# Patient Record
Sex: MALE | Race: WHITE | NOT HISPANIC OR LATINO | Employment: FULL TIME | URBAN - METROPOLITAN AREA
[De-identification: names, ages, dates, MRNs, and addresses within clinical notes are randomized per-mention and may not be internally consistent; named-entity substitution may affect disease eponyms.]

---

## 2023-03-10 LAB
ALANINE AMINOTRANSFERASE (SGPT) (U/L) IN SER/PLAS: 19 U/L (ref 10–52)
ALBUMIN (G/DL) IN SER/PLAS: 4.5 G/DL (ref 3.4–5)
ALBUMIN (MG/L) IN URINE: 29.6 MG/L
ALBUMIN/CREATININE (UG/MG) IN URINE: 31.3 UG/MG CRT (ref 0–30)
ALKALINE PHOSPHATASE (U/L) IN SER/PLAS: 37 U/L (ref 33–136)
ANION GAP IN SER/PLAS: 13 MMOL/L (ref 10–20)
ASPARTATE AMINOTRANSFERASE (SGOT) (U/L) IN SER/PLAS: 23 U/L (ref 9–39)
BILIRUBIN TOTAL (MG/DL) IN SER/PLAS: 0.5 MG/DL (ref 0–1.2)
CALCIUM (MG/DL) IN SER/PLAS: 10.6 MG/DL (ref 8.6–10.3)
CARBON DIOXIDE, TOTAL (MMOL/L) IN SER/PLAS: 23 MMOL/L (ref 21–32)
CHLORIDE (MMOL/L) IN SER/PLAS: 103 MMOL/L (ref 98–107)
CHOLESTEROL (MG/DL) IN SER/PLAS: 112 MG/DL (ref 0–199)
CHOLESTEROL IN HDL (MG/DL) IN SER/PLAS: 26.1 MG/DL
CHOLESTEROL/HDL RATIO: 4.3
CREATININE (MG/DL) IN SER/PLAS: 1.15 MG/DL (ref 0.5–1.3)
CREATININE (MG/DL) IN URINE: 94.5 MG/DL (ref 20–370)
GFR MALE: 70 ML/MIN/1.73M2
GLUCOSE (MG/DL) IN SER/PLAS: 104 MG/DL (ref 74–99)
LDL: 47 MG/DL (ref 0–99)
POTASSIUM (MMOL/L) IN SER/PLAS: 4.1 MMOL/L (ref 3.5–5.3)
PROTEIN TOTAL: 7 G/DL (ref 6.4–8.2)
SODIUM (MMOL/L) IN SER/PLAS: 135 MMOL/L (ref 136–145)
THYROTROPIN (MIU/L) IN SER/PLAS BY DETECTION LIMIT <= 0.05 MIU/L: 0.06 MIU/L (ref 0.44–3.98)
THYROXINE (T4) FREE (NG/DL) IN SER/PLAS: 1.63 NG/DL (ref 0.61–1.12)
TRIGLYCERIDE (MG/DL) IN SER/PLAS: 194 MG/DL (ref 0–149)
UREA NITROGEN (MG/DL) IN SER/PLAS: 20 MG/DL (ref 6–23)
VLDL: 39 MG/DL (ref 0–40)

## 2023-03-21 DIAGNOSIS — K21.9 GASTROESOPHAGEAL REFLUX DISEASE WITHOUT ESOPHAGITIS: Primary | ICD-10-CM

## 2023-03-21 RX ORDER — OMEPRAZOLE 40 MG/1
40 CAPSULE, DELAYED RELEASE ORAL DAILY
Qty: 90 CAPSULE | Refills: 2 | Status: SHIPPED | OUTPATIENT
Start: 2023-03-21 | End: 2023-12-07

## 2023-03-21 RX ORDER — OMEPRAZOLE 40 MG/1
1 CAPSULE, DELAYED RELEASE ORAL DAILY
COMMUNITY
Start: 2020-03-18 | End: 2023-03-21 | Stop reason: SDUPTHER

## 2023-04-24 DIAGNOSIS — I10 HTN (HYPERTENSION), BENIGN: Primary | ICD-10-CM

## 2023-04-24 DIAGNOSIS — F41.9 ANXIETY: ICD-10-CM

## 2023-04-24 RX ORDER — ATENOLOL 50 MG/1
TABLET ORAL
Qty: 90 TABLET | Refills: 1 | Status: SHIPPED | OUTPATIENT
Start: 2023-04-24 | End: 2023-12-07

## 2023-04-24 RX ORDER — FLUOXETINE 20 MG/1
20 TABLET ORAL DAILY
COMMUNITY
Start: 2022-09-15 | End: 2023-04-24 | Stop reason: SDUPTHER

## 2023-04-24 RX ORDER — FLUOXETINE 20 MG/1
20 TABLET ORAL DAILY
Qty: 90 TABLET | Refills: 1 | Status: SHIPPED | OUTPATIENT
Start: 2023-04-24 | End: 2023-10-31 | Stop reason: DRUGHIGH

## 2023-05-31 LAB — THYROTROPIN (MIU/L) IN SER/PLAS BY DETECTION LIMIT <= 0.05 MIU/L: 0.43 MIU/L (ref 0.44–3.98)

## 2023-10-31 ENCOUNTER — TELEPHONE (OUTPATIENT)
Dept: PRIMARY CARE | Facility: CLINIC | Age: 68
End: 2023-10-31

## 2023-10-31 ENCOUNTER — OFFICE VISIT (OUTPATIENT)
Dept: PRIMARY CARE | Facility: CLINIC | Age: 68
End: 2023-10-31
Payer: COMMERCIAL

## 2023-10-31 VITALS
RESPIRATION RATE: 17 BRPM | DIASTOLIC BLOOD PRESSURE: 72 MMHG | OXYGEN SATURATION: 98 % | SYSTOLIC BLOOD PRESSURE: 108 MMHG | WEIGHT: 240 LBS | BODY MASS INDEX: 36.49 KG/M2 | TEMPERATURE: 96.5 F | HEART RATE: 76 BPM

## 2023-10-31 DIAGNOSIS — I47.29 VENTRICULAR TACHYCARDIA (PAROXYSMAL) (MULTI): ICD-10-CM

## 2023-10-31 DIAGNOSIS — Z87.891 FORMER SMOKER: ICD-10-CM

## 2023-10-31 DIAGNOSIS — M54.50 CHRONIC BILATERAL LOW BACK PAIN WITHOUT SCIATICA: ICD-10-CM

## 2023-10-31 DIAGNOSIS — G89.29 CHRONIC BILATERAL LOW BACK PAIN WITHOUT SCIATICA: ICD-10-CM

## 2023-10-31 DIAGNOSIS — F41.9 ANXIETY: ICD-10-CM

## 2023-10-31 DIAGNOSIS — E11.59 TYPE 2 DIABETES MELLITUS WITH OTHER CIRCULATORY COMPLICATION, WITHOUT LONG-TERM CURRENT USE OF INSULIN (MULTI): ICD-10-CM

## 2023-10-31 DIAGNOSIS — F41.8 DEPRESSION WITH ANXIETY: Primary | ICD-10-CM

## 2023-10-31 PROBLEM — K21.9 GERD (GASTROESOPHAGEAL REFLUX DISEASE): Status: ACTIVE | Noted: 2023-10-31

## 2023-10-31 PROBLEM — I47.20 VENTRICULAR TACHYCARDIA (PAROXYSMAL): Status: ACTIVE | Noted: 2023-10-31

## 2023-10-31 PROBLEM — I25.5 ISCHEMIC CARDIOMYOPATHY: Status: ACTIVE | Noted: 2023-10-31

## 2023-10-31 PROBLEM — E11.9 DIABETES MELLITUS (MULTI): Status: ACTIVE | Noted: 2023-10-31

## 2023-10-31 PROBLEM — I25.10 CAD (CORONARY ARTERY DISEASE): Status: ACTIVE | Noted: 2023-10-31

## 2023-10-31 PROBLEM — R55 SYNCOPE: Status: RESOLVED | Noted: 2023-10-31 | Resolved: 2023-10-31

## 2023-10-31 PROBLEM — G47.33 OSA ON CPAP: Status: ACTIVE | Noted: 2023-10-31

## 2023-10-31 PROBLEM — E78.2 MIXED HYPERLIPIDEMIA: Status: ACTIVE | Noted: 2023-10-31

## 2023-10-31 PROBLEM — Z95.810 ICD (IMPLANTABLE CARDIOVERTER-DEFIBRILLATOR) IN PLACE: Status: ACTIVE | Noted: 2023-10-31

## 2023-10-31 PROBLEM — F17.200 CURRENT SMOKER: Status: RESOLVED | Noted: 2023-10-31 | Resolved: 2023-10-31

## 2023-10-31 PROBLEM — E55.9 VITAMIN D DEFICIENCY: Status: ACTIVE | Noted: 2023-10-31

## 2023-10-31 PROBLEM — E78.5 DYSLIPIDEMIA: Status: ACTIVE | Noted: 2023-10-31

## 2023-10-31 PROBLEM — E03.9 HYPOTHYROIDISM, ADULT: Status: ACTIVE | Noted: 2023-10-31

## 2023-10-31 PROBLEM — K57.32 DIVERTICULITIS, COLON: Status: RESOLVED | Noted: 2023-10-31 | Resolved: 2023-10-31

## 2023-10-31 PROBLEM — F17.200 CURRENT SMOKER: Status: ACTIVE | Noted: 2023-10-31

## 2023-10-31 PROBLEM — K76.0 FATTY LIVER: Status: ACTIVE | Noted: 2023-10-31

## 2023-10-31 PROBLEM — I10 HYPERTENSION: Status: RESOLVED | Noted: 2023-10-31 | Resolved: 2023-10-31

## 2023-10-31 PROBLEM — Z95.0 HISTORY OF CARDIAC PACEMAKER: Status: ACTIVE | Noted: 2023-10-31

## 2023-10-31 PROBLEM — N18.30 CHRONIC KIDNEY DISEASE, STAGE 3 (MULTI): Status: ACTIVE | Noted: 2023-10-31

## 2023-10-31 PROBLEM — H90.A32 MIXED CONDUCTIVE AND SENSORINEURAL HEARING LOSS OF LEFT EAR WITH RESTRICTED HEARING OF RIGHT EAR: Status: ACTIVE | Noted: 2023-10-31

## 2023-10-31 PROBLEM — Z98.61 S/P PTCA (PERCUTANEOUS TRANSLUMINAL CORONARY ANGIOPLASTY): Status: ACTIVE | Noted: 2023-10-31

## 2023-10-31 PROBLEM — N52.9 MALE ERECTILE DISORDER OF ORGANIC ORIGIN: Status: ACTIVE | Noted: 2023-10-31

## 2023-10-31 PROBLEM — N40.0 BPH (BENIGN PROSTATIC HYPERPLASIA): Status: ACTIVE | Noted: 2023-10-31

## 2023-10-31 PROCEDURE — 1159F MED LIST DOCD IN RCRD: CPT

## 2023-10-31 PROCEDURE — 3074F SYST BP LT 130 MM HG: CPT

## 2023-10-31 PROCEDURE — 1160F RVW MEDS BY RX/DR IN RCRD: CPT

## 2023-10-31 PROCEDURE — 1126F AMNT PAIN NOTED NONE PRSNT: CPT

## 2023-10-31 PROCEDURE — 3078F DIAST BP <80 MM HG: CPT

## 2023-10-31 PROCEDURE — 4010F ACE/ARB THERAPY RXD/TAKEN: CPT

## 2023-10-31 PROCEDURE — 99214 OFFICE O/P EST MOD 30 MIN: CPT

## 2023-10-31 RX ORDER — ERGOCALCIFEROL 1.25 MG/1
1 CAPSULE ORAL WEEKLY
COMMUNITY
Start: 2020-03-02 | End: 2023-12-08 | Stop reason: SDUPTHER

## 2023-10-31 RX ORDER — BACLOFEN 10 MG/1
10 TABLET ORAL 2 TIMES DAILY
Qty: 60 TABLET | Refills: 5 | Status: SHIPPED | OUTPATIENT
Start: 2023-10-31 | End: 2023-10-31 | Stop reason: SDUPTHER

## 2023-10-31 RX ORDER — BACLOFEN 10 MG/1
10 TABLET ORAL 2 TIMES DAILY
Qty: 60 TABLET | Refills: 5 | Status: SHIPPED | OUTPATIENT
Start: 2023-10-31 | End: 2023-12-15 | Stop reason: SDUPTHER

## 2023-10-31 RX ORDER — ICOSAPENT ETHYL 1 G/1
2 CAPSULE ORAL 2 TIMES DAILY
COMMUNITY
Start: 2020-03-02

## 2023-10-31 RX ORDER — LANOLIN ALCOHOL/MO/W.PET/CERES
1 CREAM (GRAM) TOPICAL DAILY
COMMUNITY

## 2023-10-31 RX ORDER — BLOOD-GLUCOSE METER
KIT MISCELLANEOUS
COMMUNITY
Start: 2020-04-09

## 2023-10-31 RX ORDER — DULAGLUTIDE 3 MG/.5ML
INJECTION, SOLUTION SUBCUTANEOUS
COMMUNITY
Start: 2020-03-02

## 2023-10-31 RX ORDER — LOSARTAN POTASSIUM 50 MG/1
1 TABLET ORAL 2 TIMES DAILY
COMMUNITY
Start: 2018-07-17 | End: 2024-05-01 | Stop reason: SDUPTHER

## 2023-10-31 RX ORDER — MELOXICAM 15 MG/1
1 TABLET ORAL DAILY PRN
COMMUNITY
Start: 2022-10-12 | End: 2023-12-15 | Stop reason: SDUPTHER

## 2023-10-31 RX ORDER — CLOPIDOGREL BISULFATE 75 MG/1
1 TABLET ORAL DAILY
COMMUNITY
Start: 2020-09-11 | End: 2024-05-21 | Stop reason: SDUPTHER

## 2023-10-31 RX ORDER — HYDROCORTISONE AND ACETIC ACID 20.75; 10.375 MG/ML; MG/ML
SOLUTION AURICULAR (OTIC)
COMMUNITY
Start: 2021-05-18 | End: 2023-11-15 | Stop reason: ALTCHOICE

## 2023-10-31 RX ORDER — LEVOTHYROXINE SODIUM 175 UG/1
175 TABLET ORAL DAILY
COMMUNITY
Start: 2020-03-02 | End: 2023-12-07

## 2023-10-31 RX ORDER — TADALAFIL 5 MG/1
1 TABLET ORAL DAILY
COMMUNITY
Start: 2022-04-27 | End: 2024-05-29 | Stop reason: SDUPTHER

## 2023-10-31 RX ORDER — ASCORBIC ACID 500 MG
TABLET,CHEWABLE ORAL
COMMUNITY
Start: 2021-09-29 | End: 2024-01-22 | Stop reason: ALTCHOICE

## 2023-10-31 RX ORDER — ROSUVASTATIN CALCIUM 40 MG/1
1 TABLET, COATED ORAL DAILY
COMMUNITY
Start: 2021-10-20 | End: 2023-12-07

## 2023-10-31 RX ORDER — EMPAGLIFLOZIN AND METFORMIN HYDROCHLORIDE 12.5; 1 MG/1; MG/1
2 TABLET ORAL DAILY
COMMUNITY
Start: 2020-03-02 | End: 2023-12-07

## 2023-10-31 RX ORDER — PIOGLITAZONEHYDROCHLORIDE 45 MG/1
1 TABLET ORAL DAILY
COMMUNITY
Start: 2020-04-17 | End: 2023-12-07

## 2023-10-31 RX ORDER — ASPIRIN 81 MG/1
1 TABLET ORAL DAILY
COMMUNITY

## 2023-10-31 RX ORDER — FENOFIBRATE 145 MG/1
1 TABLET, FILM COATED ORAL DAILY
COMMUNITY
Start: 2014-09-12 | End: 2023-12-08

## 2023-10-31 RX ORDER — FLUTICASONE PROPIONATE 50 MCG
1 SPRAY, SUSPENSION (ML) NASAL DAILY
COMMUNITY
Start: 2020-03-02

## 2023-10-31 RX ORDER — FLUOXETINE 20 MG/1
20 TABLET ORAL DAILY
Qty: 90 TABLET | Refills: 1 | Status: SHIPPED | OUTPATIENT
Start: 2023-10-31

## 2023-10-31 RX ORDER — NITROGLYCERIN 0.4 MG/1
0.4 TABLET SUBLINGUAL EVERY 5 MIN PRN
COMMUNITY

## 2023-10-31 ASSESSMENT — PATIENT HEALTH QUESTIONNAIRE - PHQ9
SUM OF ALL RESPONSES TO PHQ9 QUESTIONS 1 AND 2: 4
3. TROUBLE FALLING OR STAYING ASLEEP OR SLEEPING TOO MUCH: NOT AT ALL
6. FEELING BAD ABOUT YOURSELF - OR THAT YOU ARE A FAILURE OR HAVE LET YOURSELF OR YOUR FAMILY DOWN: NOT AT ALL
SUM OF ALL RESPONSES TO PHQ QUESTIONS 1-9: 11
5. POOR APPETITE OR OVEREATING: NEARLY EVERY DAY
1. LITTLE INTEREST OR PLEASURE IN DOING THINGS: NEARLY EVERY DAY
9. THOUGHTS THAT YOU WOULD BE BETTER OFF DEAD, OR OF HURTING YOURSELF: NOT AT ALL
7. TROUBLE CONCENTRATING ON THINGS, SUCH AS READING THE NEWSPAPER OR WATCHING TELEVISION: SEVERAL DAYS
2. FEELING DOWN, DEPRESSED OR HOPELESS: SEVERAL DAYS
8. MOVING OR SPEAKING SO SLOWLY THAT OTHER PEOPLE COULD HAVE NOTICED. OR THE OPPOSITE, BEING SO FIGETY OR RESTLESS THAT YOU HAVE BEEN MOVING AROUND A LOT MORE THAN USUAL: NOT AT ALL
4. FEELING TIRED OR HAVING LITTLE ENERGY: NEARLY EVERY DAY

## 2023-10-31 NOTE — PATIENT INSTRUCTIONS
It was nice seeing you in the office today.  Sign up for MyChart to get results instantly.  Obtain labs/imaging as discussed during our visit.   Follow up 2-3 weeks for OMT.   Call the office: [phone] for questions or concerns.  Please allow 48-72 hours for medication refills.    Alek Clemens D.O.   Family Medicine PGY-1, [facility]  10/31/23

## 2023-10-31 NOTE — PROGRESS NOTES
Subjective   Silvio Noel is a 68 y.o. male who presents for Back Pain.  Back began to hurt 8/2/2022. No associated injury. Saw Dr. Jernigan initially, recommended PT but opted to not do so.     The pain radiates from the lumbar spine to the knee on the L side but not the R side. Has been taking meloxicam and tramadol with some relief. Feels much weaker now as well, lays on the couch all day which began before the back pain and has gotten progressively worse.     PHQ 9 score 11, has been on fluoxetine for many years. Previously has taken 40mg, currently on 20mg.     Quit smoking 8/2023.                 Objective     /72 (BP Location: Left arm, Patient Position: Sitting, BP Cuff Size: Large adult)   Pulse 76   Temp 35.8 °C (96.5 °F)   Resp 17   Wt 109 kg (240 lb)   SpO2 98%   BMI 36.49 kg/m²   Physical Exam  Vitals reviewed.   Constitutional:       Appearance: Normal appearance.   Eyes:      Extraocular Movements: Extraocular movements intact.   Cardiovascular:      Rate and Rhythm: Normal rate and regular rhythm.      Pulses: Normal pulses.      Heart sounds: Normal heart sounds.   Pulmonary:      Effort: Pulmonary effort is normal.      Breath sounds: Normal breath sounds.   Musculoskeletal:         General: No tenderness or deformity.      Right lower leg: No edema.      Left lower leg: No edema.      Comments: B/l UE strength 5/5, no tenderness. B/l LE strength 5/5 grossly, no muscle tenderness. Straight leg raise test on the L is positive.    Skin:     General: Skin is warm and dry.   Neurological:      General: No focal deficit present.      Mental Status: He is alert and oriented to person, place, and time.   Psychiatric:         Mood and Affect: Mood normal.         Behavior: Behavior normal.         Assessment/Plan   Problem List Items Addressed This Visit       Depression with anxiety - Primary     PHQ-9 10/31/23 is 11. Increased prozac to 40mg. Patient has tolerated 40mg in the past.           Low back pain     XR lumbar 8/2022 showing mild degenerative changes.     Referral to PT 10/31/2023.     Offered OMM in 2-3 weeks.          Relevant Medications    baclofen (Lioresal) 10 mg tablet    Other Relevant Orders    Referral to Physical Therapy    Former smoker     Other Visit Diagnoses       Anxiety        Relevant Medications    FLUoxetine (PROzac) 20 mg tablet                   Alek Clemens D.O.   Family Medicine PGY-1, [facility]  10/31/23

## 2023-10-31 NOTE — ASSESSMENT & PLAN NOTE
XR lumbar 8/2022 showing mild degenerative changes.     Referral to PT 10/31/2023.     Offered OMM in 2-3 weeks.

## 2023-11-01 NOTE — PROGRESS NOTES
I saw and evaluated the patient. I personally obtained the key and critical portions of the history and physical exam or was physically present for key and critical portions performed by the resident/fellow. I reviewed the resident/fellow's documentation and discussed the patient with the resident/fellow. I agree with the resident/fellow's medical decision making as documented in the note.    Jose Jernigan, DO

## 2023-11-06 ENCOUNTER — APPOINTMENT (OUTPATIENT)
Dept: CARDIOLOGY | Facility: CLINIC | Age: 68
End: 2023-11-06
Payer: COMMERCIAL

## 2023-11-15 ENCOUNTER — OFFICE VISIT (OUTPATIENT)
Dept: ENDOCRINOLOGY | Facility: CLINIC | Age: 68
End: 2023-11-15
Payer: COMMERCIAL

## 2023-11-15 VITALS
HEIGHT: 68 IN | SYSTOLIC BLOOD PRESSURE: 132 MMHG | BODY MASS INDEX: 37.28 KG/M2 | DIASTOLIC BLOOD PRESSURE: 76 MMHG | WEIGHT: 246 LBS

## 2023-11-15 DIAGNOSIS — E78.5 DYSLIPIDEMIA: ICD-10-CM

## 2023-11-15 DIAGNOSIS — E03.9 HYPOTHYROIDISM, ADULT: ICD-10-CM

## 2023-11-15 DIAGNOSIS — E11.59 TYPE 2 DIABETES MELLITUS WITH OTHER CIRCULATORY COMPLICATION, WITHOUT LONG-TERM CURRENT USE OF INSULIN (MULTI): Primary | ICD-10-CM

## 2023-11-15 LAB
POC FINGERSTICK BLOOD GLUCOSE: 171 MG/DL (ref 70–100)
POC HEMOGLOBIN A1C: 7.5 % (ref 4.2–6.5)

## 2023-11-15 PROCEDURE — 4010F ACE/ARB THERAPY RXD/TAKEN: CPT | Performed by: HOSPITALIST

## 2023-11-15 PROCEDURE — 82962 GLUCOSE BLOOD TEST: CPT | Performed by: HOSPITALIST

## 2023-11-15 PROCEDURE — 1159F MED LIST DOCD IN RCRD: CPT | Performed by: HOSPITALIST

## 2023-11-15 PROCEDURE — 3078F DIAST BP <80 MM HG: CPT | Performed by: HOSPITALIST

## 2023-11-15 PROCEDURE — 1126F AMNT PAIN NOTED NONE PRSNT: CPT | Performed by: HOSPITALIST

## 2023-11-15 PROCEDURE — 99214 OFFICE O/P EST MOD 30 MIN: CPT | Performed by: HOSPITALIST

## 2023-11-15 PROCEDURE — 3075F SYST BP GE 130 - 139MM HG: CPT | Performed by: HOSPITALIST

## 2023-11-15 PROCEDURE — 83036 HEMOGLOBIN GLYCOSYLATED A1C: CPT | Performed by: HOSPITALIST

## 2023-11-15 PROCEDURE — 1160F RVW MEDS BY RX/DR IN RCRD: CPT | Performed by: HOSPITALIST

## 2023-11-15 RX ORDER — INSULIN GLARGINE 100 [IU]/ML
28 INJECTION, SOLUTION SUBCUTANEOUS NIGHTLY
COMMUNITY
End: 2023-12-15 | Stop reason: SDUPTHER

## 2023-11-15 NOTE — PROGRESS NOTES
"Subjective   Patient ID: Silvio Noel is a 68 y.o. male who presents for Diabetes (Dx DM: 2013/PCP: [facility]/Podiatry: does not see one /Eye exam: yearly, overdue per patient /Patient testing glucose once daily. Did not bring meter./Last hga1c 6/1/23 result 6.6%) and Hypothyroidism (175- 1 tab mon- fri 1/2 tab sat and none on sun /Takes correctly most of the time ).  Lab Results   Component Value Date    HGBA1C 7.5 (A) 11/15/2023      HPI    Review of Systems    Objective   Visit Vitals  /76   Ht 1.727 m (5' 8\")   Wt 112 kg (246 lb)   BMI 37.40 kg/m²   Smoking Status Former   BSA 2.32 m²      Physical Exam    Assessment/Plan   Diagnoses and all orders for this visit:  Type 2 diabetes mellitus with other circulatory complication, without long-term current use of insulin (CMS/[facility])  -     POCT glucose manually resulted  -     POCT glycosylated hemoglobin (Hb A1C) manually resulted  Hypothyroidism, adult  Dyslipidemia      Pt is 68 year old man with type II DM since 2013.    Current regimen :   Synjardy 12.5/1000mg 2 tabs daily  Trulicity 3 mg weekly  pioglitazone 45mg daily,    Restarted taking basaglar 0-0-0-28 ( started ~ 2 weeks ago )     he is complaint with trulicity , no SE    he stopped BASAGLAR after taking trulicity as prescribed. But restarted it 2-3 weeks ago  Checking BG once morning -119,120s ,    Did not take basaglar last night and morning Bg was 135   denies any hypoglycemia symptoms   h/o increased urination and hesitancy       stopped regular soda in past. RESTARTED IT, drinks 1 can every other day. Gained ~ 16 lbs    HgA1C worse and above goal   - insurance changing to medicare soon, he is unsure about coverage of his meds.     CONTINUE SYNJARDY/ TRULICITY / PIOGLITAZONE   CHANGE BASAGLAR TO 20 UNITS AT NIGHT    Stop POP      IF SYNJARDY AND TRULICITY NOT COVERED THEN WE CHANGE TO METFORMIN 1000MG TWICE A DAY WITH GOOD AND GLIPIZIDE 10 MG TWICE A DAY BEFORE EATING. NO GLPIZIDE IF NOT " EATING , IT CAN CAUSE LOW BLOOD SUGAR OTHERWISE.   - no change in regimen  today  - in past discussed that alcohol metformin and trulicity combination risks of pancreatitis as well. he is aware of SE  - advised to check BG 3 times a day and bring glucometer next visit   - if plan for GC injection advised to call me   -BP at goal, on ARB  -creatinine normal   on statin. LDL at goal  -eye exam due   -negative microalbumin  -Pt instructed to call office with any hypoglycemia, hyperglycemia, or any other concerns.    Vitamin D deficiency:   Pt is currently taking vitamin D 50,000 IU weekly.    Hypothyroidism:TSH was 0.43 in 5/ 2023   clinically euthyroid   he has a lot of 175 at home , take 175- 1 tab mon- fri 1/2 tab sat and none on sun    once he is done with 175 mcg tabs , will change to 137 mcg 1 tab daily   labs before NV     RTC 6m     SH- works with [email or web address], works from home , has 3 kid and 3 step kids, 15 GKs ,  TWIN GK CAME IN  AUG 2023 .

## 2023-11-15 NOTE — PATIENT INSTRUCTIONS
CONTINUE SYNJARDY/ TRULICITY / PIOGLITAZONE   CHANGE BASAGLAR TO 20 UNITS AT NIGHT    Stop POP      IF SYNJARDY AND TRULICITY NOT COVERED THEN WE CHANGE TO METFORMIN 1000MG TWICE A DAY WITH GOOD AND GLIPIZIDE 10 MG TWICE A DAY BEFORE EATING. NO GLPIZIDE IF NOT EATING , IT CAN CAUSE LOW BLOOD SUGAR OTHERWISE.

## 2023-11-20 ENCOUNTER — OFFICE VISIT (OUTPATIENT)
Dept: CARDIOLOGY | Facility: CLINIC | Age: 68
End: 2023-11-20
Payer: COMMERCIAL

## 2023-11-20 VITALS
HEIGHT: 68 IN | BODY MASS INDEX: 37.28 KG/M2 | HEART RATE: 63 BPM | WEIGHT: 246 LBS | DIASTOLIC BLOOD PRESSURE: 86 MMHG | SYSTOLIC BLOOD PRESSURE: 142 MMHG

## 2023-11-20 DIAGNOSIS — I25.5 ISCHEMIC CARDIOMYOPATHY: ICD-10-CM

## 2023-11-20 DIAGNOSIS — E78.5 DYSLIPIDEMIA: ICD-10-CM

## 2023-11-20 DIAGNOSIS — E78.2 MIXED HYPERLIPIDEMIA: ICD-10-CM

## 2023-11-20 DIAGNOSIS — Z98.61 S/P PTCA (PERCUTANEOUS TRANSLUMINAL CORONARY ANGIOPLASTY): ICD-10-CM

## 2023-11-20 DIAGNOSIS — I25.10 CORONARY ARTERY DISEASE INVOLVING NATIVE HEART, UNSPECIFIED VESSEL OR LESION TYPE, UNSPECIFIED WHETHER ANGINA PRESENT: ICD-10-CM

## 2023-11-20 PROCEDURE — 99213 OFFICE O/P EST LOW 20 MIN: CPT | Performed by: INTERNAL MEDICINE

## 2023-11-20 PROCEDURE — 1126F AMNT PAIN NOTED NONE PRSNT: CPT | Performed by: INTERNAL MEDICINE

## 2023-11-20 PROCEDURE — 3077F SYST BP >= 140 MM HG: CPT | Performed by: INTERNAL MEDICINE

## 2023-11-20 PROCEDURE — 4010F ACE/ARB THERAPY RXD/TAKEN: CPT | Performed by: INTERNAL MEDICINE

## 2023-11-20 PROCEDURE — 1159F MED LIST DOCD IN RCRD: CPT | Performed by: INTERNAL MEDICINE

## 2023-11-20 PROCEDURE — 1160F RVW MEDS BY RX/DR IN RCRD: CPT | Performed by: INTERNAL MEDICINE

## 2023-11-20 PROCEDURE — 3079F DIAST BP 80-89 MM HG: CPT | Performed by: INTERNAL MEDICINE

## 2023-11-20 ASSESSMENT — ENCOUNTER SYMPTOMS
RESPIRATORY NEGATIVE: 1
CONSTITUTIONAL NEGATIVE: 1
CARDIOVASCULAR NEGATIVE: 1
NEUROLOGICAL NEGATIVE: 1

## 2023-11-20 NOTE — PROGRESS NOTES
Referred by Dr. Guzman ref. provider found provider found for   Chief Complaint   Patient presents with    Follow-up     6 month follow up.         History of Present Illness  Silvio Noel is a 68 y.o. year old male patient with history of hypertension.  Doing well from a cardiac standpoint no complaint no symptoms of chest pain or shortness of breath denies any symptoms of palpitations syncope or presyncope.  I discussed with the patient at length we will continue medication will call for any problems and follow-up as scheduled    Past Medical History  Past Medical History:   Diagnosis Date    Atherosclerotic heart disease of native coronary artery without angina pectoris 2019    Coronary artery disease without angina pectoris, unspecified vessel or lesion type, unspecified whether native or transplanted heart    Diverticulitis, colon 10/31/2023       Social History  Social History     Tobacco Use    Smoking status: Former     Types: Cigarettes     Quit date: 2023     Years since quittin.3    Smokeless tobacco: Not on file   Vaping Use    Vaping Use: Never used   Substance Use Topics    Alcohol use: Not on file    Drug use: Not on file       Family History   No family history on file.    Review of Systems  As per HPI, all other systems reviewed and negative.    Allergies:  Allergies   Allergen Reactions    Codeine Unknown     Blood pressure goes down    Hydrocodone-Acetaminophen Unknown     Blood pressure goes up    Lisinopril Cough    Losartan Unknown    Simvastatin Unknown        Outpatient Medications:  Current Outpatient Medications   Medication Instructions    ascorbic acid (Strawberry C) 500 mg chewable tablet oral    aspirin 81 mg EC tablet 1 tablet, oral, Daily    atenolol (Tenormin) 50 mg tablet TAKE 1/2 TABLET TWICE A DAY    baclofen (LIORESAL) 10 mg, oral, 2 times daily    clopidogrel (Plavix) 75 mg tablet 1 tablet, oral, Daily    ergocalciferol (Vitamin D-2) 1.25 MG (09167 UT) capsule 1  capsule, oral, Weekly    fenofibrate (Tricor) 145 mg tablet 1 tablet, oral, Daily    FLUoxetine (PROZAC) 20 mg, oral, Daily    fluticasone (Flonase) 50 mcg/actuation nasal spray 1 spray, nasal, Daily    FreeStyle Lite Strips strip USE TWICE DAILY    icosapent ethyL (Vascepa) 1 gram capsule 2 capsules, oral, 2 times daily    insulin glargine (BASAGLAR KWIKPEN U-100 INSULIN) 28 Units, subcutaneous, Nightly, Take as directed per insulin instructions.    levothyroxine (SYNTHROID, LEVOXYL) 175 mcg, oral, Daily, HALF tablet Saturday and NO tablet on Sunday     losartan (Cozaar) 50 mg tablet 1 tablet, oral, 2 times daily    magnesium oxide (Mag-Ox) 400 mg (241.3 mg magnesium) tablet 1 tablet, oral, Daily    meloxicam (Mobic) 15 mg tablet 1 tablet, oral, Daily PRN    nitroglycerin (NITROSTAT) 0.4 mg, sublingual, Every 5 min PRN    omeprazole (PRILOSEC) 40 mg, oral, Daily    pioglitazone (Actos) 45 mg tablet 1 tablet, oral, Daily    rosuvastatin (Crestor) 40 mg tablet 1 tablet, oral, Daily    Synjardy 12.5-1,000 mg 2 tablets, oral, Daily    tadalafil (Cialis) 5 mg tablet 1 tablet, oral, Daily    Trulicity 3 mg/0.5 mL pen injector subcutaneous         Vitals:  Vitals:    11/20/23 1530   BP: (!) 149/95   Pulse: 63       Physical Exam:  Physical Exam  Constitutional:       Appearance: He is obese.   Neck:      Vascular: No carotid bruit.   Cardiovascular:      Rate and Rhythm: Normal rate and regular rhythm.      Pulses: Normal pulses.      Heart sounds: No murmur heard.  Pulmonary:      Effort: Pulmonary effort is normal.      Breath sounds: Normal breath sounds.   Skin:     General: Skin is warm and dry.   Neurological:      General: No focal deficit present.      Mental Status: He is alert and oriented to person, place, and time.         Review of Systems   Constitutional: Negative.    Respiratory: Negative.     Cardiovascular: Negative.    Neurological: Negative.         Assessment/Plan   Problem List Items Addressed This  Visit             ICD-10-CM    CAD (coronary artery disease) I25.10    Dyslipidemia E78.5    Ischemic cardiomyopathy I25.5    Mixed hyperlipidemia E78.2    S/P PTCA (percutaneous transluminal coronary angioplasty) Z98.61       Scribe Attestation  By signing my name below, I Annelise WILFRED Jacques LPN  , Scribe   attest that this documentation has been prepared under the direction and in the presence of Savita Newman MD.     Savita Newman MD [facility]  Interventional Cardiology   of [facility]     Thank you for allowing me to participate in the care of this patient. Please do not hesitate to contact me with any further questions or concerns.

## 2023-11-21 ENCOUNTER — OFFICE VISIT (OUTPATIENT)
Dept: CARDIOLOGY | Facility: CLINIC | Age: 68
End: 2023-11-21
Payer: COMMERCIAL

## 2023-11-21 ENCOUNTER — ANCILLARY PROCEDURE (OUTPATIENT)
Dept: CARDIOLOGY | Facility: CLINIC | Age: 68
End: 2023-11-21
Payer: COMMERCIAL

## 2023-11-21 VITALS
BODY MASS INDEX: 37.13 KG/M2 | DIASTOLIC BLOOD PRESSURE: 78 MMHG | WEIGHT: 245 LBS | TEMPERATURE: 97.5 F | HEIGHT: 68 IN | HEART RATE: 82 BPM | SYSTOLIC BLOOD PRESSURE: 126 MMHG

## 2023-11-21 DIAGNOSIS — I47.29 PAROXYSMAL VENTRICULAR TACHYCARDIA (MULTI): ICD-10-CM

## 2023-11-21 DIAGNOSIS — Z95.810 PRESENCE OF AUTOMATIC CARDIOVERTER/DEFIBRILLATOR (AICD): ICD-10-CM

## 2023-11-21 DIAGNOSIS — Z95.810 ICD (IMPLANTABLE CARDIOVERTER-DEFIBRILLATOR) IN PLACE: Primary | ICD-10-CM

## 2023-11-21 PROCEDURE — 3078F DIAST BP <80 MM HG: CPT | Performed by: INTERNAL MEDICINE

## 2023-11-21 PROCEDURE — 99213 OFFICE O/P EST LOW 20 MIN: CPT | Performed by: INTERNAL MEDICINE

## 2023-11-21 PROCEDURE — 4010F ACE/ARB THERAPY RXD/TAKEN: CPT | Performed by: INTERNAL MEDICINE

## 2023-11-21 PROCEDURE — 93283 PRGRMG EVAL IMPLANTABLE DFB: CPT | Performed by: INTERNAL MEDICINE

## 2023-11-21 PROCEDURE — 1126F AMNT PAIN NOTED NONE PRSNT: CPT | Performed by: INTERNAL MEDICINE

## 2023-11-21 PROCEDURE — 3074F SYST BP LT 130 MM HG: CPT | Performed by: INTERNAL MEDICINE

## 2023-11-21 PROCEDURE — 93290 INTERROG DEV EVAL ICPMS IP: CPT | Performed by: INTERNAL MEDICINE

## 2023-11-21 PROCEDURE — 1159F MED LIST DOCD IN RCRD: CPT | Performed by: INTERNAL MEDICINE

## 2023-11-21 PROCEDURE — 1160F RVW MEDS BY RX/DR IN RCRD: CPT | Performed by: INTERNAL MEDICINE

## 2023-11-21 ASSESSMENT — PATIENT HEALTH QUESTIONNAIRE - PHQ9
2. FEELING DOWN, DEPRESSED OR HOPELESS: NOT AT ALL
SUM OF ALL RESPONSES TO PHQ9 QUESTIONS 1 AND 2: 0
1. LITTLE INTEREST OR PLEASURE IN DOING THINGS: NOT AT ALL

## 2023-11-21 NOTE — PROGRESS NOTES
Subjective:  The patient is a 68-year-old white male, followed primarily by Dr. Joes Jernigan and from a cardiology standpoint by Dr. Savita Newman, who presents again for ICD follow-up.  He suffered an inferoposterior myocardial infarction at age 45 with a cardiac arrest from which she was resuscitated.  He has had multiple percutaneous interventions but has never required CABG.  His LVEF has ranged between 40% and 55%.    The patient underwent EP testing by Dr. Eron Ardon in September 2004 and had inducible ventricular tachycardia.  A [employer] DDDR ICD was placed at that time.  The patient had some early shocks due to atrial tachycardia.  His generator was replaced in May 2011 by Dr. Nahomy Wade.  He was lost to follow-up for several years but reestablished with us in 2019.  He is still not yet at the elective replacement indicator of this device.    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  He enjoys playing golf, and played 3 times a week this year, but typically shoots 45-55 for 9 holes.  His best 9 of the year was a 43.    Current Outpatient Medications   Medication Sig    ascorbic acid (Strawberry C) 500 mg chewable tablet Chew.    aspirin 81 mg EC tablet Take 1 tablet (81 mg) by mouth once daily.    atenolol (Tenormin) 50 mg tablet TAKE 1 TABLET TWICE A DAY.    baclofen (Lioresal) 10 mg tablet Take 1 tablet (10 mg) by mouth 2 times a day.    clopidogrel (Plavix) 75 mg tablet Take 1 tablet (75 mg) by mouth once daily.    ergocalciferol (Vitamin D-2) 1.25 MG (21599 UT) capsule Take 1 capsule (1,250 mcg) by mouth once a week.    fenofibrate (Tricor) 145 mg tablet Take 1 tablet (145 mg) by mouth once daily.    FLUoxetine (PROzac) 20 mg tablet Take 1 tablet (20 mg) by mouth once daily.    fluticasone (Flonase) 50 mcg/actuation nasal spray Administer 1 spray into affected nostril(s) once daily.    FreeStyle Lite Strips strip USE TWICE DAILY     icosapent ethyL (Vascepa) 1 gram capsule Take 2 capsules (2 g) by mouth 2 times a day.    insulin glargine (Basaglar KwikPen U-100 Insulin) 100 unit/mL (3 mL) pen Inject 28 Units under the skin once daily at bedtime. Take as directed per insulin instructions.    levothyroxine (Synthroid, Levoxyl) 175 mcg tablet Take 1 tablet (175 mcg) by mouth once daily. HALF tablet Saturday and NO tablet on Sunday    losartan (Cozaar) 50 mg tablet Take 1 tablet (50 mg) by mouth 2 times a day.    magnesium oxide (Mag-Ox) 400 mg (241.3 mg magnesium) tablet Take 1 tablet (400 mg) by mouth once daily.    meloxicam (Mobic) 15 mg tablet Take 1 tablet (15 mg) by mouth once daily as needed.    nitroglycerin (Nitrostat) 0.4 mg SL tablet Place 1 tablet (0.4 mg) under the tongue every 5 minutes if needed.    omeprazole (PriLOSEC) 40 mg DR capsule Take 1 capsule (40 mg) by mouth once daily.    pioglitazone (Actos) 45 mg tablet Take 1 tablet (45 mg) by mouth once daily.    rosuvastatin (Crestor) 40 mg tablet Take 1 tablet (40 mg) by mouth once daily.    Synjardy 12.5-1,000 mg Take 2 tablets by mouth once daily.    tadalafil (Cialis) 5 mg tablet Take 1 tablet (5 mg) by mouth once daily.    Trulicity 3 mg/0.5 mL pen injector Inject under the skin.     Allergies:  Codeine, Hydrocodone-acetaminophen, Lisinopril, Losartan, and Simvastatin     Patient Active Problem List   Diagnosis    BPH (benign prostatic hyperplasia)    CAD (coronary artery disease)    Chronic kidney disease, stage 3 (CMS/HCC)    Depression with anxiety    Type 2 diabetes mellitus with other circulatory complication, without long-term current use of insulin (CMS/HCC)    Dyslipidemia    Fatty liver    GERD (gastroesophageal reflux disease)    History of cardiac pacemaker    Hypothyroidism, adult    ICD (implantable cardioverter-defibrillator) in place    Ischemic cardiomyopathy    Low back pain    Male erectile disorder of organic origin    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    Mixed hyperlipidemia    MARY on CPAP    S/P PTCA (percutaneous transluminal coronary angioplasty)    Vitamin D deficiency    Ventricular tachycardia (paroxysmal) (CMS/HCC)    Former smoker     Past Surgical History:   Procedure Laterality Date    OTHER SURGICAL HISTORY  06/30/2015    Cardioverter-defibrillator Pulse Generator Insertion    OTHER SURGICAL HISTORY  09/23/2021    Colonoscopy    OTHER SURGICAL HISTORY  09/23/2021    Percutaneous transluminal coronary angioplasty    OTHER SURGICAL HISTORY  09/23/2021    Cardiac catheterization    TONSILLECTOMY  06/30/2015    Tonsillectomy     Objective:  Vitals:    11/21/23 1536   BP: 126/78   Pulse: 82   Temp: 36.4 °C (97.5 °F)   Height:     1.727 m (5' 8\")  Weight: 111 kg (245 lb)     Exam:  Gen: Pleasant bearded gentleman in no distress.  HEENT: No scleral icterus.  Neck: No jugular venous distention or thyromegaly.  Left subclavian ICD pocket: Normal in appearance.  Lungs: Clear to auscultation, with no wheezes or rales.  Heart: Regular rhythm with grade 1/6 functional flow murmur along left sternal border with normal S2.  Abdomen: Benign, with no organomegaly or masses.  Extremities: Intact distal pulses; no edema.  Neuro: No focal neurologic abnormalities.  Skin: No cutaneous lesions.    Device Check:  A [employer] ICD check was done.  The unit is programmed for backup DDI pacing at 60 bpm, provide only 1% atrial pacing and 0% ventricular pacing.  The lead parameters were excellent.  The device battery longevity is still estimated at 9 more months.    Impressions:  1.  Coronary artery disease, status post inferoposterior myocardial infarction 23 years ago.  The patient is known to have an occluded circumflex, and underwent RCA stenting in August 2020.  He is followed by Dr. Newman.  2.  Mild ischemic cardiomyopathy (LVEF 40-55%), without heart failure symptoms.  3.  Status postcardiac arrest 23 " years ago, and inducible VT by EP testing in 2004.  4.  Status post [employer] DDDR ICD systems in September 2004 and May 2011.  Surprisingly, the current device may last until 2024.  5.  Other medical problems, including hypertension, type 2 diabetes, hyperlipidemia, hypothyroidism, chronic obesity, and tobacco use, followed by Dr. Jernigan.    Recommendations:  1.  The patient will follow-up with me in 6 to 8 months for another ICD check.  2.  It is anticipated that he will require ICD generator replacement in 2024.  3.  With any sustained supraventricular or ventricular arrhythmias, the patient's atenolol may be switched to empiric sotalol.      Stanislaw Sanchez MD

## 2023-12-07 DIAGNOSIS — E03.9 HYPOTHYROIDISM, ADULT: ICD-10-CM

## 2023-12-07 DIAGNOSIS — K21.9 GASTROESOPHAGEAL REFLUX DISEASE WITHOUT ESOPHAGITIS: ICD-10-CM

## 2023-12-07 DIAGNOSIS — E78.41 ELEVATED LIPOPROTEIN(A): Primary | ICD-10-CM

## 2023-12-07 DIAGNOSIS — I10 HTN (HYPERTENSION), BENIGN: ICD-10-CM

## 2023-12-07 DIAGNOSIS — E11.59 TYPE 2 DIABETES MELLITUS WITH OTHER CIRCULATORY COMPLICATION, WITHOUT LONG-TERM CURRENT USE OF INSULIN (MULTI): ICD-10-CM

## 2023-12-07 RX ORDER — LEVOTHYROXINE SODIUM 175 UG/1
175 TABLET ORAL DAILY
Qty: 90 TABLET | Refills: 2 | Status: SHIPPED | OUTPATIENT
Start: 2023-12-07

## 2023-12-07 RX ORDER — PIOGLITAZONEHYDROCHLORIDE 45 MG/1
45 TABLET ORAL DAILY
Qty: 90 TABLET | Refills: 2 | Status: SHIPPED | OUTPATIENT
Start: 2023-12-07

## 2023-12-07 RX ORDER — OMEPRAZOLE 40 MG/1
40 CAPSULE, DELAYED RELEASE ORAL DAILY
Qty: 90 CAPSULE | Refills: 2 | Status: SHIPPED | OUTPATIENT
Start: 2023-12-07

## 2023-12-07 RX ORDER — EMPAGLIFLOZIN AND METFORMIN HYDROCHLORIDE 12.5; 1 MG/1; MG/1
2 TABLET ORAL DAILY
Qty: 180 TABLET | Refills: 2 | Status: SHIPPED | OUTPATIENT
Start: 2023-12-07

## 2023-12-07 RX ORDER — ROSUVASTATIN CALCIUM 40 MG/1
40 TABLET, COATED ORAL DAILY
Qty: 90 TABLET | Refills: 2 | Status: SHIPPED | OUTPATIENT
Start: 2023-12-07

## 2023-12-07 RX ORDER — ATENOLOL 50 MG/1
TABLET ORAL
Qty: 90 TABLET | Refills: 1 | Status: SHIPPED | OUTPATIENT
Start: 2023-12-07 | End: 2024-01-22 | Stop reason: SDUPTHER

## 2023-12-08 DIAGNOSIS — E55.9 VITAMIN D DEFICIENCY: ICD-10-CM

## 2023-12-08 RX ORDER — ERGOCALCIFEROL 1.25 MG/1
1 CAPSULE ORAL
Qty: 12 CAPSULE | Refills: 2 | Status: SHIPPED | OUTPATIENT
Start: 2023-12-08 | End: 2024-05-21 | Stop reason: WASHOUT

## 2023-12-08 RX ORDER — FENOFIBRATE 145 MG/1
145 TABLET, FILM COATED ORAL DAILY
Qty: 90 TABLET | Refills: 3 | Status: SHIPPED | OUTPATIENT
Start: 2023-12-08

## 2023-12-15 ENCOUNTER — TELEPHONE (OUTPATIENT)
Dept: PRIMARY CARE | Facility: CLINIC | Age: 68
End: 2023-12-15
Payer: COMMERCIAL

## 2023-12-15 ENCOUNTER — TELEPHONE (OUTPATIENT)
Dept: ENDOCRINOLOGY | Facility: CLINIC | Age: 68
End: 2023-12-15
Payer: COMMERCIAL

## 2023-12-15 DIAGNOSIS — G89.29 CHRONIC BILATERAL LOW BACK PAIN WITHOUT SCIATICA: ICD-10-CM

## 2023-12-15 DIAGNOSIS — M54.50 CHRONIC BILATERAL LOW BACK PAIN WITHOUT SCIATICA: ICD-10-CM

## 2023-12-15 DIAGNOSIS — E11.59 TYPE 2 DIABETES MELLITUS WITH OTHER CIRCULATORY COMPLICATION, WITHOUT LONG-TERM CURRENT USE OF INSULIN (MULTI): ICD-10-CM

## 2023-12-15 RX ORDER — BACLOFEN 10 MG/1
10 TABLET ORAL 2 TIMES DAILY
Qty: 60 TABLET | Refills: 2 | Status: SHIPPED | OUTPATIENT
Start: 2023-12-15 | End: 2023-12-18 | Stop reason: SDUPTHER

## 2023-12-15 RX ORDER — MELOXICAM 15 MG/1
15 TABLET ORAL DAILY PRN
Qty: 30 TABLET | Refills: 0 | Status: SHIPPED | OUTPATIENT
Start: 2023-12-15 | End: 2023-12-18 | Stop reason: SDUPTHER

## 2023-12-15 NOTE — TELEPHONE ENCOUNTER
Silvio left a VM with these exact words.  He needs a refill on his stick  Basaglar.  Did not specify exactly what or where.

## 2023-12-18 ENCOUNTER — TELEPHONE (OUTPATIENT)
Dept: PRIMARY CARE | Facility: CLINIC | Age: 68
End: 2023-12-18
Payer: COMMERCIAL

## 2023-12-18 DIAGNOSIS — M54.50 CHRONIC BILATERAL LOW BACK PAIN WITHOUT SCIATICA: ICD-10-CM

## 2023-12-18 DIAGNOSIS — G89.29 CHRONIC BILATERAL LOW BACK PAIN WITHOUT SCIATICA: ICD-10-CM

## 2023-12-18 RX ORDER — MELOXICAM 15 MG/1
15 TABLET ORAL DAILY PRN
Qty: 90 TABLET | Refills: 0 | Status: SHIPPED | OUTPATIENT
Start: 2023-12-18 | End: 2024-01-22 | Stop reason: ALTCHOICE

## 2023-12-18 RX ORDER — BACLOFEN 10 MG/1
10 TABLET ORAL 2 TIMES DAILY
Qty: 180 TABLET | Refills: 1 | Status: SHIPPED | OUTPATIENT
Start: 2023-12-18 | End: 2024-01-22 | Stop reason: ALTCHOICE

## 2023-12-18 RX ORDER — INSULIN GLARGINE 100 [IU]/ML
20 INJECTION, SOLUTION SUBCUTANEOUS NIGHTLY
Qty: 30 ML | Refills: 1 | Status: SHIPPED | OUTPATIENT
Start: 2023-12-18

## 2023-12-21 NOTE — PROGRESS NOTES
Physical Therapy Evaluation    Patient Name: Silvio Noel  MRN: [id number]  Today's Date: 12/26/2023  Time Calculation  Start Time: 1210  Stop Time: 1305  Time Calculation (min): 55 min      Assessment   Pt demonstrates decreased lumbar/hip AROM, core/hip/knee strength, activity tolerance, postural impairments, gait deviations, pelvic mal-alignment, decreased flexibility, and pain resulting in difficulty with ADLs/functional mobility per JAELYN.  Pt will benefit from PT services to increase spine/hip mobility, improve stability of the trunk and LEs, improve alignment, reduce pain, and maximize ADLs/functional mobility    Pt verbalizes understanding of all pt education.     He exits denying increased s/s.      Plan  Treatment/Interventions: Cryotherapy, Education/ Instruction, Gait training, Hot pack, Manual therapy, Neuromuscular re-education, Self care/ home management, Therapeutic activities, Therapeutic exercises  PT Plan:  (1-2x/week for 10 visits)      Subjective   General:  General  Reason for Referral: Chronic (B) LBP without sciatica  Referred By: Dr. Jose Jernigan (PCP)    Precautions:  Precautions  STEADI Fall Risk Score (The score of 4 or more indicates an increased risk of falling): 3; lower risk of fall  Precautions Comment: 1. Pacemaker 2004  2. Coronary angioplasty x5 with most recent 2019, CAD (coronary artery disease), Ischemic cardiomyopathy, Ventricular tachycardia  3. Chronic kidney disease stage 3  4. Asthma treated with inhaler,  5. (L) shoulder pain    Pain:  Pain Assessment: 0-10  Pain Score: 1  Pain Location: Leg ((L) lower back into buttock to posterolateral LE, stopping at the ankle)  Multiple Pain Sites: Two    Pt arrives on time to appointment but time to complete paperwork results in a shortened session.    Pt is 68 year old male who presents with back and (L>R) LE pain.  Pt's s/s began on 8-2-22 without injury or significant event.  Following onset his s/s improved, in particular with  "Meloxicam/Baclofen, and has now plateau.  Pt denies changes in bladder/bowel and denies saddle paresthesias.  Pt denies being given precautions for current s/s by referring MD.    IMAGING: X-rays of the lumbar spine in 2022 state \"Mild lumbar degenerative changes.\"    PMHx: Pacemaker 2004, Coronary angioplasty x5 with most recent 2019, CAD (coronary artery disease), Chronic kidney disease stage 3, Depression with anxiety, DM, GERD, Hypothyroidism, Ischemic cardiomyopathy, Ventricular tachycardia, HTN, Asthma treated with inhaler, Hx MI 2001, Hx (R) ankle fx treated conservatively, (L) shoulder pain    ALLERGIES: Codeine, Hydrocodone-acetaminophen, Lisinopril, Losartan, Simvastatin    PAIN:   (L) lower back into buttock to posterolateral LE, stopping at the ankle  1/10 currently  Ranges 1/10 to 5/10  Described as ache, intermittent throbbing  (R) sided lower back   0/10 currently  Ranges 0/10 to 3/10  Described as dull ache   Improves with Rx NSAID, warm water from shower, and back brace  In general pt denies n+t in (B) Les  Pt has taken Meloxicam and Baclofen this morning    FUNCTIONAL:   - Current: Pt reports increased difficulty/pain with sitting to driving >10 - 15 min, laying SL, golfing (continues through the pain with a back brace), 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  His sleep is disrupted.  He wakes at least 2x/night d/t pain.  - Previous: Hx of  (R) sided LBP in his 30's that resolved.  After this and prior to onset in 2022 pt had no significant limitations or pain.    PATIENT GOAL: \"More flexibility, core strength\"    OCCUPATION: Musician    HOME: Condo with 2 steps to enter; stairs have rails      Objective   GAIT  Pt ambulates with decreased trunk rotation.  _______________________________________    ROM  Lumbar AROM:  - Extension  60%  - Flexion 70%, " "increased (L) LE s/s  - SB (B) 60% and increased s/s  - Rotation (B) 60%    Hip AROM;  - Extension (L) 5 deg     (R) 10 deg, no pain during but increased pain upon resting (B)  _______________________________________    STRENGTH  While seated pt demonstrates minimal TA activation with significant Valsalva compensation and neck/shoulder complex tensing.    Hip  - Extension (L) 4-/5      (R) 4/5  - ABD (B) 4+/5  - ER  (B) 4-/5  - IR  (B) 4+/5    Knee   - Extension (B) 4/5  - Flexion (B) 4+/5    Ankle  - DF (B) 4+/5  _________________________________________    SPECIAL TESTS  - Slump test (B) negative  - (B) SLS with UE support demonstrates contralateral hip hike  - (B) SLR with mild pelvic rock  ________________________________________    DERMATOMES  - L2 through L5 grossly intact to LT (B)  ___________________________________________    POSTURE  - Sitting: Pt tends to sit with significant weight shift/lean to (R) side   - With pt supine (L) medial malleolus, ASIS, and ilac crest are 3/4 finger width higher than (R)  ____________________________________________    PALPATION  Pt reports tenderness to palpation of (L) piriormis, (L) glute med  ________________________________________________    REFLEX  - Patellar reflexes 2+ (B)  _______________________________________________    FLEXIBILITY  - (B) Gastroc moderate limitations  - (B) Hamstring moderate limitations  - (B) Hip IR significant limitations  - (B) Hip ER minimal limitations       Outcome Measures:  Other Measures  Oswestry Disablity Index (JAELYN): 10/50 = 20%       TREATMENT  Visit 1 of 10 max  \"Abhishek\"  Insurance changing 1st of the year    Manual therapy to add:  - Lat release for (L) upslip    To discuss:  - Pt would like to return to the gym and use the elliiptical, TM, leg press    Exercises completed (*indicates on HEP):  - NuStep or TM  - (B) Gastroc stretch  - (B) Hamstring stretch  - (B) Hip flexor stretch  - Supine TA activation practice *watch for " compensations*  - Supine TA hip ABD  - TA brige  - Supine TA march?  - TB row? *careful (L) shoulder*  - Quadruped alt LE lifts *careful (L) shoulder*      OP EDUCATION:  PT IE: Pt educated on PT POC/benefits, okay discomfort vs pain that requires modification, and DOMS.   Reviewed basics of pelvic anatomy      Goals:  1. Pt will be (I) with HEP for carryover of gains from skilled PT.  2. Pt will report reduction in pain to 3/10 at worst for improved ability to complete ADLs and enjoyable activities.  3. Improved JAELYN score to demonstrate an improvement in quality of life.  4. Improved TA activation to at least moderate while standing and minimal to no compensations for improved stability.  5. Improved lumbar AROM, s/s-free for improved mobility.  6. Improved (B) LE strength to at least 4+/5 overall for improved stability.  7. Pt will be able to sit at least 30 min to drive around the community.  8. Pt will have minimal to no s/s when standing after sitting for 45 min.  9. Pt will have minimal to no s/s laying SL for quality of sleep.  10. 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  11. 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  12. Pt will report improved quality of sleep for improved QoL.

## 2023-12-26 ENCOUNTER — EVALUATION (OUTPATIENT)
Dept: PHYSICAL THERAPY | Facility: CLINIC | Age: 68
End: 2023-12-26
Payer: COMMERCIAL

## 2023-12-26 DIAGNOSIS — E11.59 TYPE 2 DIABETES MELLITUS WITH OTHER CIRCULATORY COMPLICATION, WITHOUT LONG-TERM CURRENT USE OF INSULIN (MULTI): ICD-10-CM

## 2023-12-26 DIAGNOSIS — R53.1 WEAKNESS: Primary | ICD-10-CM

## 2023-12-26 DIAGNOSIS — G89.29 CHRONIC BILATERAL LOW BACK PAIN WITHOUT SCIATICA: ICD-10-CM

## 2023-12-26 DIAGNOSIS — M54.50 CHRONIC BILATERAL LOW BACK PAIN WITHOUT SCIATICA: ICD-10-CM

## 2023-12-26 DIAGNOSIS — M25.60 STIFFNESS IN JOINT: ICD-10-CM

## 2023-12-26 PROCEDURE — 97163 PT EVAL HIGH COMPLEX 45 MIN: CPT | Mod: GP | Performed by: PHYSICAL THERAPIST

## 2023-12-26 RX ORDER — PEN NEEDLE, DIABETIC 30 GX3/16"
1 NEEDLE, DISPOSABLE MISCELLANEOUS DAILY
Qty: 100 EACH | Refills: 11 | Status: SHIPPED | OUTPATIENT
Start: 2023-12-26

## 2023-12-26 ASSESSMENT — PAIN - FUNCTIONAL ASSESSMENT: PAIN_FUNCTIONAL_ASSESSMENT: 0-10

## 2023-12-26 ASSESSMENT — PAIN SCALES - GENERAL
PAINLEVEL_OUTOF10: 1
PAINLEVEL_OUTOF10: 0 - NO PAIN

## 2023-12-26 ASSESSMENT — ENCOUNTER SYMPTOMS
LOSS OF SENSATION IN FEET: 0
DEPRESSION: 0
OCCASIONAL FEELINGS OF UNSTEADINESS: 0

## 2023-12-26 NOTE — Clinical Note
Assessment/Plan:    No problem-specific Assessment & Plan notes found for this encounter  Diagnoses and all orders for this visit:    Health maintenance examination  Normal exam    Need for pneumococcal vaccination  -     PNEUMOCOCCAL POLYSACCHARIDE VACCINE 23-VALENT =>3YO SQ IM    Mixed hyperlipidemia  -     Lipid panel; Future    Impaired fasting glucose  -     Comprehensive metabolic panel; Future  -     Hemoglobin A1C; Future    Seasonal allergies  -     montelukast (SINGULAIR) 10 mg tablet; Take 1 tablet (10 mg total) by mouth daily at bedtime    Other orders  -     Cholecalciferol (Vitamin D3) 20 MCG (800 UNIT) TABS; Take by mouth  -     Calcium Carbonate-Vit D-Min (CALCIUM 1200 PO); Take by mouth      Follow up in 1 year    Subjective:      Patient ID: Eliz Townsend is a 79 y o  female  Patient is here to follow up for an annual exam   She deneis any complaints today  Does not take any medications  The following portions of the patient's history were reviewed and updated as appropriate: She  has no past medical history on file       Review of Systems   Constitutional: Negative for activity change, appetite change, fatigue and fever  HENT: Negative for congestion and ear discharge  Respiratory: Negative for cough and shortness of breath  Cardiovascular: Negative for chest pain and palpitations  Gastrointestinal: Negative for diarrhea and nausea  Musculoskeletal: Negative for arthralgias and back pain  Skin: Negative for color change and rash  Neurological: Negative for dizziness and headaches  Psychiatric/Behavioral: Negative for agitation and behavioral problems  Objective:      /78   Pulse 64   Temp 98 8 °F (37 1 °C)   Ht 5' 4" (1 626 m)   Wt 72 1 kg (159 lb)   SpO2 99%   BMI 27 29 kg/m²          Physical Exam  Constitutional:       General: She is not in acute distress  Appearance: She is well-developed  She is not diaphoretic     HENT:      Head: December 26, 2023    Olesya Espinal PT  [address] [facility]  [address]  [facility] [address]    Patient: Silvio Noel   YOB: 1955   Date of Visit: 12/26/2023       Dear Jose Jernigan Do  [address] [facility], [address]  [place] [address]    The attached plan of care is being sent to you because your patient’s medical reimbursement requires that you certify the plan of care. Your signature is required to allow uninterrupted insurance coverage.      You may indicate your approval by signing below and faxing this form back to us at Dept Fax: [phone].    Please call Dept: [phone] with any questions or concerns.    Thank you for this referral,        Olesya Espinal PT  [facility] [address] [facility]  [address] [facility] [address]    Payer: Payor: [employer] / Plan: [employer]  HEALTHCARE / Product Type: *No Product type* /                                                                         Date:     Dear Olesya Espinal PT,     Re: Mr. Silvio Noel, MRN:[id number]    I certify that I have reviewed the attached plan of care and it is medically necessary for Mr. Silvio Noel (1955) who is under my care.          ______________________________________                    _________________  Provider name and credentials                                           Date and time                                                                                           Plan of Care 12/26/23   Effective from: 12/26/2023  Effective to: 3/25/2024    Plan ID: [id number]            Participants as of Finalize on 12/26/2023    Name Type Comments Contact Info    Jose Jernigan DO PCP - [employer] PCP  [phone]    Olesya Espinal PT Consulting Physician  [phone]       Last Plan Note     Author: Olesya Espinal PT Status: Signed Last edited: 12/26/2023 12:00 PM       Physical Therapy Evaluation    Patient Name: Silvio Noel  MRN: [id number]  Today's Date:  12/26/2023  Time Calculation  Start Time: 1210  Stop Time: 1305  Time Calculation (min): 55 min      Assessment   Pt demonstrates decreased lumbar/hip AROM, core/hip/knee strength, activity tolerance, postural impairments, gait deviations, pelvic mal-alignment, decreased flexibility, and pain resulting in difficulty with ADLs/functional mobility per JAELYN.  Pt will benefit from PT services to increase spine/hip mobility, improve stability of the trunk and LEs, improve alignment, reduce pain, and maximize ADLs/functional mobility    Pt verbalizes understanding of all pt education.     He exits denying increased s/s.      Plan  Treatment/Interventions: Cryotherapy, Education/ Instruction, Gait training, Hot pack, Manual therapy, Neuromuscular re-education, Self care/ home management, Therapeutic activities, Therapeutic exercises  PT Plan:  (1-2x/week for 10 visits)      Subjective   General:  General  Reason for Referral: Chronic (B) LBP without sciatica  Referred By: Dr. Jose Jernigan (PCP)    Precautions:  Precautions  STEADI Fall Risk Score (The score of 4 or more indicates an increased risk of falling): 3; lower risk of fall  Precautions Comment: 1. Pacemaker 2004  2. Coronary angioplasty x5 with most recent 2019, CAD (coronary artery disease), Ischemic cardiomyopathy, Ventricular tachycardia  3. Chronic kidney disease stage 3  4. Asthma treated with inhaler,  5. (L) shoulder pain    Pain:  Pain Assessment: 0-10  Pain Score: 1  Pain Location: Leg ((L) lower back into buttock to posterolateral LE, stopping at the ankle)  Multiple Pain Sites: Two    Pt arrives on time to appointment but time to complete paperwork results in a shortened session.    Pt is 68 year old male who presents with back and (L>R) LE pain.  Pt's s/s began on 8-2-22 without injury or significant event.  Following onset his s/s improved, in particular with Meloxicam/Baclofen, and has now plateau.  Pt denies changes in bladder/bowel and denies saddle  Normocephalic and atraumatic  Nose: Nose normal    Eyes:      Conjunctiva/sclera: Conjunctivae normal       Pupils: Pupils are equal, round, and reactive to light  Cardiovascular:      Rate and Rhythm: Normal rate and regular rhythm  Heart sounds: Normal heart sounds  No murmur heard  Pulmonary:      Effort: Pulmonary effort is normal  No respiratory distress  Breath sounds: Normal breath sounds  No wheezing  Abdominal:      General: Bowel sounds are normal  There is no distension  Palpations: Abdomen is soft  Tenderness: There is no abdominal tenderness  Skin:     General: Skin is warm and dry  Findings: No erythema or rash  Neurological:      Mental Status: She is alert and oriented to person, place, and time  "paresthesias.  Pt denies being given precautions for current s/s by referring MD.    IMAGING: X-rays of the lumbar spine in 2022 state \"Mild lumbar degenerative changes.\"    PMHx: Pacemaker 2004, Coronary angioplasty x5 with most recent 2019, CAD (coronary artery disease), Chronic kidney disease stage 3, Depression with anxiety, DM, GERD, Hypothyroidism, Ischemic cardiomyopathy, Ventricular tachycardia, HTN, Asthma treated with inhaler, Hx MI 2001, Hx (R) ankle fx treated conservatively, (L) shoulder pain    ALLERGIES: Codeine, Hydrocodone-acetaminophen, Lisinopril, Losartan, Simvastatin    PAIN:   (L) lower back into buttock to posterolateral LE, stopping at the ankle  1/10 currently  Ranges 1/10 to 5/10  Described as ache, intermittent throbbing  (R) sided lower back   0/10 currently  Ranges 0/10 to 3/10  Described as dull ache   Improves with Rx NSAID, warm water from shower, and back brace  In general pt denies n+t in (B) Les  Pt has taken Meloxicam and Baclofen this morning    FUNCTIONAL:   - Current: Pt reports increased difficulty/pain with sitting to driving >10 - 15 min, laying SL, golfing (continues through the pain with a back brace), 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  His sleep is disrupted.  He wakes at least 2x/night d/t pain.  - Previous: Hx of  (R) sided LBP in his 30's that resolved.  After this and prior to onset in 2022 pt had no significant limitations or pain.    PATIENT GOAL: \"More flexibility, core strength\"    OCCUPATION: Musician    HOME: Condo with 2 steps to enter; stairs have rails      Objective   GAIT  Pt ambulates with decreased trunk rotation.  _______________________________________    ROM  Lumbar AROM:  - Extension  60%  - Flexion 70%, increased (L) LE s/s  - SB (B) 60% and increased s/s  - Rotation (B) 60%    Hip AROM;  - Extension (L) " "5 deg     (R) 10 deg, no pain during but increased pain upon resting (B)  _______________________________________    STRENGTH  While seated pt demonstrates minimal TA activation with significant Valsalva compensation and neck/shoulder complex tensing.    Hip  - Extension (L) 4-/5      (R) 4/5  - ABD (B) 4+/5  - ER  (B) 4-/5  - IR  (B) 4+/5    Knee   - Extension (B) 4/5  - Flexion (B) 4+/5    Ankle  - DF (B) 4+/5  _________________________________________    SPECIAL TESTS  - Slump test (B) negative  - (B) SLS with UE support demonstrates contralateral hip hike  - (B) SLR with mild pelvic rock  ________________________________________    DERMATOMES  - L2 through L5 grossly intact to LT (B)  ___________________________________________    POSTURE  - Sitting: Pt tends to sit with significant weight shift/lean to (R) side   - With pt supine (L) medial malleolus, ASIS, and ilac crest are 3/4 finger width higher than (R)  ____________________________________________    PALPATION  Pt reports tenderness to palpation of (L) piriormis, (L) glute med  ________________________________________________    REFLEX  - Patellar reflexes 2+ (B)  _______________________________________________    FLEXIBILITY  - (B) Gastroc moderate limitations  - (B) Hamstring moderate limitations  - (B) Hip IR significant limitations  - (B) Hip ER minimal limitations       Outcome Measures:  Other Measures  Oswestry Disablity Index (JAELYN): 10/50 = 20%       TREATMENT  Visit 1 of 10 max  \"Abhishek\"  Insurance changing 1st of the year    Manual therapy to add:  - Lat release for (L) upslip    To discuss:  - Pt would like to return to the gym and use the elliiptical, TM, leg press    Exercises completed (*indicates on HEP):  - NuStep or TM  - (B) Gastroc stretch  - (B) Hamstring stretch  - (B) Hip flexor stretch  - Supine TA activation practice *watch for compensations*  - Supine TA hip ABD  - TA brige  - Supine TA march?  - TB row? *careful (L) shoulder*  - " Quadruped alt LE lifts *careful (L) shoulder*      OP EDUCATION:  PT IE: Pt educated on PT POC/benefits, okay discomfort vs pain that requires modification, and DOMS.   Reviewed basics of pelvic anatomy      Goals:  1. Pt will be (I) with HEP for carryover of gains from skilled PT.  2. Pt will report reduction in pain to 3/10 at worst for improved ability to complete ADLs and enjoyable activities.  3. Improved JAELYN score to demonstrate an improvement in quality of life.  4. Improved TA activation to at least moderate while standing and minimal to no compensations for improved stability.  5. Improved lumbar AROM, s/s-free for improved mobility.  6. Improved (B) LE strength to at least 4+/5 overall for improved stability.  7. Pt will be able to sit at least 30 min to drive around the community.  8. Pt will have minimal to no s/s when standing after sitting for 45 min.  9. Pt will have minimal to no s/s laying SL for quality of sleep.  10. 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  11. 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  12. Pt will report improved quality of sleep for improved QoL.           Current Participants as of 12/26/2023    Name Type Comments Contact Info    DO DOMENICO Fitzgerald - [employer] PCP  [phone]    Signature pending    Olesya Espinal PT Consulting Physician  [phone]    Signature pending

## 2023-12-28 NOTE — PROGRESS NOTES
Physical Therapy Treatment    Patient Name: Silvio Noel  MRN: [id number]  Today's Date: 1/2/2024  Time Calculation  Start Time: 1019  Stop Time: 1058  Time Calculation (min): 39 min      Assessment:   No adverse reaction to manual therapy.  Pt demonstrates improved pelvic alignment following release.  He had no pain during release and notes decreased pain following.    ROM exercises completed to improve joint mobility.  Strengthening completed to improve joint stability.    Pt was able to complete most exercises as requested.   Attempted hip flexor stretch standing and supine however unable for pt to get an appropriate stretch without onset of s/s so discontinued.   Upon completing TA hip ABD pt notes cramping at (L) side of back for last 1-2 reps that subsided with rest.  Reviewed to inform PT of any negative s/s.  Discussed with pt importance of strengthening these muscles but modifying reps.  On HEP requested pt attempt this exercise 5 x2 and modifying PRN.    HEP issued and pt verbalizes understanding of all pt education.    Pt exits the clinic with 0/10 pain.       Plan:  Continue with current PT POC and progress as tolerated.     Current Problem  1. Chronic bilateral low back pain without sciatica  Follow Up In Physical Therapy      2. Weakness  Follow Up In Physical Therapy      3. Stiffness in joint  Follow Up In Physical Therapy          Subjective   Precautions  1. Pacemaker 2004    2. Coronary angioplasty x5 with most recent 2019, CAD (coronary artery disease), Ischemic cardiomyopathy, Ventricular tachycardia    3. Chronic kidney disease stage 3    4. Asthma treated with inhaler    5. (L) shoulder pain    Pain  Pain Assessment: 0-10  Pain Score: 1  Pain Location: Leg    Pt enters with 1/10 (L) lower back into buttock to posterolateral LE, stopping at the ankle and (R) sided lower back pain.    Pt has no new complaints.      Objective   Vitals at start of session  HR: 75 bpm  O2 sat: 96%  BP:  "152/89    Treatments:  Visit 2 of 10 max  \"Abhishek\"  Insurance changing 1st of the year; no info at visit 2    Manual therapy completed 1-2-24 (included in [employer] charge):  - Lat release for (L) upslip    Need to discuss still:  - Pt would like to return to the gym and use the elliiptical, TM, leg press    Exercises completed (*indicates on HEP):  - NuStep, UE 10, level 3, 5'  - *(B) Gastroc stretch  - (B) Hip flexor stretch, attempted 1-2-24 but difficulty getting appropriate position for stretch  - *Supine TA activation practice, 5\" x5  - *Supine TA hip ABD, yellow TB, x10  - *LTR, 5\" hold, x5 ea  - *Bridge, x10    TRY hip flexor, half kneeling on foam  ADD - Supine TA march?  ADD - TB row? *careful (L) shoulder*  ADD - Quadruped alt LE lifts *careful (L) shoulder*  ADD - (B) Hamstring stretch eventually      OP EDUCATION:  1-2-24: Pt educated on manual therapy purpose and s/s to discontinue - pt consents  Reviewed okay discomfort vs pain that requires modification.  Pt educated on basic muscular anatomy of the core and pelvis.  PT IE: Pt educated on PT POC/benefits, okay discomfort vs pain that requires modification, and DOMS.   Reviewed basics of pelvic anatomy      Goals:  1. Pt will be (I) with HEP for carryover of gains from skilled PT.  2. Pt will report reduction in pain to 3/10 at worst for improved ability to complete ADLs and enjoyable activities.  3. Improved JAELYN score to demonstrate an improvement in quality of life.  4. Improved TA activation to at least moderate while standing and minimal to no compensations for improved stability.  5. Improved lumbar AROM, s/s-free for improved mobility.  6. Improved (B) LE strength to at least 4+/5 overall for improved stability.  7. Pt will be able to sit at least 30 min to drive around the community.  8. Pt will have minimal to no s/s when standing after sitting for 45 min.  9. Pt will have minimal to no s/s laying SL for quality of sleep.  10. 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  11. 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  12. Pt will report improved quality of sleep for improved QoL.

## 2024-01-02 ENCOUNTER — TREATMENT (OUTPATIENT)
Dept: PHYSICAL THERAPY | Facility: CLINIC | Age: 69
End: 2024-01-02
Payer: MEDICARE

## 2024-01-02 DIAGNOSIS — M25.60 STIFFNESS IN JOINT: ICD-10-CM

## 2024-01-02 DIAGNOSIS — M54.50 CHRONIC BILATERAL LOW BACK PAIN WITHOUT SCIATICA: ICD-10-CM

## 2024-01-02 DIAGNOSIS — G89.29 CHRONIC BILATERAL LOW BACK PAIN WITHOUT SCIATICA: ICD-10-CM

## 2024-01-02 DIAGNOSIS — R53.1 WEAKNESS: ICD-10-CM

## 2024-01-02 PROCEDURE — 97110 THERAPEUTIC EXERCISES: CPT | Mod: GP | Performed by: PHYSICAL THERAPIST

## 2024-01-02 ASSESSMENT — PAIN - FUNCTIONAL ASSESSMENT: PAIN_FUNCTIONAL_ASSESSMENT: 0-10

## 2024-01-02 ASSESSMENT — PAIN SCALES - GENERAL
PAINLEVEL_OUTOF10: 1
PAINLEVEL_OUTOF10: 1

## 2024-01-11 NOTE — PROGRESS NOTES
"Physical Therapy Treatment    Patient Name: Silvio Noel  MRN: [id number]  Today's Date: 1/11/2024         Assessment:       Plan:  Continue with current PT POC and progress as tolerated.     Current Problem  No diagnosis found.    Subjective   Precautions  1. Pacemaker 2004    2. Coronary angioplasty x5 with most recent 2019, CAD (coronary artery disease), Ischemic cardiomyopathy, Ventricular tachycardia    3. Chronic kidney disease stage 3    4. Asthma treated with inhaler    5. (L) shoulder pain    Pain             Objective   Treatments:  Visit 3 of 10 max  \"Abhishek\"  Insurance changing 1st of the year; no info at visit 2    Manual therapy completed 1-2-24 (included in [employer] charge):  - Lat release for (L) upslip    Need to discuss still:  - Pt would like to return to the gym and use the elliiptical, TM, leg press    Exercises completed (*indicates on HEP):  - NuStep, UE 10, level 3, 5'  - *(B) Gastroc stretch  - (B) Hip flexor stretch, attempted 1-2-24 but difficulty getting appropriate position for stretch  - *Supine TA activation practice, 5\" x5  - *Supine TA hip ABD, yellow TB, x10  - *LTR, 5\" hold, x5 ea  - *Bridge, x10    TRY hip flexor, half kneeling on foam  ADD - Supine TA march?  ADD - TB row? *careful (L) shoulder*  ADD - Quadruped alt LE lifts *careful (L) shoulder*  ADD - (B) Hamstring stretch eventually      OP EDUCATION:  1-2-24: Pt educated on manual therapy purpose and s/s to discontinue - pt consents  Reviewed okay discomfort vs pain that requires modification.  Pt educated on basic muscular anatomy of the core and pelvis.  PT IE: Pt educated on PT POC/benefits, okay discomfort vs pain that requires modification, and DOMS.   Reviewed basics of pelvic anatomy      Goals:  1. Pt will be (I) with HEP for carryover of gains from skilled PT.  2. Pt will report reduction in pain to 3/10 at worst for improved ability to complete ADLs and enjoyable activities.  3. Improved JAELYN score to demonstrate an " improvement in quality of life.  4. Improved TA activation to at least moderate while standing and minimal to no compensations for improved stability.  5. Improved lumbar AROM, s/s-free for improved mobility.  6. Improved (B) LE strength to at least 4+/5 overall for improved stability.  7. Pt will be able to sit at least 30 min to drive around the community.  8. Pt will have minimal to no s/s when standing after sitting for 45 min.  9. Pt will have minimal to no s/s laying SL for quality of sleep.  10. 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  11. 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  12. Pt will report improved quality of sleep for improved QoL.

## 2024-01-16 ENCOUNTER — APPOINTMENT (OUTPATIENT)
Dept: PHYSICAL THERAPY | Facility: CLINIC | Age: 69
End: 2024-01-16
Payer: MEDICARE

## 2024-01-16 NOTE — PROGRESS NOTES
Physical Therapy Treatment    Patient Name: Silvio Noel  MRN: [id number]  Today's Date: 1/23/2024  Time Calculation  Start Time: 1412  Stop Time: 1457  Time Calculation (min): 45 min      Assessment:   Pt's BP monitored during session and continues to be higher however exercise is not contraindicated at his levels noted during session per ACSM, AHA, and ACC.  Pt states he feels safe to exercise for today's session.    ROM exercises completed to improve joint mobility.  Strengthening completed to improve joint stability.    Pt was able to complete most exercises as requested.   Cueing providing for maintaining breathing during quadruped alt LE lifts with decent follow through.  Following hip flexor stretch and core exercises pt reports decreased pain levels (B) lower back.  Next attempted seated hamstring stretch.  With appropriate technique pt reports no increased s/s however pt noted increased (L) LBP when attempting to stretch laterally instead of just the hamstring.    HEP issued and pt verbalizes understanding of all pt education.    Pt exits with 3/10 (L) lower back into buttock and 0/10 (R) sided lower back.  Pt demonstrates improved upright posture while exiting the clinic vs entering.      Plan:  Continue with current PT POC and progress as tolerated.     Current Problem  1. Chronic bilateral low back pain without sciatica  Follow Up In Physical Therapy      2. Weakness  Follow Up In Physical Therapy      3. Stiffness in joint  Follow Up In Physical Therapy          Subjective   Precautions  1. Pacemaker 2004    2. Coronary angioplasty x5 with most recent 2019, CAD (coronary artery disease), Ischemic cardiomyopathy, Ventricular tachycardia    3. Chronic kidney disease stage 3    4. Asthma treated with inhaler    5. (L) shoulder pain    Pain  Pain Assessment: 0-10  Pain Score: 3  Pain Location: Leg    Pt enters with 4/10 (L) lower back into buttock and 3/10 (R) sided lower back.    Pt had to stop taking the  "Meloxicam and Baclofen d/t the medications increasing his blood pressure.  He has been F/U at ER over the weekend and PCP yesterday to address this.  He reports a new medication being added by PCP and that his BP has been improving a little.  Since stopping these medications he notes his pain has been worse.    He has not been completing his HEP.  He has been going to the gym and using an inversion chair to stretch his back and walk on the track/TM as well as elliptical and weight lifting machines.      Objective   VITALS  - /92  - HR: 72 bpm    Treatments:  Visit 3 of 10 max  \"Abhishek\"  Insurance changing 1st of the year; no info at visit 3    Exercises completed (*indicates on HEP):  - NuStep, UE 10, level 3, 6'  - *(B) Hip flexor, half kneeling on foam, 30\" x2 ea  - *TB row, red TB, 10 x3  - *Quadruped alt LE lifts, x10 ea  - *TB anti-rotation, red TB,   - *(B) Hamstring stretch, seated, 30\" x2 ea  - Seated on PB march, 10 x2  ADD - TB 3-way chop  ADD - TA mini squat    Not tested:  - *(B) Gastroc stretch  - (B) Hip flexor stretch, attempted 1-2-24 but difficulty getting appropriate position for stretch  - *LTR, 5\" hold, x5 ea  - *Supine TA activation practice, 5\" x5  - *Supine TA hip ABD, yellow TB, x10  - *Bridge, x10    Manual therapy completed 1-2-24:  - Lat release for (L) upslip      OP EDUCATION:  1-23-24: Reviewed that stretching should be gentle and never increase s/s  1-2-24: Pt educated on manual therapy purpose and s/s to discontinue - pt consents  Reviewed okay discomfort vs pain that requires modification.  Pt educated on basic muscular anatomy of the core and pelvis.  PT IE: Pt educated on PT POC/benefits, okay discomfort vs pain that requires modification, and DOMS.   Reviewed basics of pelvic anatomy      Goals:  1. Pt will be (I) with HEP for carryover of gains from skilled PT.  2. Pt will report reduction in pain to 3/10 at worst for improved ability to complete ADLs and enjoyable " activities.  3. Improved JAELYN score to demonstrate an improvement in quality of life.  4. Improved TA activation to at least moderate while standing and minimal to no compensations for improved stability.  5. Improved lumbar AROM, s/s-free for improved mobility.  6. Improved (B) LE strength to at least 4+/5 overall for improved stability.  7. Pt will be able to sit at least 30 min to drive around the community.  8. Pt will have minimal to no s/s when standing after sitting for 45 min.  9. Pt will have minimal to no s/s laying SL for quality of sleep.  10. 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  11. 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  12. Pt will report improved quality of sleep for improved QoL.

## 2024-01-19 ENCOUNTER — TELEPHONE (OUTPATIENT)
Dept: PRIMARY CARE | Facility: CLINIC | Age: 69
End: 2024-01-19

## 2024-01-19 NOTE — TELEPHONE ENCOUNTER
"Patient states that his blood pressure is going \"hay wire\" over the last 3 days.  States that it has been running 170/110-100.  Please advise.   "

## 2024-01-20 ENCOUNTER — HOSPITAL ENCOUNTER (EMERGENCY)
Facility: HOSPITAL | Age: 69
Discharge: HOME | End: 2024-01-20
Attending: EMERGENCY MEDICINE
Payer: MEDICARE

## 2024-01-20 ENCOUNTER — APPOINTMENT (OUTPATIENT)
Dept: CARDIOLOGY | Facility: HOSPITAL | Age: 69
End: 2024-01-20
Payer: MEDICARE

## 2024-01-20 VITALS
RESPIRATION RATE: 18 BRPM | DIASTOLIC BLOOD PRESSURE: 85 MMHG | BODY MASS INDEX: 35.61 KG/M2 | TEMPERATURE: 97.7 F | WEIGHT: 235 LBS | SYSTOLIC BLOOD PRESSURE: 136 MMHG | HEIGHT: 68 IN | OXYGEN SATURATION: 99 % | HEART RATE: 77 BPM

## 2024-01-20 DIAGNOSIS — I10 HYPERTENSION, UNSPECIFIED TYPE: Primary | ICD-10-CM

## 2024-01-20 LAB
ALBUMIN SERPL BCP-MCNC: 4.7 G/DL (ref 3.4–5)
ALP SERPL-CCNC: 46 U/L (ref 33–136)
ALT SERPL W P-5'-P-CCNC: 20 U/L (ref 10–52)
ANION GAP SERPL CALC-SCNC: 12 MMOL/L (ref 10–20)
AST SERPL W P-5'-P-CCNC: 30 U/L (ref 9–39)
BASOPHILS # BLD AUTO: 0.06 X10*3/UL (ref 0–0.1)
BASOPHILS NFR BLD AUTO: 0.6 %
BILIRUB SERPL-MCNC: 0.6 MG/DL (ref 0–1.2)
BUN SERPL-MCNC: 19 MG/DL (ref 6–23)
CALCIUM SERPL-MCNC: 10.5 MG/DL (ref 8.6–10.3)
CARDIAC TROPONIN I PNL SERPL HS: 6 NG/L (ref 0–20)
CARDIAC TROPONIN I PNL SERPL HS: 6 NG/L (ref 0–20)
CHLORIDE SERPL-SCNC: 99 MMOL/L (ref 98–107)
CO2 SERPL-SCNC: 27 MMOL/L (ref 21–32)
CREAT SERPL-MCNC: 1.19 MG/DL (ref 0.5–1.3)
EGFRCR SERPLBLD CKD-EPI 2021: 67 ML/MIN/1.73M*2
EOSINOPHIL # BLD AUTO: 0.45 X10*3/UL (ref 0–0.7)
EOSINOPHIL NFR BLD AUTO: 4.4 %
ERYTHROCYTE [DISTWIDTH] IN BLOOD BY AUTOMATED COUNT: 12.7 % (ref 11.5–14.5)
GLUCOSE SERPL-MCNC: 135 MG/DL (ref 74–99)
HCT VFR BLD AUTO: 46.4 % (ref 41–52)
HGB BLD-MCNC: 15.4 G/DL (ref 13.5–17.5)
IMM GRANULOCYTES # BLD AUTO: 0.02 X10*3/UL (ref 0–0.7)
IMM GRANULOCYTES NFR BLD AUTO: 0.2 % (ref 0–0.9)
LYMPHOCYTES # BLD AUTO: 3.38 X10*3/UL (ref 1.2–4.8)
LYMPHOCYTES NFR BLD AUTO: 33 %
MCH RBC QN AUTO: 29.6 PG (ref 26–34)
MCHC RBC AUTO-ENTMCNC: 33.2 G/DL (ref 32–36)
MCV RBC AUTO: 89 FL (ref 80–100)
MONOCYTES # BLD AUTO: 0.66 X10*3/UL (ref 0.1–1)
MONOCYTES NFR BLD AUTO: 6.5 %
NEUTROPHILS # BLD AUTO: 5.66 X10*3/UL (ref 1.2–7.7)
NEUTROPHILS NFR BLD AUTO: 55.3 %
NRBC BLD-RTO: 0 /100 WBCS (ref 0–0)
PLATELET # BLD AUTO: 211 X10*3/UL (ref 150–450)
POTASSIUM SERPL-SCNC: 4.4 MMOL/L (ref 3.5–5.3)
PROT SERPL-MCNC: 7.6 G/DL (ref 6.4–8.2)
RBC # BLD AUTO: 5.21 X10*6/UL (ref 4.5–5.9)
SODIUM SERPL-SCNC: 134 MMOL/L (ref 136–145)
WBC # BLD AUTO: 10.2 X10*3/UL (ref 4.4–11.3)

## 2024-01-20 PROCEDURE — 99285 EMERGENCY DEPT VISIT HI MDM: CPT | Performed by: EMERGENCY MEDICINE

## 2024-01-20 PROCEDURE — 85025 COMPLETE CBC W/AUTO DIFF WBC: CPT | Performed by: EMERGENCY MEDICINE

## 2024-01-20 PROCEDURE — 84484 ASSAY OF TROPONIN QUANT: CPT | Performed by: EMERGENCY MEDICINE

## 2024-01-20 PROCEDURE — 93005 ELECTROCARDIOGRAM TRACING: CPT

## 2024-01-20 PROCEDURE — 36415 COLL VENOUS BLD VENIPUNCTURE: CPT | Performed by: EMERGENCY MEDICINE

## 2024-01-20 PROCEDURE — 99283 EMERGENCY DEPT VISIT LOW MDM: CPT

## 2024-01-20 PROCEDURE — 99284 EMERGENCY DEPT VISIT MOD MDM: CPT | Performed by: EMERGENCY MEDICINE

## 2024-01-20 PROCEDURE — 80053 COMPREHEN METABOLIC PANEL: CPT | Performed by: EMERGENCY MEDICINE

## 2024-01-20 ASSESSMENT — PAIN SCALES - GENERAL: PAINLEVEL_OUTOF10: 0 - NO PAIN

## 2024-01-20 ASSESSMENT — LIFESTYLE VARIABLES
EVER FELT BAD OR GUILTY ABOUT YOUR DRINKING: NO
REASON UNABLE TO ASSESS: NO
HAVE YOU EVER FELT YOU SHOULD CUT DOWN ON YOUR DRINKING: NO
HAVE PEOPLE ANNOYED YOU BY CRITICIZING YOUR DRINKING: NO
EVER HAD A DRINK FIRST THING IN THE MORNING TO STEADY YOUR NERVES TO GET RID OF A HANGOVER: NO

## 2024-01-20 ASSESSMENT — COLUMBIA-SUICIDE SEVERITY RATING SCALE - C-SSRS
6. HAVE YOU EVER DONE ANYTHING, STARTED TO DO ANYTHING, OR PREPARED TO DO ANYTHING TO END YOUR LIFE?: NO
2. HAVE YOU ACTUALLY HAD ANY THOUGHTS OF KILLING YOURSELF?: NO
1. IN THE PAST MONTH, HAVE YOU WISHED YOU WERE DEAD OR WISHED YOU COULD GO TO SLEEP AND NOT WAKE UP?: NO

## 2024-01-20 ASSESSMENT — PAIN - FUNCTIONAL ASSESSMENT: PAIN_FUNCTIONAL_ASSESSMENT: 0-10

## 2024-01-20 NOTE — DISCHARGE INSTRUCTIONS
Seek immediate medical attention if you develop:  chest pain, nausea, vomiting, weakness, numbness, tingling, excessive sweating, shortness of breath, difficulty breathing, loss of motion in your arms or legs, or any new or worsening symptoms.    Follow up your elevated blood pressure with your doctor.

## 2024-01-20 NOTE — ED PROVIDER NOTES
HPI   Chief Complaint   Patient presents with    Hypertension     X several days       This is a 68-year-old male past medical history of CAD, hypertension who presents to the ED for hypertension, he does not normally check his blood pressure, but had a checked at the dental office on Tuesday when he had a tooth extraction, and was noted to have systolic blood pressure of 190, he brought himself a blood pressure cuff since then and after checking his blood pressure daily since then, he has a persistently elevated blood pressure, he denies any active symptoms of pain, headache, chest pain or shortness of breath.    He is currently on losartan 50 mg twice daily, atenolol 25 mg twice daily, has been taking his medications as prescribed.  He has not had any sick symptoms no nausea or vomiting, shortness of breath or fever/chills.                          No data recorded                Patient History   Past Medical History:   Diagnosis Date    Atherosclerotic heart disease of native coronary artery without angina pectoris 2019    Coronary artery disease without angina pectoris, unspecified vessel or lesion type, unspecified whether native or transplanted heart    Diverticulitis, colon 10/31/2023     Past Surgical History:   Procedure Laterality Date    OTHER SURGICAL HISTORY  2015    Cardioverter-defibrillator Pulse Generator Insertion    OTHER SURGICAL HISTORY  2021    Colonoscopy    OTHER SURGICAL HISTORY  2021    Percutaneous transluminal coronary angioplasty    OTHER SURGICAL HISTORY  2021    Cardiac catheterization    TONSILLECTOMY  2015    Tonsillectomy     No family history on file.  Social History     Tobacco Use    Smoking status: Former     Types: Cigarettes     Quit date: 2023     Years since quittin.4    Smokeless tobacco: Never   Vaping Use    Vaping Use: Never used   Substance Use Topics    Alcohol use: Yes     Alcohol/week: 5.0 standard drinks of alcohol      Types: 5 Standard drinks or equivalent per week    Drug use: Not Currently       Physical Exam   ED Triage Vitals [01/20/24 1447]   Temp Heart Rate Respirations BP   36.5 °C (97.7 °F) 74 18 (!) 189/93      Pulse Ox Temp Source Heart Rate Source Patient Position   97 % Temporal Monitor --      BP Location FiO2 (%)     -- --       Physical Exam  Constitutional:       Appearance: Normal appearance.   HENT:      Head: Normocephalic and atraumatic.      Mouth/Throat:      Mouth: Mucous membranes are moist.   Eyes:      Extraocular Movements: Extraocular movements intact.   Cardiovascular:      Rate and Rhythm: Normal rate and regular rhythm.      Heart sounds: Normal heart sounds. No murmur heard.  Pulmonary:      Effort: Pulmonary effort is normal. No respiratory distress.      Breath sounds: Normal breath sounds. No wheezing.   Abdominal:      General: There is no distension.      Palpations: Abdomen is soft.      Tenderness: There is no abdominal tenderness. There is no guarding.   Musculoskeletal:      Right lower leg: No edema.      Left lower leg: No edema.   Skin:     General: Skin is warm and dry.   Neurological:      General: No focal deficit present.      Mental Status: He is alert and oriented to person, place, and time.   Psychiatric:         Mood and Affect: Mood normal.         Behavior: Behavior normal.         ED Course & MDM   Diagnoses as of 01/20/24 1656   Hypertension, unspecified type       Medical Decision Making  Patient presents to the ED for asymptomatic hypertension, noted to be hypertensive on arrival to the ED.  He does report that he has been taking NSAIDs on a regular basis for chronic pain.  Laboratory studies including troponin, CMP CBC obtained prior to my assessment the patient.  CBC unremarkable, CMP does not demonstrate any renal function derangements, there is a minimally decreased sodium of 134, troponin is within normal limits, EKG nonischemic appearing.  Given this, he is  asymptomatic hypertension, he can be discharged home with outpatient PCP follow-up, given strict return precautions for any new or worsening symptoms    Discussed with the attending  Janusz Dorsey DO PGY-4  Emergency Medicine    =================Attending note===============    The patient was seen by the resident/fellow.  I have personally performed a substantive portion of the encounter.  I have seen and examined the patient; agree with the workup, evaluation, MDM,   management and diagnosis.  The care plan has been discussed with the resident; I have reviewed the resident's note and agree with the documented findings.      This is a 68 y.o. male who presents to ER with asymptomatic hypertension.  States he went to the dentist on Tuesday and his blood pressure was 177/110.  He states back in October his blood pressure was 140s over 90s when he saw his primary care doctor.  He is on atenolol and losartan.  He is not having any symptoms of hypertension.  There is no headaches.  No chest pain or shortness of breath.  No dizziness.  He has absolutely no complaints.  He states when he went home from the dentist he bought a blood pressure cuff and has been checking his blood pressure 5 times per day and it has been elevated.  Therefore he decided to come to the ER today.  He is on meloxicam and baclofen for some back issues that he started recently.  Meloxicam can cause some elevation in blood pressure.  He actually discontinued it 3 days ago.  He does have a history of coronary artery disease with 2 stents.    EKG: Normal sinus rhythm with a ventricular rate of 75.  .  QTc 457.  No ST elevations.    Heart is regular.  Lungs are clear.  Abdomen is soft and nontender.  No thyromegaly.    Blood pressure improved without any intervention.  Troponin negative.  Chemistry shows no acute renal failure.  No significant electrolyte abnormalities.  CBC is unremarkable.    Patient is discharged home.  He is to follow-up his  blood pressure with his doctor.  He is to return to the nearest ER for any new or worsening symptoms.  He is satisfied with this plan.            ==========================================          Procedure  Procedures     Dion Oliva,   01/20/24 1209

## 2024-01-22 ENCOUNTER — OFFICE VISIT (OUTPATIENT)
Dept: PRIMARY CARE | Facility: CLINIC | Age: 69
End: 2024-01-22
Payer: MEDICARE

## 2024-01-22 VITALS
OXYGEN SATURATION: 94 % | TEMPERATURE: 98.3 F | WEIGHT: 235 LBS | SYSTOLIC BLOOD PRESSURE: 169 MMHG | BODY MASS INDEX: 35.61 KG/M2 | RESPIRATION RATE: 18 BRPM | DIASTOLIC BLOOD PRESSURE: 99 MMHG | HEIGHT: 68 IN | HEART RATE: 70 BPM

## 2024-01-22 DIAGNOSIS — I10 HTN (HYPERTENSION), BENIGN: ICD-10-CM

## 2024-01-22 DIAGNOSIS — G47.33 OSA ON CPAP: ICD-10-CM

## 2024-01-22 DIAGNOSIS — I10 HYPERTENSION, UNSPECIFIED TYPE: Primary | ICD-10-CM

## 2024-01-22 DIAGNOSIS — I47.29 VENTRICULAR TACHYCARDIA (PAROXYSMAL) (MULTI): ICD-10-CM

## 2024-01-22 DIAGNOSIS — F17.200 TOBACCO USE DISORDER: ICD-10-CM

## 2024-01-22 DIAGNOSIS — Z00.00 HEALTH CARE MAINTENANCE: ICD-10-CM

## 2024-01-22 DIAGNOSIS — N18.30 STAGE 3 CHRONIC KIDNEY DISEASE, UNSPECIFIED WHETHER STAGE 3A OR 3B CKD (MULTI): ICD-10-CM

## 2024-01-22 PROCEDURE — 4010F ACE/ARB THERAPY RXD/TAKEN: CPT

## 2024-01-22 PROCEDURE — 3077F SYST BP >= 140 MM HG: CPT

## 2024-01-22 PROCEDURE — 3080F DIAST BP >= 90 MM HG: CPT

## 2024-01-22 PROCEDURE — 1160F RVW MEDS BY RX/DR IN RCRD: CPT

## 2024-01-22 PROCEDURE — 1126F AMNT PAIN NOTED NONE PRSNT: CPT

## 2024-01-22 PROCEDURE — 1159F MED LIST DOCD IN RCRD: CPT

## 2024-01-22 PROCEDURE — 99214 OFFICE O/P EST MOD 30 MIN: CPT

## 2024-01-22 RX ORDER — AMLODIPINE BESYLATE 5 MG/1
5 TABLET ORAL DAILY
Qty: 30 TABLET | Refills: 0 | Status: SHIPPED | OUTPATIENT
Start: 2024-01-22 | End: 2024-05-21 | Stop reason: WASHOUT

## 2024-01-22 RX ORDER — AMLODIPINE BESYLATE 5 MG/1
5 TABLET ORAL DAILY
Qty: 30 TABLET | Refills: 0 | Status: SHIPPED | OUTPATIENT
Start: 2024-01-22 | End: 2024-01-22 | Stop reason: ENTERED-IN-ERROR

## 2024-01-22 RX ORDER — ATENOLOL 50 MG/1
50 TABLET ORAL 2 TIMES DAILY
Qty: 90 TABLET | Refills: 1 | Status: SHIPPED | OUTPATIENT
Start: 2024-01-22 | End: 2024-05-21

## 2024-01-22 RX ORDER — ATENOLOL 50 MG/1
25 TABLET ORAL 2 TIMES DAILY
Qty: 90 TABLET | Refills: 1 | Status: SHIPPED | OUTPATIENT
Start: 2024-01-22 | End: 2024-01-22 | Stop reason: DRUGHIGH

## 2024-01-22 NOTE — PROGRESS NOTES
"Subjective   Patient ID: Silvio Noel is a 68 y.o. male who presents for ER Follow-up ([facility] ER follow up for elevated BP.).    Patient here today for recent ED follow-up.  Was at a dentist appointment last week and was noted to be hypertensive at that time.  States that blood pressures at his dentist office were in the 170s over 110s.  Was advised to monitor his blood pressure at home and follow-up with primary care.  States that over this last weekend had continue to monitor his blood pressure noted at to be rising at times with systolics in the 180s and diastolic blood pressures in the 120s.  Given these high numbers patient was alarmed and decided to go to the emergency department.  While in [facility] emergency room laboratory evaluations were largely within normal limits, EKG did not show any acute cardiac process and patient's blood pressure had gradually improved during ED course despite no pharmacological intervention.  Since that time patient is continue to monitor his blood pressures and has had continually elevated blood pressures with readings comparable to today's visit.  Has continued to deny any symptoms associated with his elevated blood pressure: Denies any headache, shortness of breath, back pain, chest pain, visual changes.  States that he has felt when blood pressure was elevated in the past and can at times tell that his pressures are high however has not felt that sensation over the last week.  His compliant with all his medications.  Is a current every day smoker approximately 1/2 pack/day.         Review of Systems    Objective   BP (!) 169/99 (BP Location: Right arm, Patient Position: Sitting)   Pulse 70   Temp 36.8 °C (98.3 °F)   Resp 18   Ht 1.727 m (5' 8\")   Wt 107 kg (235 lb)   SpO2 94%   BMI 35.73 kg/m²     Physical Exam  Constitutional:       General: He is not in acute distress.     Appearance: He is obese. He is not ill-appearing.   Cardiovascular:      Rate and Rhythm: " Normal rate and regular rhythm.      Pulses: Normal pulses.      Heart sounds: Normal heart sounds. No murmur heard.     No friction rub. No gallop.   Pulmonary:      Effort: Pulmonary effort is normal. No respiratory distress.      Breath sounds: Normal breath sounds. No stridor. No wheezing or rales.   Musculoskeletal:      Right lower leg: No edema.      Left lower leg: No edema.   Neurological:      General: No focal deficit present.      Mental Status: He is alert and oriented to person, place, and time.      Gait: Gait normal.         Assessment/Plan   Problem List Items Addressed This Visit             ICD-10-CM    Chronic kidney disease, stage 3 (CMS/[facility]) N18.30    MARY on CPAP G47.33    Ventricular tachycardia (paroxysmal) (CMS/[facility]) I47.29    Relevant Medications    atenolol (Tenormin) 50 mg tablet    amLODIPine (Norvasc) 5 mg tablet    Tobacco use disorder F17.200     Other Visit Diagnoses         Codes    Hypertension, unspecified type    -  Primary I10    Relevant Medications    amLODIPine (Norvasc) 5 mg tablet    Other Relevant Orders    Follow Up In [facility] - [facility] - Medicare Annual    HTN (hypertension), benign     I10    Relevant Medications    atenolol (Tenormin) 50 mg tablet    Health care maintenance     Z00.00    Relevant Orders    Follow Up In [facility] - PCP - Medicare Annual        Blood pressure remains uncontrolled at this time, manual recheck shows pressures to be persistently elevated.  Will add amlodipine 5 mg: Discuss risk/benefits associated with this medication along with adverse effects.  If blood pressure fails to remain controlled will potentially add diuretic medication.  Given concurrent T2DM along with evidence of CKD consider SGLT2 inhibitor for diuretic effects  Will assess effectiveness of Norvasc by having patient follow-up within the next 1 week.  Recommend that he monitor his blood pressures twice per day over the next week and come to the office with  these readings.  Additionally recommend to him to bring with him to next visit his home blood pressure cuff to assess for accurate home blood pressure readings.  Additionally patient due for annual Medicare wellness visit.  Will complete at follow-up.  Counseled patient on tobacco cessation however does not feel he is ready to quit at this time.

## 2024-01-23 ENCOUNTER — TREATMENT (OUTPATIENT)
Dept: PHYSICAL THERAPY | Facility: CLINIC | Age: 69
End: 2024-01-23
Payer: MEDICARE

## 2024-01-23 DIAGNOSIS — M54.50 CHRONIC BILATERAL LOW BACK PAIN WITHOUT SCIATICA: ICD-10-CM

## 2024-01-23 DIAGNOSIS — G89.29 CHRONIC BILATERAL LOW BACK PAIN WITHOUT SCIATICA: ICD-10-CM

## 2024-01-23 DIAGNOSIS — R53.1 WEAKNESS: ICD-10-CM

## 2024-01-23 DIAGNOSIS — M25.60 STIFFNESS IN JOINT: ICD-10-CM

## 2024-01-23 PROBLEM — Z87.891 FORMER SMOKER: Status: RESOLVED | Noted: 2023-10-31 | Resolved: 2024-01-23

## 2024-01-23 PROCEDURE — 97110 THERAPEUTIC EXERCISES: CPT | Mod: GP | Performed by: PHYSICAL THERAPIST

## 2024-01-23 ASSESSMENT — PAIN SCALES - GENERAL
PAINLEVEL_OUTOF10: 3
PAINLEVEL_OUTOF10: 4

## 2024-01-23 ASSESSMENT — PAIN - FUNCTIONAL ASSESSMENT: PAIN_FUNCTIONAL_ASSESSMENT: 0-10

## 2024-01-23 NOTE — PROGRESS NOTES
"Physical Therapy Treatment    Patient Name: Silvio Noel  MRN: [id number]  Today's Date: 1/25/2024  Time Calculation  Start Time: 1114  Stop Time: 1152  Time Calculation (min): 38 min      Assessment:   Pt's BP monitored during session with improvement noted.    ROM exercises completed to improve joint mobility.  Strengthening completed to improve joint stability.    Pt was able to complete most exercises as requested.   CGA initially required for bridge with PB transitioning to CGA/SBA as reps progressed.    HEP updated and pt verbalizes understanding of all pt education.    Pt exits with no pain.      Plan:  Continue with current PT POC and progress as tolerated.     Current Problem  1. Chronic bilateral low back pain without sciatica  Follow Up In Physical Therapy      2. Weakness  Follow Up In Physical Therapy      3. Stiffness in joint  Follow Up In Physical Therapy          Subjective   Precautions  1. Pacemaker 2004    2. Coronary angioplasty x5 with most recent 2019, CAD (coronary artery disease), Ischemic cardiomyopathy, Ventricular tachycardia    3. Chronic kidney disease stage 3    4. Asthma treated with inhaler    5. (L) shoulder pain    Pain  Pain Assessment: 0-10  Pain Score: 2  Pain Location: Leg ((L) lower back into buttock to posterolateral LE, stopping at the ankle)      Pt enters with 2/10 (L) lower back into buttock to posterolateral LE, stopping at the ankle and 0/10 (R) sided lower back.    He notes that his BP is slowly coming down but he is not sure his machine at home is accurate.    He felt fine after last PT session.  Since last session his pain levels have been lower overall compared to prior to PT.    He completed his HEP yesterday without concerns/questions.      Objective   VITALS  - BP: 134/82  - HR: 70 bpm    Treatments:  Visit 4 of 10 max  \"Abhishek\"  Insurance changing 1st of the year; no info at visit 4    Exercises completed (*indicates on HEP):  - NuStep, UE 10, level 3, 6'  - " "*Anti-rotation march, green TB,   - *TA mini squat, 10 x2  - *TB 3-way chop, red TB, 10 x2 ea  - Bridge with LEs on PB, UE at sides, SBA/CGA at PBx10  - *(B) Hip flexor, half kneeling on foam, 30\" x2 ea  - *(B) Hamstring stretch, seated, 30\" x2 ea  ADD - Dead bug PB knee push  ADD - Side stepping    Not tested:  - *(B) Gastroc stretch  - (B) Hip flexor stretch, attempted 1-2-24 but difficulty getting appropriate position for stretch  - *LTR, 5\" hold, x5 ea  - *Supine TA activation practice, 5\" x5  - *Supine TA hip ABD, yellow TB, x10  - *Bridge, x10  - Seated on PB march, 10 x2  - *TB row, red TB, 10 x3  - *Quadruped alt LE lifts, x10 ea  - *TB anti-rotation, red TB, x10 ea    Manual therapy completed 1-2-24:  - Lat release for (L) upslip      OP EDUCATION:  1-23-24: Reviewed that stretching should be gentle and never increase s/s  1-2-24: Pt educated on manual therapy purpose and s/s to discontinue - pt consents  Reviewed okay discomfort vs pain that requires modification.  Pt educated on basic muscular anatomy of the core and pelvis.  PT IE: Pt educated on PT POC/benefits, okay discomfort vs pain that requires modification, and DOMS.   Reviewed basics of pelvic anatomy      Goals:  1. Pt will be (I) with HEP for carryover of gains from skilled PT.  2. Pt will report reduction in pain to 3/10 at worst for improved ability to complete ADLs and enjoyable activities.  3. Improved JAELYN score to demonstrate an improvement in quality of life.  4. Improved TA activation to at least moderate while standing and minimal to no compensations for improved stability.  5. Improved lumbar AROM, s/s-free for improved mobility.  6. Improved (B) LE strength to at least 4+/5 overall for improved stability.  7. Pt will be able to sit at least 30 min to drive around the community.  8. Pt will have minimal to no s/s when standing after sitting for 45 min.  9. Pt will have minimal to no s/s laying SL for quality of sleep.  10. 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  11. 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  12. Pt will report improved quality of sleep for improved QoL.

## 2024-01-23 NOTE — PROGRESS NOTES
I saw and evaluated the patient. I personally obtained the key and critical portions of the history and physical exam or was physically present for key and critical portions performed by the resident. I reviewed the resident/fellow's documentation and discussed the patient with the resident/fellow. I agree with the resident/fellow's medical decision making as documented in the note.  Patient's cath and the new medication.  She will try to quit smoking.  Try to watch his diet and exercise.  Patient not have any chest pain or shortness of breath.  Will add medication.  Will monitor.  Patient aware if any chest pain shortness of breath any nausea vomiting diarrhea fever headache any concerning symptoms go to ER  Follow-up in 2 to 3 weeks  Agree with assessment and plan    Anthony Warren, DO

## 2024-01-25 ENCOUNTER — TREATMENT (OUTPATIENT)
Dept: PHYSICAL THERAPY | Facility: CLINIC | Age: 69
End: 2024-01-25
Payer: MEDICARE

## 2024-01-25 DIAGNOSIS — M54.50 CHRONIC BILATERAL LOW BACK PAIN WITHOUT SCIATICA: ICD-10-CM

## 2024-01-25 DIAGNOSIS — G89.29 CHRONIC BILATERAL LOW BACK PAIN WITHOUT SCIATICA: ICD-10-CM

## 2024-01-25 DIAGNOSIS — M25.60 STIFFNESS IN JOINT: ICD-10-CM

## 2024-01-25 DIAGNOSIS — R53.1 WEAKNESS: ICD-10-CM

## 2024-01-25 PROCEDURE — 97110 THERAPEUTIC EXERCISES: CPT | Mod: GP | Performed by: PHYSICAL THERAPIST

## 2024-01-25 ASSESSMENT — PAIN SCALES - GENERAL
PAINLEVEL_OUTOF10: 2
PAINLEVEL_OUTOF10: 0 - NO PAIN

## 2024-01-25 ASSESSMENT — PAIN - FUNCTIONAL ASSESSMENT: PAIN_FUNCTIONAL_ASSESSMENT: 0-10

## 2024-01-25 NOTE — PROGRESS NOTES
Physical Therapy Treatment    Patient Name: Silvio Noel  MRN: [id number]  Today's Date: 1/30/2024  Time Calculation  Start Time: 1405  Stop Time: 1444  Time Calculation (min): 39 min      Assessment:   Pt's BP monitored during session.    ROM exercises completed to improve joint mobility.  Strengthening completed to improve joint stability.    Pt was able to complete most exercises as requested.   Intermittent cueing for breathing throughout session with good follow through.    HEP updated and pt verbalizes understanding of all pt education.    Pt exits with 1-2/10 (L) lower back pain, a little tweak at the (L) hamstring, and no (R) sided lower back pain.      Plan:  Hold.  Pt to leave on vacation and will contact clinic for additional visits PRN upon returing    Current Problem  1. Chronic bilateral low back pain without sciatica  Follow Up In Physical Therapy      2. Weakness  Follow Up In Physical Therapy      3. Stiffness in joint  Follow Up In Physical Therapy          Subjective   Precautions  1. Pacemaker 2004    2. Coronary angioplasty x5 with most recent 2019, CAD (coronary artery disease), Ischemic cardiomyopathy, Ventricular tachycardia    3. Chronic kidney disease stage 3    4. Asthma treated with inhaler    5. (L) shoulder pain    Pain  Pain Assessment: 0-10  Pain Score: 5 - Moderate pain  Pain Location: Leg ((L) side of back/no radiation)    Pt had F/U with PCP for BP yesterday.  His BP was appropriate.    Pt notes this morning he woke up with intense pain.  He states this happens sometimes and he is uncertain why.    He completed his HEP today which helped reduce his pain levels.  He enters with 5/10 (L) lower back with no radiation and 1/10 (R) sided lower back.    He felt sore after the last PT session for the next day, in particular his arms.    Pt has begun modifying stretches that are painful to make pain-free.    Pt is leaving for vacation soon so will be on hold for PT.      Objective  "  VITALS  - BP: 110/74  - HR: 69 bpm    Treatments:  Visit 5 of 10 max  \"Abhishek\"  MC 90D 4-1-24    Exercises completed (*indicates on HEP):  - NuStep, UE 10, level 3, 7'  - Leg press DL, 110#, 10 x2  - Leg press SL (B), 70#, 10 x2  - Bridge with LEs on PB, UE over chest, SBA at PB, 10 x2  - *Dead bug PB knee push, 10 x2  - *Quadruped alt LE lifts, 5 sec hold, x10 ea  - Side stepping, yellow TB, x2 laps  - *(B) Hamstring stretch, seated, 30\" x2 ea    Not tested:  - *(B) Gastroc stretch  - (B) Hip flexor stretch, attempted 1-2-24 but difficulty getting appropriate position for stretch  - *LTR, 5\" hold, x5 ea  - *Supine TA activation practice, 5\" x5  - *Supine TA hip ABD, yellow TB, x10  - *Bridge, x10  - Seated on PB march, 10 x2  - *TB row, red TB, 10 x3  - *TB anti-rotation, red TB, x10 ea  - *TB 3-way chop, red TB, 10 x2 ea  - *TA mini squat, 10 x2  - *Anti-rotation march, green TB, 10 x2  - *(B) Hip flexor, half kneeling on foam, 30\" x2 ea    Manual therapy completed 1-2-24:  - Lat release for (L) upslip      OP EDUCATION:  1-30-24: Reviewed activity modification for returning to the gym   Discussed progressing HEP PRN  1-23-24: Reviewed that stretching should be gentle and never increase s/s  1-2-24: Pt educated on manual therapy purpose and s/s to discontinue - pt consents  Reviewed okay discomfort vs pain that requires modification.  Pt educated on basic muscular anatomy of the core and pelvis.  PT IE: Pt educated on PT POC/benefits, okay discomfort vs pain that requires modification, and DOMS.   Reviewed basics of pelvic anatomy      Goals:  1. Pt will be (I) with HEP for carryover of gains from skilled PT.  2. Pt will report reduction in pain to 3/10 at worst for improved ability to complete ADLs and enjoyable activities.  3. Improved JAELYN score to demonstrate an improvement in quality of life.  4. Improved TA activation to at least moderate while standing and minimal to no compensations for improved " stability.  5. Improved lumbar AROM, s/s-free for improved mobility.  6. Improved (B) LE strength to at least 4+/5 overall for improved stability.  7. Pt will be able to sit at least 30 min to drive around the community.  8. Pt will have minimal to no s/s when standing after sitting for 45 min.  9. Pt will have minimal to no s/s laying SL for quality of sleep.  10. 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  11. 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  12. Pt will report improved quality of sleep for improved QoL.

## 2024-01-29 ENCOUNTER — OFFICE VISIT (OUTPATIENT)
Dept: PRIMARY CARE | Facility: CLINIC | Age: 69
End: 2024-01-29
Payer: MEDICARE

## 2024-01-29 VITALS
BODY MASS INDEX: 35.61 KG/M2 | SYSTOLIC BLOOD PRESSURE: 126 MMHG | OXYGEN SATURATION: 95 % | HEART RATE: 74 BPM | RESPIRATION RATE: 18 BRPM | DIASTOLIC BLOOD PRESSURE: 78 MMHG | TEMPERATURE: 97.8 F | HEIGHT: 68 IN | WEIGHT: 235 LBS

## 2024-01-29 DIAGNOSIS — G47.33 OSA ON CPAP: ICD-10-CM

## 2024-01-29 DIAGNOSIS — E66.01 CLASS 2 SEVERE OBESITY DUE TO EXCESS CALORIES WITH SERIOUS COMORBIDITY AND BODY MASS INDEX (BMI) OF 35.0 TO 35.9 IN ADULT (MULTI): ICD-10-CM

## 2024-01-29 DIAGNOSIS — E03.9 HYPOTHYROIDISM, ADULT: ICD-10-CM

## 2024-01-29 DIAGNOSIS — M25.512 CHRONIC PAIN OF BOTH SHOULDERS: ICD-10-CM

## 2024-01-29 DIAGNOSIS — Z12.5 SCREENING FOR PROSTATE CANCER: ICD-10-CM

## 2024-01-29 DIAGNOSIS — Z12.11 SCREENING FOR COLORECTAL CANCER: ICD-10-CM

## 2024-01-29 DIAGNOSIS — Z11.4 SCREENING FOR HIV WITHOUT PRESENCE OF RISK FACTORS: ICD-10-CM

## 2024-01-29 DIAGNOSIS — Z12.12 SCREENING FOR COLORECTAL CANCER: ICD-10-CM

## 2024-01-29 DIAGNOSIS — Z87.891 PERSONAL HISTORY OF TOBACCO USE, PRESENTING HAZARDS TO HEALTH: ICD-10-CM

## 2024-01-29 DIAGNOSIS — Z11.59 NEED FOR HEPATITIS C SCREENING TEST: ICD-10-CM

## 2024-01-29 DIAGNOSIS — Z00.00 ROUTINE GENERAL MEDICAL EXAMINATION AT HEALTH CARE FACILITY: Primary | ICD-10-CM

## 2024-01-29 DIAGNOSIS — M25.511 CHRONIC PAIN OF BOTH SHOULDERS: ICD-10-CM

## 2024-01-29 DIAGNOSIS — G89.29 CHRONIC PAIN OF BOTH SHOULDERS: ICD-10-CM

## 2024-01-29 DIAGNOSIS — Z13.6 SCREENING FOR AAA (ABDOMINAL AORTIC ANEURYSM): ICD-10-CM

## 2024-01-29 PROCEDURE — 3048F LDL-C <100 MG/DL: CPT

## 2024-01-29 PROCEDURE — 1159F MED LIST DOCD IN RCRD: CPT

## 2024-01-29 PROCEDURE — 3008F BODY MASS INDEX DOCD: CPT

## 2024-01-29 PROCEDURE — 99397 PER PM REEVAL EST PAT 65+ YR: CPT

## 2024-01-29 PROCEDURE — 1170F FXNL STATUS ASSESSED: CPT

## 2024-01-29 PROCEDURE — 4010F ACE/ARB THERAPY RXD/TAKEN: CPT

## 2024-01-29 PROCEDURE — 3074F SYST BP LT 130 MM HG: CPT

## 2024-01-29 PROCEDURE — 3078F DIAST BP <80 MM HG: CPT

## 2024-01-29 PROCEDURE — 1126F AMNT PAIN NOTED NONE PRSNT: CPT

## 2024-01-29 SDOH — HEALTH STABILITY: PHYSICAL HEALTH: ON AVERAGE, HOW MANY DAYS PER WEEK DO YOU ENGAGE IN MODERATE TO STRENUOUS EXERCISE (LIKE A BRISK WALK)?: 4 DAYS

## 2024-01-29 SDOH — HEALTH STABILITY: PHYSICAL HEALTH: ON AVERAGE, HOW MANY MINUTES DO YOU ENGAGE IN EXERCISE AT THIS LEVEL?: 60 MIN

## 2024-01-29 SDOH — ECONOMIC STABILITY: FOOD INSECURITY: WITHIN THE PAST 12 MONTHS, THE FOOD YOU BOUGHT JUST DIDN'T LAST AND YOU DIDN'T HAVE MONEY TO GET MORE.: NEVER TRUE

## 2024-01-29 SDOH — ECONOMIC STABILITY: FOOD INSECURITY: WITHIN THE PAST 12 MONTHS, YOU WORRIED THAT YOUR FOOD WOULD RUN OUT BEFORE YOU GOT MONEY TO BUY MORE.: NEVER TRUE

## 2024-01-29 SDOH — ECONOMIC STABILITY: HOUSING INSECURITY
IN THE LAST 12 MONTHS, WAS THERE A TIME WHEN YOU DID NOT HAVE A STEADY PLACE TO SLEEP OR SLEPT IN A SHELTER (INCLUDING NOW)?: NO

## 2024-01-29 SDOH — ECONOMIC STABILITY: INCOME INSECURITY: IN THE LAST 12 MONTHS, WAS THERE A TIME WHEN YOU WERE NOT ABLE TO PAY THE MORTGAGE OR RENT ON TIME?: NO

## 2024-01-29 SDOH — ECONOMIC STABILITY: TRANSPORTATION INSECURITY
IN THE PAST 12 MONTHS, HAS THE LACK OF TRANSPORTATION KEPT YOU FROM MEDICAL APPOINTMENTS OR FROM GETTING MEDICATIONS?: NO

## 2024-01-29 SDOH — ECONOMIC STABILITY: GENERAL
WHICH OF THE FOLLOWING DO YOU KNOW HOW TO USE AND HAVE ACCESS TO EVERY DAY? (CHOOSE ALL THAT APPLY): DESKTOP COMPUTER, LAPTOP COMPUTER, OR TABLET WITH BROADBAND INTERNET CONNECTION;SMARTPHONE WITH CELLULAR DATA PLAN

## 2024-01-29 SDOH — ECONOMIC STABILITY: TRANSPORTATION INSECURITY
IN THE PAST 12 MONTHS, HAS LACK OF TRANSPORTATION KEPT YOU FROM MEETINGS, WORK, OR FROM GETTING THINGS NEEDED FOR DAILY LIVING?: NO

## 2024-01-29 ASSESSMENT — SOCIAL DETERMINANTS OF HEALTH (SDOH)
WITHIN THE LAST YEAR, HAVE TO BEEN RAPED OR FORCED TO HAVE ANY KIND OF SEXUAL ACTIVITY BY YOUR PARTNER OR EX-PARTNER?: NO
HOW OFTEN DO YOU GET TOGETHER WITH FRIENDS OR RELATIVES?: MORE THAN THREE TIMES A WEEK
WITHIN THE LAST YEAR, HAVE YOU BEEN HUMILIATED OR EMOTIONALLY ABUSED IN OTHER WAYS BY YOUR PARTNER OR EX-PARTNER?: NO
HOW HARD IS IT FOR YOU TO PAY FOR THE VERY BASICS LIKE FOOD, HOUSING, MEDICAL CARE, AND HEATING?: NOT HARD AT ALL
WITHIN THE LAST YEAR, HAVE YOU BEEN KICKED, HIT, SLAPPED, OR OTHERWISE PHYSICALLY HURT BY YOUR PARTNER OR EX-PARTNER?: NO
WITHIN THE LAST YEAR, HAVE YOU BEEN AFRAID OF YOUR PARTNER OR EX-PARTNER?: NO
HOW OFTEN DO YOU ATTEND CHURCH OR RELIGIOUS SERVICES?: NEVER
IN THE PAST 12 MONTHS, HAS THE ELECTRIC, GAS, OIL, OR WATER COMPANY THREATENED TO SHUT OFF SERVICE IN YOUR HOME?: NO
IN A TYPICAL WEEK, HOW MANY TIMES DO YOU TALK ON THE PHONE WITH FAMILY, FRIENDS, OR NEIGHBORS?: MORE THAN THREE TIMES A WEEK
DO YOU BELONG TO ANY CLUBS OR ORGANIZATIONS SUCH AS CHURCH GROUPS UNIONS, FRATERNAL OR ATHLETIC GROUPS, OR SCHOOL GROUPS?: NO
HOW OFTEN DO YOU ATTENT MEETINGS OF THE CLUB OR ORGANIZATION YOU BELONG TO?: NEVER

## 2024-01-29 ASSESSMENT — ACTIVITIES OF DAILY LIVING (ADL)
DRESSING: INDEPENDENT
DRESSING: INDEPENDENT
BATHING: INDEPENDENT
GROCERY_SHOPPING: INDEPENDENT
DOING_HOUSEWORK: INDEPENDENT
DOING_HOUSEWORK: INDEPENDENT
TAKING_MEDICATION: INDEPENDENT
TAKING_MEDICATION: INDEPENDENT
GROCERY_SHOPPING: INDEPENDENT
MANAGING_FINANCES: INDEPENDENT
MANAGING_FINANCES: INDEPENDENT
BATHING: INDEPENDENT

## 2024-01-29 ASSESSMENT — ENCOUNTER SYMPTOMS
LOSS OF SENSATION IN FEET: 0
OCCASIONAL FEELINGS OF UNSTEADINESS: 0
DEPRESSION: 0

## 2024-01-29 ASSESSMENT — LIFESTYLE VARIABLES
SKIP TO QUESTIONS 9-10: 0
HOW MANY STANDARD DRINKS CONTAINING ALCOHOL DO YOU HAVE ON A TYPICAL DAY: 3 OR 4
HOW OFTEN DO YOU HAVE SIX OR MORE DRINKS ON ONE OCCASION: NEVER
AUDIT-C TOTAL SCORE: 5
HOW OFTEN DO YOU HAVE A DRINK CONTAINING ALCOHOL: 4 OR MORE TIMES A WEEK

## 2024-01-29 ASSESSMENT — PATIENT HEALTH QUESTIONNAIRE - PHQ9
2. FEELING DOWN, DEPRESSED OR HOPELESS: NOT AT ALL
2. FEELING DOWN, DEPRESSED OR HOPELESS: NOT AT ALL
1. LITTLE INTEREST OR PLEASURE IN DOING THINGS: NOT AT ALL
SUM OF ALL RESPONSES TO PHQ9 QUESTIONS 1 & 2: 0
SUM OF ALL RESPONSES TO PHQ9 QUESTIONS 1 AND 2: 0
1. LITTLE INTEREST OR PLEASURE IN DOING THINGS: NOT AT ALL

## 2024-01-29 NOTE — PROGRESS NOTES
Subjective   Patient ID: Silvio Noel is a 68 y.o. male who presents for Medicare Annual Wellness Visit Subsequent (Medicare annual physical).    Patient here today for medicare annual wellness and BP follow up  Taking medications as prescribed, no reported adverse effects.  BP well controlled in office today however states that home cuff will frequently register SBP in 160-170, DBP in 90s  Patient is an everyday smoker, updated pack per year in EMR.  No desire to quit at this time  Patient does have first-degree relatives with previous colon cancer, has had past colonoscopy however is uncertain of frequency in which she is to receive repeat colonoscopies, does not know results of colonoscopy.  Per EMR recorded as 2012.  Denies any recent unexpected weight changes, changes to stool, melenic stool or hematochezia.  Patient does not have a living will or medical power of .  Is independent with IDLs and ADLs, lives at home with his wife.  Acute complaints at this time patient is endorsing some bilateral shoulder pain, left greater than right.  He has previously had a diagnosis of osteoarthritis however has not had evaluation with orthopedic surgery per patient.  States that pain is chronic denies any acute worsening however does describe a gradual, indolent progression over the last several years.  States that he is aware of his diagnosis of sleep apnea however does not wear home CPAP while he is sleeping as he does not tolerate mask on his face.  Has not worn for over the last 1 year.  Patient has not regularly physically active.  Is currently following with endocrinology regarding patient diabetes.  He states his mood to be normal at this time, is currently taking fluoxetine and tolerating this medication well.  12 point review of systems was normal unless stated otherwise in a forementioned narrative.         Review of Systems    Objective   /78 (BP Location: Left arm, Patient Position: Sitting)    "Pulse 74   Temp 36.6 °C (97.8 °F)   Resp 18   Ht 1.727 m (5' 8\")   Wt 107 kg (235 lb)   SpO2 95%   BMI 35.73 kg/m²     Physical Exam  Constitutional:       General: He is not in acute distress.     Appearance: He is obese. He is not ill-appearing.   HENT:      Right Ear: Tympanic membrane normal.      Left Ear: Tympanic membrane normal.      Nose: Nose normal. No congestion.      Mouth/Throat:      Pharynx: No oropharyngeal exudate or posterior oropharyngeal erythema.   Eyes:      Extraocular Movements: Extraocular movements intact.      Pupils: Pupils are equal, round, and reactive to light.   Neck:      Vascular: No carotid bruit.   Cardiovascular:      Rate and Rhythm: Normal rate and regular rhythm.      Pulses: Normal pulses.      Heart sounds: Normal heart sounds. No murmur heard.     No friction rub. No gallop.   Pulmonary:      Effort: Pulmonary effort is normal.      Breath sounds: Normal breath sounds. No wheezing, rhonchi or rales.   Abdominal:      General: Abdomen is flat. There is no distension.      Palpations: Abdomen is soft. There is no mass.      Tenderness: There is no abdominal tenderness. There is no guarding.   Musculoskeletal:      Right lower leg: No edema.      Left lower leg: No edema.   Lymphadenopathy:      Cervical: No cervical adenopathy.   Skin:     General: Skin is warm and dry.      Capillary Refill: Capillary refill takes less than 2 seconds.      Findings: No rash.   Neurological:      General: No focal deficit present.      Mental Status: He is alert and oriented to person, place, and time.      Cranial Nerves: No cranial nerve deficit.   Psychiatric:         Mood and Affect: Mood normal.         Behavior: Behavior normal.         Assessment/Plan   Problem List Items Addressed This Visit             ICD-10-CM    Hypothyroidism, adult E03.9    Relevant Orders    Tsh With Reflex To Free T4 If Abnormal    MARY on CPAP G47.33    Relevant Orders    Referral to Adult Sleep " Medicine     Other Visit Diagnoses         Codes    Routine general medical examination at health care facility    -  Primary Z00.00    Chronic pain of both shoulders     M25.511, G89.29, M25.512    Relevant Orders    Referral to Orthopaedic Surgery    Class 2 severe obesity due to excess calories with serious comorbidity and body mass index (BMI) of 35.0 to 35.9 in adult (CMS/[facility])     E66.01, Z68.35    Relevant Orders    Lipid panel    Personal history of tobacco use, presenting hazards to health     Z87.891    Relevant Orders    CT lung screening low dose    Need for hepatitis C screening test     Z11.59    Relevant Orders    Hepatitis C antibody    Screening for AAA (abdominal aortic aneurysm)     Z13.6    Relevant Orders    Vascular US abdominal aorta anuerysm AAA screening    Screening for colorectal cancer     Z12.11, Z12.12    Relevant Orders    Colonoscopy Screening; High Risk Patient; brother with colon cancer    Screening for HIV without presence of risk factors     Z11.4    Relevant Orders    HIV 1/2 Antigen/Antibody Screen with Reflex to Confirmation    Screening for prostate cancer     Z12.5    Relevant Orders    Prostate Specific Antigen, Screen        Patient currently not following with endocrinology regarding hypothyroidism.  Will obtain TSH at this time.  No recent unexpected weight gain, cold intolerance or other hypothyroidism symptoms the patient is endorsing.  Regarding his noncompliance with CPAP will refer to sleep medicine at this time.  Discussed the risk associated with untreated sleep apnea and its potential contributing factor to patient's underlying hypertension.  Patient is agreeable to being evaluated by sleep medicine for alterations to CPAP mask so that he is able to tolerate the device while he is sleeping.  Discussed with potential alteration in CPAP delivery and he states to be agreeable to this.  Regarding chronic shoulder pain and likely underlying osteoarthritis as well refer  patient to orthopedic surgery at this time for evaluation.  Counseled patient on smoking cessation however is unwilling to quit at this time.  Will order CT lung cancer screening along with screening for AAA.  Given patient's high risk for colonoscopy and will ask evaluation greater than 10 years ago will repeat colonoscopy at this time.  Patient has not had any unexpected weight changes or weight loss.  Counseled patient on obesity do recommend that patient begin formal exercise program including 150 minutes of cardiovascular exercise weekly along with strength training 2 days/week.  If is on tolerant to exercise recommend that he follow-up with the clinic regarding this.  Will additionally obtain basic labs at this time including lipid panel, HIV/HCV screening and PSA.  Patient does endorse weak stream and some nocturia however no family history of prostate cancer.  Blood pressure appears well-controlled at this time.  Evaluated patient's home machine which appears to be consistent with blood pressure cuff in office.  Will continue with current treatment.

## 2024-01-30 ENCOUNTER — TREATMENT (OUTPATIENT)
Dept: PHYSICAL THERAPY | Facility: CLINIC | Age: 69
End: 2024-01-30
Payer: MEDICARE

## 2024-01-30 ENCOUNTER — LAB (OUTPATIENT)
Dept: LAB | Facility: LAB | Age: 69
End: 2024-01-30
Payer: MEDICARE

## 2024-01-30 ENCOUNTER — APPOINTMENT (OUTPATIENT)
Dept: PRIMARY CARE | Facility: CLINIC | Age: 69
End: 2024-01-30
Payer: COMMERCIAL

## 2024-01-30 DIAGNOSIS — Z11.59 NEED FOR HEPATITIS C SCREENING TEST: ICD-10-CM

## 2024-01-30 DIAGNOSIS — M54.50 CHRONIC BILATERAL LOW BACK PAIN WITHOUT SCIATICA: ICD-10-CM

## 2024-01-30 DIAGNOSIS — E78.2 MIXED HYPERLIPIDEMIA: ICD-10-CM

## 2024-01-30 DIAGNOSIS — E03.9 HYPOTHYROIDISM, ADULT: ICD-10-CM

## 2024-01-30 DIAGNOSIS — M25.60 STIFFNESS IN JOINT: ICD-10-CM

## 2024-01-30 DIAGNOSIS — Z12.5 SCREENING FOR PROSTATE CANCER: ICD-10-CM

## 2024-01-30 DIAGNOSIS — G89.29 CHRONIC BILATERAL LOW BACK PAIN WITHOUT SCIATICA: ICD-10-CM

## 2024-01-30 DIAGNOSIS — R53.1 WEAKNESS: ICD-10-CM

## 2024-01-30 DIAGNOSIS — Z11.4 SCREENING FOR HIV WITHOUT PRESENCE OF RISK FACTORS: ICD-10-CM

## 2024-01-30 LAB
ALBUMIN SERPL BCP-MCNC: 4.6 G/DL (ref 3.4–5)
ANION GAP SERPL CALC-SCNC: 13 MMOL/L (ref 10–20)
BUN SERPL-MCNC: 18 MG/DL (ref 6–23)
CALCIUM SERPL-MCNC: 11.7 MG/DL (ref 8.6–10.3)
CHLORIDE SERPL-SCNC: 100 MMOL/L (ref 98–107)
CHOLEST SERPL-MCNC: 85 MG/DL (ref 0–199)
CHOLESTEROL/HDL RATIO: 3.4
CO2 SERPL-SCNC: 31 MMOL/L (ref 21–32)
CREAT SERPL-MCNC: 1.13 MG/DL (ref 0.5–1.3)
EGFRCR SERPLBLD CKD-EPI 2021: 72 ML/MIN/1.73M*2
GLUCOSE SERPL-MCNC: 147 MG/DL (ref 74–99)
HCV AB SER QL: NONREACTIVE
HDLC SERPL-MCNC: 24.9 MG/DL
HIV 1+2 AB+HIV1 P24 AG SERPL QL IA: NONREACTIVE
LDLC SERPL CALC-MCNC: 18 MG/DL
NON HDL CHOLESTEROL: 60 MG/DL (ref 0–149)
PHOSPHATE SERPL-MCNC: 3.1 MG/DL (ref 2.5–4.9)
POTASSIUM SERPL-SCNC: 4.6 MMOL/L (ref 3.5–5.3)
PSA SERPL-MCNC: 2.32 NG/ML
SODIUM SERPL-SCNC: 139 MMOL/L (ref 136–145)
TRIGL SERPL-MCNC: 213 MG/DL (ref 0–149)
TSH SERPL-ACNC: 0.51 MIU/L (ref 0.44–3.98)
VLDL: 43 MG/DL (ref 0–40)

## 2024-01-30 PROCEDURE — 36415 COLL VENOUS BLD VENIPUNCTURE: CPT

## 2024-01-30 PROCEDURE — 84443 ASSAY THYROID STIM HORMONE: CPT

## 2024-01-30 PROCEDURE — 80061 LIPID PANEL: CPT

## 2024-01-30 PROCEDURE — G0103 PSA SCREENING: HCPCS

## 2024-01-30 PROCEDURE — 80069 RENAL FUNCTION PANEL: CPT

## 2024-01-30 PROCEDURE — 97110 THERAPEUTIC EXERCISES: CPT | Mod: GP | Performed by: PHYSICAL THERAPIST

## 2024-01-30 PROCEDURE — 87389 HIV-1 AG W/HIV-1&-2 AB AG IA: CPT

## 2024-01-30 PROCEDURE — 86803 HEPATITIS C AB TEST: CPT

## 2024-01-30 ASSESSMENT — PAIN SCALES - GENERAL
PAINLEVEL_OUTOF10: 5 - MODERATE PAIN
PAINLEVEL_OUTOF10: 1

## 2024-01-30 ASSESSMENT — PAIN - FUNCTIONAL ASSESSMENT: PAIN_FUNCTIONAL_ASSESSMENT: 0-10

## 2024-01-31 NOTE — PROGRESS NOTES
I reviewed the resident/fellow's documentation and discussed the patient with the resident. I agree with the resident medical decision making as documented in the note.   Patient is to continue his medications.  Get his screening test.  He needs to start using his CPAP.  Patient can follow-up in 1 month  If any chest pain shortness of breath any nausea vomiting diarrhea fever headache any concerning symptoms go to the ER  Agree with assessment and plan  Anthony Warren, DO

## 2024-02-07 DIAGNOSIS — E83.52 HYPERCALCEMIA: Primary | ICD-10-CM

## 2024-02-13 LAB
ATRIAL RATE: 75 BPM
P AXIS: 44 DEGREES
P OFFSET: 193 MS
P ONSET: 146 MS
PR INTERVAL: 140 MS
Q ONSET: 216 MS
QRS COUNT: 12 BEATS
QRS DURATION: 98 MS
QT INTERVAL: 410 MS
QTC CALCULATION(BAZETT): 457 MS
QTC FREDERICIA: 441 MS
R AXIS: 19 DEGREES
T AXIS: 43 DEGREES
T OFFSET: 421 MS
VENTRICULAR RATE: 75 BPM

## 2024-02-19 ENCOUNTER — HOSPITAL ENCOUNTER (OUTPATIENT)
Dept: RADIOLOGY | Facility: CLINIC | Age: 69
Discharge: HOME | End: 2024-02-19
Payer: MEDICARE

## 2024-02-19 ENCOUNTER — OFFICE VISIT (OUTPATIENT)
Dept: ORTHOPEDIC SURGERY | Facility: CLINIC | Age: 69
End: 2024-02-19
Payer: MEDICARE

## 2024-02-19 DIAGNOSIS — M25.819 SHOULDER IMPINGEMENT: ICD-10-CM

## 2024-02-19 DIAGNOSIS — M25.512 CHRONIC PAIN OF BOTH SHOULDERS: ICD-10-CM

## 2024-02-19 DIAGNOSIS — G89.29 CHRONIC PAIN OF BOTH SHOULDERS: ICD-10-CM

## 2024-02-19 DIAGNOSIS — M25.511 CHRONIC PAIN OF BOTH SHOULDERS: ICD-10-CM

## 2024-02-19 PROCEDURE — 3048F LDL-C <100 MG/DL: CPT | Performed by: ORTHOPAEDIC SURGERY

## 2024-02-19 PROCEDURE — 1126F AMNT PAIN NOTED NONE PRSNT: CPT | Performed by: ORTHOPAEDIC SURGERY

## 2024-02-19 PROCEDURE — 73030 X-RAY EXAM OF SHOULDER: CPT | Mod: BILATERAL PROCEDURE | Performed by: ORTHOPAEDIC SURGERY

## 2024-02-19 PROCEDURE — 99203 OFFICE O/P NEW LOW 30 MIN: CPT | Performed by: ORTHOPAEDIC SURGERY

## 2024-02-19 PROCEDURE — 20610 DRAIN/INJ JOINT/BURSA W/O US: CPT | Mod: LT | Performed by: ORTHOPAEDIC SURGERY

## 2024-02-19 PROCEDURE — 99213 OFFICE O/P EST LOW 20 MIN: CPT | Performed by: ORTHOPAEDIC SURGERY

## 2024-02-19 PROCEDURE — 2500000005 HC RX 250 GENERAL PHARMACY W/O HCPCS: Performed by: ORTHOPAEDIC SURGERY

## 2024-02-19 PROCEDURE — 4010F ACE/ARB THERAPY RXD/TAKEN: CPT | Performed by: ORTHOPAEDIC SURGERY

## 2024-02-19 PROCEDURE — 2500000004 HC RX 250 GENERAL PHARMACY W/ HCPCS (ALT 636 FOR OP/ED): Performed by: ORTHOPAEDIC SURGERY

## 2024-02-19 PROCEDURE — 73030 X-RAY EXAM OF SHOULDER: CPT | Mod: 50

## 2024-02-19 PROCEDURE — 1159F MED LIST DOCD IN RCRD: CPT | Performed by: ORTHOPAEDIC SURGERY

## 2024-02-19 PROCEDURE — 3008F BODY MASS INDEX DOCD: CPT | Performed by: ORTHOPAEDIC SURGERY

## 2024-02-19 RX ORDER — BETAMETHASONE SODIUM PHOSPHATE AND BETAMETHASONE ACETATE 3; 3 MG/ML; MG/ML
12 INJECTION, SUSPENSION INTRA-ARTICULAR; INTRALESIONAL; INTRAMUSCULAR; SOFT TISSUE
Status: COMPLETED | OUTPATIENT
Start: 2024-02-19 | End: 2024-02-19

## 2024-02-19 RX ORDER — LIDOCAINE HYDROCHLORIDE 10 MG/ML
5 INJECTION INFILTRATION; PERINEURAL
Status: COMPLETED | OUTPATIENT
Start: 2024-02-19 | End: 2024-02-19

## 2024-02-19 RX ADMIN — LIDOCAINE HYDROCHLORIDE 5 ML: 10 INJECTION, SOLUTION INFILTRATION; PERINEURAL at 10:07

## 2024-02-19 RX ADMIN — BETAMETHASONE SODIUM PHOSPHATE AND BETAMETHASONE ACETATE 12 MG: 3; 3 INJECTION, SUSPENSION INTRA-ARTICULAR; INTRALESIONAL; INTRAMUSCULAR at 10:07

## 2024-02-19 NOTE — PROGRESS NOTES
History: Silvio is here for his shoulders.  He gets pain on both sides.  He states this is been going on intermittently over the last 10 years.  He did have a left shoulder injection many years ago which seem to help.  He is also diabetic.  Left is much worse than the right.    Past medical history: Multiple  Medications: Multiple  Allergies: No known drug allergies    Please refer to the intake H&P regarding the patient's review of systems, family history and social history as was done today    HEENT: Normal  Lungs: Clear to auscultation  Heart: RRR  Abdomen: Soft, nontender  Skin: clear  Extremity: He has full overhead motion of both shoulders.  He has 5 out of 5 strength in all groups tested.  There is pain on the left with stressing.  There is also significant pain with impingement movers on the left.  He has full passive motion at the side.  No numbness or tingling.  Slight neck soreness with extension and rotation but no radicular pain.  Contralateral exam is normal for strength, motion, stability and neurovascular assessment.    Radiographs: X-rays of both shoulders show minimal degenerative change with good alignment of the joints.  Pacemaker noted on the left.  Small calcifications noted toward the greater tuberosity insertion.    Assessment: Left greater than right shoulder impingement with mild calcific tendinitis, cannot rule out further internal derangement.    Plan: He has fairly classic impingement pain worse on the left than the right as well as a small calcification.  He did respond well to cortisone many years ago and would like to try it again.  He will monitor his sugars appropriately.  He is also willing to get into a gentle therapy program.  Will see him in 6 weeks for recheck.  If not improved we can consider further imaging.  L Inj/Asp: L subacromial bursa on 2/19/2024 10:07 AM  Indications: pain  Details: 22 G needle, posterior approach  Medications: 12 mg betamethasone acet,sod phos 6  mg/mL; 5 mL lidocaine 10 mg/mL (1 %)  Outcome: tolerated well, no immediate complications  Procedure, treatment alternatives, risks and benefits explained, specific risks discussed. Consent was given by the patient. Immediately prior to procedure a time out was called to verify the correct patient, procedure, equipment, support staff and site/side marked as required. Patient was prepped and draped in the usual sterile fashion.          All questions were answered today with the patient.    This note was generated with voice recognition software and may contain grammatical errors.

## 2024-02-22 ENCOUNTER — OFFICE VISIT (OUTPATIENT)
Dept: SLEEP MEDICINE | Facility: CLINIC | Age: 69
End: 2024-02-22
Payer: MEDICARE

## 2024-02-22 VITALS
RESPIRATION RATE: 20 BRPM | WEIGHT: 234.8 LBS | HEIGHT: 68 IN | OXYGEN SATURATION: 94 % | SYSTOLIC BLOOD PRESSURE: 136 MMHG | BODY MASS INDEX: 35.58 KG/M2 | DIASTOLIC BLOOD PRESSURE: 86 MMHG | HEART RATE: 71 BPM

## 2024-02-22 DIAGNOSIS — G47.33 OSA ON CPAP: ICD-10-CM

## 2024-02-22 DIAGNOSIS — G47.30 SLEEP APNEA, UNSPECIFIED TYPE: Primary | ICD-10-CM

## 2024-02-22 PROCEDURE — 3075F SYST BP GE 130 - 139MM HG: CPT | Performed by: GENERAL PRACTICE

## 2024-02-22 PROCEDURE — 3048F LDL-C <100 MG/DL: CPT | Performed by: GENERAL PRACTICE

## 2024-02-22 PROCEDURE — 3008F BODY MASS INDEX DOCD: CPT | Performed by: GENERAL PRACTICE

## 2024-02-22 PROCEDURE — 3079F DIAST BP 80-89 MM HG: CPT | Performed by: GENERAL PRACTICE

## 2024-02-22 PROCEDURE — 1126F AMNT PAIN NOTED NONE PRSNT: CPT | Performed by: GENERAL PRACTICE

## 2024-02-22 PROCEDURE — 1159F MED LIST DOCD IN RCRD: CPT | Performed by: GENERAL PRACTICE

## 2024-02-22 PROCEDURE — 1160F RVW MEDS BY RX/DR IN RCRD: CPT | Performed by: GENERAL PRACTICE

## 2024-02-22 PROCEDURE — 99204 OFFICE O/P NEW MOD 45 MIN: CPT | Performed by: GENERAL PRACTICE

## 2024-02-22 PROCEDURE — 4010F ACE/ARB THERAPY RXD/TAKEN: CPT | Performed by: GENERAL PRACTICE

## 2024-02-22 ASSESSMENT — SLEEP AND FATIGUE QUESTIONNAIRES
HOW LIKELY ARE YOU TO NOD OFF OR FALL ASLEEP WHILE SITTING AND READING: HIGH CHANCE OF DOZING
HOW LIKELY ARE YOU TO NOD OFF OR FALL ASLEEP WHILE SITTING QUIETLY AFTER LUNCH WITHOUT ALCOHOL: WOULD NEVER DOZE
HOW LIKELY ARE YOU TO NOD OFF OR FALL ASLEEP IN A CAR, WHILE STOPPED FOR A FEW MINUTES IN TRAFFIC: WOULD NEVER DOZE
HOW LIKELY ARE YOU TO NOD OFF OR FALL ASLEEP WHEN YOU ARE A PASSENGER IN A CAR FOR AN HOUR WITHOUT A BREAK: MODERATE CHANCE OF DOZING
ESS-CHAD TOTAL SCORE: 11
SLEEP_PROBLEM_NOTICEABLE_TO_OTHERS: A LITTLE
SATISFACTION_WITH_CURRENT_SLEEP_PATTERN: SATISFIED
SLEEP_PROBLEM_INTERFERES_DAILY_ACTIVITIES: SOMEWHAT
SITING INACTIVE IN A PUBLIC PLACE LIKE A CLASS ROOM OR A MOVIE THEATER: WOULD NEVER DOZE
HOW LIKELY ARE YOU TO NOD OFF OR FALL ASLEEP WHILE LYING DOWN TO REST IN THE AFTERNOON WHEN CIRCUMSTANCES PERMIT: HIGH CHANCE OF DOZING
HOW LIKELY ARE YOU TO NOD OFF OR FALL ASLEEP WHILE SITTING AND TALKING TO SOMEONE: WOULD NEVER DOZE
HOW LIKELY ARE YOU TO NOD OFF OR FALL ASLEEP WHILE WATCHING TV: HIGH CHANCE OF DOZING
DIFFICULTY_FALLING_ASLEEP: MILD
WORRIED_DISTRESSED_DUE_TO_SLEEP: A LITTLE

## 2024-02-22 ASSESSMENT — COLUMBIA-SUICIDE SEVERITY RATING SCALE - C-SSRS
1. IN THE PAST MONTH, HAVE YOU WISHED YOU WERE DEAD OR WISHED YOU COULD GO TO SLEEP AND NOT WAKE UP?: NO
2. HAVE YOU ACTUALLY HAD ANY THOUGHTS OF KILLING YOURSELF?: NO
6. HAVE YOU EVER DONE ANYTHING, STARTED TO DO ANYTHING, OR PREPARED TO DO ANYTHING TO END YOUR LIFE?: NO

## 2024-02-22 ASSESSMENT — ENCOUNTER SYMPTOMS
DEPRESSION: 0
OCCASIONAL FEELINGS OF UNSTEADINESS: 0
LOSS OF SENSATION IN FEET: 0
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
## 2024-03-01 ENCOUNTER — HOSPITAL ENCOUNTER (OUTPATIENT)
Dept: VASCULAR MEDICINE | Facility: CLINIC | Age: 69
Discharge: HOME | End: 2024-03-01
Payer: MEDICARE

## 2024-03-01 ENCOUNTER — HOSPITAL ENCOUNTER (OUTPATIENT)
Dept: RADIOLOGY | Facility: CLINIC | Age: 69
Discharge: HOME | End: 2024-03-01
Payer: MEDICARE

## 2024-03-01 DIAGNOSIS — Z13.6 SCREENING FOR AAA (ABDOMINAL AORTIC ANEURYSM): ICD-10-CM

## 2024-03-01 DIAGNOSIS — Z87.891 PERSONAL HISTORY OF TOBACCO USE, PRESENTING HAZARDS TO HEALTH: ICD-10-CM

## 2024-03-01 PROCEDURE — 76706 US ABDL AORTA SCREEN AAA: CPT | Performed by: INTERNAL MEDICINE

## 2024-03-01 PROCEDURE — 76706 US ABDL AORTA SCREEN AAA: CPT

## 2024-03-01 PROCEDURE — 71271 CT THORAX LUNG CANCER SCR C-: CPT

## 2024-03-04 DIAGNOSIS — R91.8 LUNG NODULES: Primary | ICD-10-CM

## 2024-03-04 NOTE — RESULT ENCOUNTER NOTE
Patient's CT and ultrasound are resulted.  I reordered a CT scan in 6 months.  He does have calcified coronary arteries.  He needs to follow back up with cardiology as scheduled.  Please review and let patient know the results and follow-up

## 2024-03-14 ENCOUNTER — DOCUMENTATION (OUTPATIENT)
Dept: PHYSICAL THERAPY | Facility: CLINIC | Age: 69
End: 2024-03-14
Payer: COMMERCIAL

## 2024-03-14 NOTE — PROGRESS NOTES
Physical Therapy    Discharge Summary    Name: Silvio Noel  MRN: [id number]  : 1955  Date: 24      Pt was seen for 5 visits of skilled PT between 23 and 24.    Pt's chart is over 30 days and per  policy pt to discharge.

## 2024-03-25 ENCOUNTER — APPOINTMENT (OUTPATIENT)
Dept: ORTHOPEDIC SURGERY | Facility: CLINIC | Age: 69
End: 2024-03-25
Payer: MEDICARE

## 2024-04-05 PROBLEM — R39.15 URGENCY OF URINATION: Status: ACTIVE | Noted: 2024-04-05

## 2024-04-05 PROBLEM — I25.2 PAST MYOCARDIAL INFARCTION: Status: ACTIVE | Noted: 2024-04-05

## 2024-04-05 PROBLEM — E66.812 CLASS 2 OBESITY WITH BODY MASS INDEX (BMI) OF 35.0 TO 35.9 IN ADULT: Status: ACTIVE | Noted: 2024-04-05

## 2024-04-05 PROBLEM — K64.8 INTERNAL HEMORRHOIDS WITH COMPLICATION: Status: ACTIVE | Noted: 2024-04-05

## 2024-04-05 PROBLEM — J20.9 ACUTE BRONCHITIS, UNSPECIFIED: Status: ACTIVE | Noted: 2024-04-05

## 2024-04-05 PROBLEM — H61.20 CERUMEN IMPACTION: Status: ACTIVE | Noted: 2024-04-05

## 2024-04-05 PROBLEM — H91.91 HEARING LOSS IN RIGHT EAR: Status: ACTIVE | Noted: 2024-04-05

## 2024-04-05 PROBLEM — E66.9 CLASS 2 OBESITY WITH BODY MASS INDEX (BMI) OF 35.0 TO 35.9 IN ADULT: Status: ACTIVE | Noted: 2024-04-05

## 2024-04-05 PROBLEM — R94.30 ABNORMAL RESULTS OF CARDIOVASCULAR FUNCTION STUDIES: Status: ACTIVE | Noted: 2024-04-05

## 2024-04-05 PROBLEM — L29.9 EAR ITCHING: Status: ACTIVE | Noted: 2024-04-05

## 2024-04-05 PROBLEM — L30.9 DERMATITIS OF EXTERNAL EAR: Status: ACTIVE | Noted: 2024-04-05

## 2024-04-05 PROBLEM — N40.1 BENIGN PROSTATIC HYPERPLASIA WITH WEAK URINARY STREAM: Status: ACTIVE | Noted: 2024-04-05

## 2024-04-05 PROBLEM — H91.90 DIFFICULTY HEARING: Status: ACTIVE | Noted: 2024-04-05

## 2024-04-05 PROBLEM — R07.9 ACUTE CHEST PAIN: Status: ACTIVE | Noted: 2024-04-05

## 2024-04-05 PROBLEM — R35.1 NOCTURIA: Status: ACTIVE | Noted: 2024-04-05

## 2024-04-05 PROBLEM — J34.2 DEVIATED SEPTUM: Status: ACTIVE | Noted: 2024-04-05

## 2024-04-05 PROBLEM — J32.9 SINUSITIS: Status: ACTIVE | Noted: 2024-04-05

## 2024-04-05 PROBLEM — R39.12 BENIGN PROSTATIC HYPERPLASIA WITH WEAK URINARY STREAM: Status: ACTIVE | Noted: 2024-04-05

## 2024-04-05 PROBLEM — E66.9 OBESE: Status: ACTIVE | Noted: 2024-04-05

## 2024-04-05 PROBLEM — I47.10 SUPRAVENTRICULAR TACHYCARDIA (CMS-HCC): Status: ACTIVE | Noted: 2024-04-05

## 2024-04-05 PROBLEM — M25.512 PAIN IN JOINT OF LEFT SHOULDER: Status: ACTIVE | Noted: 2024-04-05

## 2024-04-05 PROBLEM — R09.81 NASAL CONGESTION: Status: ACTIVE | Noted: 2024-04-05

## 2024-04-05 PROBLEM — L57.0 ACTINIC KERATOSIS: Status: ACTIVE | Noted: 2024-04-05

## 2024-04-05 PROBLEM — H90.3 ASNHL (ASYMMETRICAL SENSORINEURAL HEARING LOSS): Status: ACTIVE | Noted: 2024-04-05

## 2024-04-10 ENCOUNTER — ANESTHESIA EVENT (OUTPATIENT)
Dept: OPERATING ROOM | Facility: CLINIC | Age: 69
End: 2024-04-10

## 2024-04-10 RX ORDER — ONDANSETRON HYDROCHLORIDE 2 MG/ML
4 INJECTION, SOLUTION INTRAVENOUS ONCE AS NEEDED
OUTPATIENT
Start: 2024-04-10

## 2024-04-10 RX ORDER — ALBUTEROL SULFATE 0.83 MG/ML
2.5 SOLUTION RESPIRATORY (INHALATION) ONCE AS NEEDED
OUTPATIENT
Start: 2024-04-10

## 2024-04-10 RX ORDER — LIDOCAINE IN NACL,ISO-OSMOT/PF 30 MG/3 ML
0.1 SYRINGE (ML) INJECTION ONCE
OUTPATIENT
Start: 2024-04-10 | End: 2024-04-10

## 2024-04-10 RX ORDER — SODIUM CHLORIDE, SODIUM LACTATE, POTASSIUM CHLORIDE, CALCIUM CHLORIDE 600; 310; 30; 20 MG/100ML; MG/100ML; MG/100ML; MG/100ML
100 INJECTION, SOLUTION INTRAVENOUS CONTINUOUS
OUTPATIENT
Start: 2024-04-10

## 2024-04-11 ENCOUNTER — ANESTHESIA (OUTPATIENT)
Dept: OPERATING ROOM | Facility: CLINIC | Age: 69
End: 2024-04-11

## 2024-04-11 ENCOUNTER — HOSPITAL ENCOUNTER (OUTPATIENT)
Dept: OPERATING ROOM | Facility: CLINIC | Age: 69
Discharge: HOME | End: 2024-04-11
Payer: MEDICARE

## 2024-04-11 ENCOUNTER — ANESTHESIA EVENT (OUTPATIENT)
Dept: GASTROENTEROLOGY | Facility: HOSPITAL | Age: 69
End: 2024-04-11
Payer: MEDICARE

## 2024-04-11 ENCOUNTER — HOSPITAL ENCOUNTER (OUTPATIENT)
Dept: GASTROENTEROLOGY | Facility: HOSPITAL | Age: 69
Discharge: HOME | End: 2024-04-11
Payer: MEDICARE

## 2024-04-11 ENCOUNTER — ANESTHESIA (OUTPATIENT)
Dept: GASTROENTEROLOGY | Facility: HOSPITAL | Age: 69
End: 2024-04-11
Payer: MEDICARE

## 2024-04-11 VITALS
BODY MASS INDEX: 35.38 KG/M2 | DIASTOLIC BLOOD PRESSURE: 88 MMHG | OXYGEN SATURATION: 95 % | RESPIRATION RATE: 18 BRPM | HEIGHT: 68 IN | SYSTOLIC BLOOD PRESSURE: 138 MMHG | WEIGHT: 233.47 LBS | HEART RATE: 66 BPM | TEMPERATURE: 97.5 F

## 2024-04-11 VITALS
TEMPERATURE: 96.8 F | HEIGHT: 68 IN | WEIGHT: 233 LBS | DIASTOLIC BLOOD PRESSURE: 86 MMHG | RESPIRATION RATE: 18 BRPM | OXYGEN SATURATION: 96 % | BODY MASS INDEX: 35.31 KG/M2 | SYSTOLIC BLOOD PRESSURE: 153 MMHG | HEART RATE: 60 BPM

## 2024-04-11 DIAGNOSIS — Z95.810 ICD (IMPLANTABLE CARDIOVERTER-DEFIBRILLATOR) IN PLACE: ICD-10-CM

## 2024-04-11 DIAGNOSIS — Z91.89 HIGH RISK FOR COLON CANCER: Primary | ICD-10-CM

## 2024-04-11 DIAGNOSIS — Z95.810 ICD (IMPLANTABLE CARDIOVERTER-DEFIBRILLATOR) IN PLACE: Primary | ICD-10-CM

## 2024-04-11 DIAGNOSIS — Z12.12 SCREENING FOR COLORECTAL CANCER: ICD-10-CM

## 2024-04-11 DIAGNOSIS — Z12.11 SCREEN FOR COLON CANCER: ICD-10-CM

## 2024-04-11 DIAGNOSIS — Z12.11 SCREENING FOR COLORECTAL CANCER: ICD-10-CM

## 2024-04-11 LAB — GLUCOSE BLD MANUAL STRIP-MCNC: 118 MG/DL (ref 74–99)

## 2024-04-11 PROCEDURE — 88305 TISSUE EXAM BY PATHOLOGIST: CPT | Performed by: STUDENT IN AN ORGANIZED HEALTH CARE EDUCATION/TRAINING PROGRAM

## 2024-04-11 PROCEDURE — 2500000005 HC RX 250 GENERAL PHARMACY W/O HCPCS: Performed by: NURSE ANESTHETIST, CERTIFIED REGISTERED

## 2024-04-11 PROCEDURE — 45380 COLONOSCOPY AND BIOPSY: CPT | Performed by: INTERNAL MEDICINE

## 2024-04-11 PROCEDURE — 7100000010 HC PHASE TWO TIME - EACH INCREMENTAL 1 MINUTE

## 2024-04-11 PROCEDURE — 88305 TISSUE EXAM BY PATHOLOGIST: CPT | Mod: TC,PARLAB | Performed by: INTERNAL MEDICINE

## 2024-04-11 PROCEDURE — 2500000004 HC RX 250 GENERAL PHARMACY W/ HCPCS (ALT 636 FOR OP/ED): Performed by: NURSE ANESTHETIST, CERTIFIED REGISTERED

## 2024-04-11 PROCEDURE — A45380 PR COLONOSCOPY,BIOPSY: Performed by: NURSE ANESTHETIST, CERTIFIED REGISTERED

## 2024-04-11 PROCEDURE — 7100000009 HC PHASE TWO TIME - INITIAL BASE CHARGE

## 2024-04-11 PROCEDURE — A45380 PR COLONOSCOPY,BIOPSY: Performed by: ANESTHESIOLOGY

## 2024-04-11 PROCEDURE — 3700000002 HC GENERAL ANESTHESIA TIME - EACH INCREMENTAL 1 MINUTE

## 2024-04-11 PROCEDURE — 82947 ASSAY GLUCOSE BLOOD QUANT: CPT

## 2024-04-11 PROCEDURE — 3700000001 HC GENERAL ANESTHESIA TIME - INITIAL BASE CHARGE

## 2024-04-11 RX ORDER — PROPOFOL 10 MG/ML
INJECTION, EMULSION INTRAVENOUS CONTINUOUS PRN
Status: DISCONTINUED | OUTPATIENT
Start: 2024-04-11 | End: 2024-04-11

## 2024-04-11 RX ORDER — SODIUM CHLORIDE, SODIUM LACTATE, POTASSIUM CHLORIDE, CALCIUM CHLORIDE 600; 310; 30; 20 MG/100ML; MG/100ML; MG/100ML; MG/100ML
INJECTION, SOLUTION INTRAVENOUS CONTINUOUS PRN
Status: DISCONTINUED | OUTPATIENT
Start: 2024-04-11 | End: 2024-04-11

## 2024-04-11 RX ORDER — LIDOCAINE HCL/PF 100 MG/5ML
SYRINGE (ML) INTRAVENOUS AS NEEDED
Status: DISCONTINUED | OUTPATIENT
Start: 2024-04-11 | End: 2024-04-11

## 2024-04-11 RX ORDER — PROPOFOL 10 MG/ML
INJECTION, EMULSION INTRAVENOUS AS NEEDED
Status: DISCONTINUED | OUTPATIENT
Start: 2024-04-11 | End: 2024-04-11

## 2024-04-11 RX ADMIN — PROPOFOL 50 MG: 10 INJECTION, EMULSION INTRAVENOUS at 13:05

## 2024-04-11 RX ADMIN — PROPOFOL 100 MCG/KG/MIN: 10 INJECTION, EMULSION INTRAVENOUS at 13:05

## 2024-04-11 RX ADMIN — SODIUM CHLORIDE, SODIUM LACTATE, POTASSIUM CHLORIDE, AND CALCIUM CHLORIDE: 600; 310; 30; 20 INJECTION, SOLUTION INTRAVENOUS at 12:55

## 2024-04-11 RX ADMIN — PROPOFOL 50 MG: 10 INJECTION, EMULSION INTRAVENOUS at 13:07

## 2024-04-11 RX ADMIN — LIDOCAINE HYDROCHLORIDE 80 MG: 20 INJECTION, SOLUTION INTRAVENOUS at 13:05

## 2024-04-11 SDOH — HEALTH STABILITY: MENTAL HEALTH: CURRENT SMOKER: 0

## 2024-04-11 ASSESSMENT — PAIN - FUNCTIONAL ASSESSMENT
PAIN_FUNCTIONAL_ASSESSMENT: 0-10
PAIN_FUNCTIONAL_ASSESSMENT: 0-10

## 2024-04-11 ASSESSMENT — COLUMBIA-SUICIDE SEVERITY RATING SCALE - C-SSRS
6. HAVE YOU EVER DONE ANYTHING, STARTED TO DO ANYTHING, OR PREPARED TO DO ANYTHING TO END YOUR LIFE?: NO
1. IN THE PAST MONTH, HAVE YOU WISHED YOU WERE DEAD OR WISHED YOU COULD GO TO SLEEP AND NOT WAKE UP?: NO
2. HAVE YOU ACTUALLY HAD ANY THOUGHTS OF KILLING YOURSELF?: NO

## 2024-04-11 ASSESSMENT — PAIN SCALES - GENERAL
PAINLEVEL_OUTOF10: 0 - NO PAIN
PAIN_LEVEL: 0

## 2024-04-11 NOTE — ANESTHESIA PREPROCEDURE EVALUATION
Patient: Silvio Noel    Procedure Information       Date/Time: 04/11/24 1200    Scheduled providers: Blake Gonzalez MD; Charanjit Garcia MD    Procedure: COLONOSCOPY    Location: [facility]            Relevant Problems   Cardiac   (+) Acute chest pain   (+) CAD (coronary artery disease)   (+) ICD (implantable cardioverter-defibrillator) in place   (+) Mixed hyperlipidemia   (+) Myocardial infarction (CMS/HCC)   (+) Supraventricular tachycardia (CMS/HCC)   (+) Ventricular tachycardia (paroxysmal) (CMS/HCC)      Pulmonary   (+) MARY on CPAP      Neuro   (+) Anxiety   (+) Depression with anxiety      GI   (+) GERD (gastroesophageal reflux disease)      /Renal   (+) BPH (benign prostatic hyperplasia)   (+) Benign prostatic hyperplasia with weak urinary stream      Liver   (+) Fatty liver      Endocrine   (+) Class 2 obesity with body mass index (BMI) of 35.0 to 35.9 in adult   (+) Hypothyroidism, adult   (+) Obese   (+) Type 2 diabetes mellitus (CMS/HCC)   (+) Type 2 diabetes mellitus with other circulatory complication, without long-term current use of insulin (CMS/HCC)      HEENT   (+) ASNHL (asymmetrical sensorineural hearing loss)   (+) Hearing loss in right ear   (+) Mixed conductive and sensorineural hearing loss of left ear with restricted hearing of right ear   (+) Sinusitis       Clinical information reviewed:    Allergies  Meds  Problems              NPO Detail:  NPO/Void Status  Carbohydrate Drink Given Prior to Surgery? : N  Date of Last Liquid: 04/11/24  Time of Last Liquid: 0600  Date of Last Solid: 04/10/24  Time of Last Solid: 0900  Last Intake Type: Clear fluids  Time of Last Void: 0631         Physical Exam    Airway  Mallampati: IV  TM distance: <3 FB  Neck ROM: full     Cardiovascular - normal exam  Rhythm: regular  Rate: normal     Dental - normal exam     Pulmonary   Breath sounds clear to auscultation  (+) decreased breath sounds     Abdominal   (+) obese             Anesthesia  Plan    History of general anesthesia?: yes  History of complications of general anesthesia?: no    ASA 3     MAC     The patient is not a current smoker.  Education provided regarding risk of obstructive sleep apnea.  intravenous induction   Anesthetic plan and risks discussed with patient.  Use of blood products discussed with patient who consented to blood products.    Plan discussed with CRNA.

## 2024-04-11 NOTE — LETTER
Miralax Split Dose Bowel Prep  For this exam, you must change your diet for a few days and take the bowel prep medication your doctor orders. The bowel prep medication is a laxative that cleans stool(poop) out of your colon. This helps the doctor see the area clearly. If there is stool in your colon, your exam may need to be cancelled or redone sooner than it would be if there was a good or excellent cleansing.  Most bowel preps are split into 2 parts. You take one part of your bowel prep, wait for at least few hours and then take the second part of your bowel prep. Read these instructions carefully.  What to expect  The bowel prep medication causes frequent and watery poop (diarrhea), so stay near a bathroom after you take it. Sometimes it takes a few hours to start working. Its normal to have some mild belly cramps and bloating after drinking the prep. If your bottom gets sore from the frequent movements, you can apply Bernard cream or Vaseline to your bottom.  What you will need  Bowel prep medication- pick this up from your drug store a few days before the exam. Most patients take 2 doses split atpart to give their bowel a rest and improve the cleansing effect.   Clear liquids- clear liquids are those you can see through and include:  water, clear pop like ginger ale and clear fruit juices without pulp such as apple, lemonade or white grape juice  Clear broth- beef, chicken or vegetable  Jello, popsicles and sports drink like Gatorade- no red or dark colors  Plain coffee or tea- no milk or creamer    Instructions on the Miralax bottle are not the same as what is listed here. For your bowel prep, follow the steps listed in this section.  How to do a Split-Dose Bowel Prep with Miralax  2-3 days before your exam   the bowel prep items at the grocery or drugstore:  1 bottle of Miralax- 8.3 ounces (238 grams)  1 box of Dulcolax laxative tablets or generic bisacodyl- 5 mg. Each  64 ounces of gatorade,  Gatorade G2 or Propel Zero- do not buy red or purple. If you have diabetes, Gatorade G2 or Propel Zero are preferred.  2. Buy any clear liquids you want for your prep day.  The day before your exam  In the morning you may eat a banana and white toast. After that, you can only have clear liquids and 2 Saltine crackers to help with nausea- you cannot eat any other solid food. Do not drink alcohol.  Between 11:00 am and 5:00 pm  Drink 8 ounces of clear liquids each hour  At 3:00 pm  Take 2 dulcolax (bisacodyl) tablets with 8 ounces of clear liquids.  At 5:00 pm  Mix the whole bottle of Miralax powder in 64 ounces of Gatorade, Gatorade G@ or Propel Zero. Shake until the Miralax is dissolved.  Drink an 8 ounce of the mixture every 10-15 minutes for a total of 4 glasses (32 ounces).  Put the rest in the refrigerator. It should be 4 cups. You will drink it the next day for the second half of your prep.  Take 2 Dulcolax (bisacodyl) tablets with the las glass of the mixture or water.  The rest of the night  Stay near the toilet. This medication helps clean your bowel for the exam and causes bowel movements that can be sudden.  Drink 8 ounces of clear liquids each hour you are awake.  The day of your exam  Take your heart and blood pressure medications with water.  5 hours before your arrival time  Drink the rest of the bowel prep mixture.  Stop drinking clear liquids 3 hours before your arrival time.  Don't drink alcohol the whole day.

## 2024-04-11 NOTE — PERIOPERATIVE NURSING NOTE
Patient's pre op documentation incomplete d/t IACD interrogation having not been rec'd from cardiology. Working on contacting cardiology and/or device clinic at this time. Patient and family aware.

## 2024-04-11 NOTE — H&P
Procedure H&P    Patient Profile-Procedures  Name Silvio Noel  Date of Birth 1955  MRN [id number]  Address   [id number] [place] [address] [address]  [facility] [id number]  [facility] [id number]    Primary Phone Number [phone]  Secondary Phone Number    Jose Lewis    Procedure(s):  Procedures: Colonoscopy  Primary contact name and number   Extended Emergency Contact Information  Primary Emergency Contact: Marleen Noel  Home Phone: [phone]  Work Phone: [phone]  Relation: Spouse    General Health  Weight   Vitals:    04/11/24 1044   Weight: 106 kg (233 lb)     BMI Body mass index is 35.43 kg/m².    Allergies  Allergies   Allergen Reactions    Codeine Unknown     Blood pressure goes down    Hydrocodone-Acetaminophen Unknown     Blood pressure goes up    Lisinopril Cough       Past Medical History   Past Medical History:   Diagnosis Date    Atherosclerotic heart disease of native coronary artery without angina pectoris 12/02/2019    Coronary artery disease without angina pectoris, unspecified vessel or lesion type, unspecified whether native or transplanted heart    Diverticulitis, colon 10/31/2023       Provider assessment  Diagnosis: Colon Cancer Screening/Surveillance   Medication Reviewed - yes stopped plavix 2 days ago   Prior to Admission medications    Medication Sig Start Date End Date Taking? Authorizing Provider   amLODIPine (Norvasc) 5 mg tablet Take 1 tablet (5 mg) by mouth once daily. 1/22/24 4/11/24 Yes Bert Smith, DO   ascorbic acid (VITAMIN C ORAL) Take by mouth. (Vitamin C Oral Tablet Chewable)   Yes Historical Provider, MD   aspirin 81 mg EC tablet Take 1 tablet (81 mg) by mouth once daily.   Yes Historical Provider, MD   atenolol (Tenormin) 50 mg tablet Take 1 tablet (50 mg) by mouth 2 times a day. 1/22/24 4/21/24 Yes Bert Smith, DO   clopidogrel (Plavix) 75 mg tablet Take 1 tablet (75 mg) by mouth once daily. 9/11/20  Yes Historical Provider, MD    ergocalciferol (Vitamin D-2) 1.25 MG (99616 UT) capsule Take 1 capsule (1,250 mcg) by mouth 1 (one) time per week. 12/8/23  Yes Luli Dickens MD   fenofibrate (Tricor) 145 mg tablet TAKE 1 TABLET DAILY WITH   FOOD 12/8/23  Yes Savita Newman MD   FLUoxetine (PROzac) 20 mg tablet Take 1 tablet (20 mg) by mouth once daily. 10/31/23  Yes Alek Clemens DO   fluticasone (Flonase) 50 mcg/actuation nasal spray Administer 1 spray into affected nostril(s) once daily. 3/2/20  Yes Historical Provider, MD   icosapent ethyL (Vascepa) 1 gram capsule Take 2 capsules (2 g) by mouth 2 times a day. 3/2/20  Yes Historical Provider, MD   insulin glargine (Basaglar KwikPen U-100 Insulin) 100 unit/mL (3 mL) pen Inject 20 Units under the skin once daily at bedtime. Take as directed per insulin instructions. 12/18/23  Yes Luli Dickens MD   levothyroxine (Synthroid, Levoxyl) 175 mcg tablet TAKE 1 TABLET DAILY 12/7/23  Yes Luli Dickens MD   losartan (Cozaar) 50 mg tablet Take 1 tablet (50 mg) by mouth 2 times a day. 7/17/18  Yes Historical Provider, MD   magnesium oxide (Mag-Ox) 400 mg (241.3 mg magnesium) tablet Take 1 tablet (400 mg) by mouth once daily.   Yes Historical Provider, MD   omeprazole (PriLOSEC) 40 mg DR capsule TAKE 1 CAPSULE ONCE DAILY 12/7/23  Yes Jose Jernigan DO   pioglitazone (Actos) 45 mg tablet TAKE 1 TABLET DAILY 12/7/23  Yes Luli Dickens MD   rosuvastatin (Crestor) 40 mg tablet TAKE 1 TABLET DAILY 12/7/23  Yes Luli Dickens MD   Synjardy 12.5-1,000 mg TAKE 2 TABLETS DAILY 12/7/23  Yes Luli Dickens MD   Trulicity 3 mg/0.5 mL pen injector Inject under the skin. 3/2/20  Yes Historical Provider, MD   VITAMIN B COMPLEX ORAL Take 1 tablet by mouth once daily.   Yes Historical Provider, MD   ZINC ORAL Take 100 mg by mouth. (Zinc 100 MG Oral Tablet)   Yes Historical Provider, MD   BENZONATATE ORAL Take 100 mg by mouth. (Benzonatate 100 MG Oral Capsule);  1 CAPSULE 2-3 TIMES DAILY as DIRECTED    Historical  "Provider, MD   FreeStyle Lite Strips strip USE TWICE DAILY 4/9/20   Historical Provider, MD   MELOXICAM ORAL Take 15 mg by mouth. (Meloxicam 15 MG Oral Tablet);  TAKE 1 TABLET DAILY as NEEDED    Historical Provider, MD   nitroglycerin (Nitrostat) 0.4 mg SL tablet Place 1 tablet (0.4 mg) under the tongue every 5 minutes if needed.    Historical Provider, MD   pen needle, diabetic 32 gauge x 5/32\" needle 1 Needle once daily. 12/26/23   Jose Jernigan DO   tadalafil (Cialis) 5 mg tablet Take 1 tablet (5 mg) by mouth once daily. 4/27/22   Historical Provider, MD       Physical Exam  Vitals:    04/11/24 1044   BP: 119/72   Pulse: 62   Resp: 18   Temp: 36.2 °C (97.2 °F)   SpO2: 96%        General: A&Ox3, NAD.  HEENT: AT/NC.   CV: RRR. No murmur.  Resp: CTA bilaterally. No wheezing, rhonchi or rales.   GI: Soft, NT/ND. BSx4.  Extrem: No edema. Pulses intact.  Neuro: No focal deficits.   Psych: Normal mood and affect.      Procedure Plan - pre-procedural (re)assesment completed by physician:  discharge/transfer patient when discharge criteria met    Blake Gonzalez MD  4/11/2024 1:19 PM      "

## 2024-04-11 NOTE — DISCHARGE INSTRUCTIONS
Patient Instructions after a Colonoscopy      The anesthetics, sedatives or narcotics which were given to you today will be acting in your body for the next 24 hours, so you might feel a little sleepy or groggy.  This feeling should slowly wear off. Carefully read and follow the instructions.     You received sedation today:  - Do not drive or operate any machinery or power tools of any kind.   - No alcoholic beverages today, not even beer or wine.  - Do not make any important decisions or sign any legal documents.  - No over the counter medications that contain alcohol or that may cause drowsiness.  - Do not make any important decisions or sign any legal documents.    While it is common to experience mild to moderate abdominal distention, gas, or belching after your procedure, if any of these symptoms occur following discharge from the GI Lab or within one week of having your procedure, call the [facility] to be advised whether a visit to your nearest Urgent Care or Emergency Department is indicated.  Take this paper with you if you go.     - If you develop an allergic reaction to the medications that were given during your procedure such as difficulty breathing, rash, hives, severe nausea, vomiting or lightheadedness.  - If you experience chest pain, shortness of breath, severe abdominal pain, fevers and chills.  -If you develop signs and symptoms of bleeding such as blood in your spit, if your stools turn black, tarry, or bloody  - If you have not urinated within 8 hours following your procedure.  - If your IV site becomes painful, red, inflamed, or looks infected.    If you received a biopsy/polypectomy/sphincterotomy the following instructions apply below:    __ Do not use Aspirin containing products, non-steroidal medications or anti-coagulants for one week following your procedure. (Examples of these types of medications are: Advil, Arthrotec, Aleve, Coumadin, Ecotrin, Heparin, Ibuprofen,  Indocin, Motrin, Naprosyn, Nuprin, Plavix, Vioxx, and Voltarin, or their generic forms.  This list is not all-inclusive.  Check with your physician or pharmacist before resuming medications.)   __ Eat a soft diet today.  Avoid foods that are poorly digested for the next 24 hours.  These foods would include: nuts, beans, lettuce, red meats, and fried foods. Start with liquids and advance your diet as tolerated, gradually work up to eating solids.   __ Do not have a Barium Study or Enema for one week.    Your physician recommends the additional following instructions:    -You have a contact number available for emergencies. The signs and symptoms of potential delayed complications were discussed with you. You may return to normal activities tomorrow.  -Resume your previous diet.  -Continue your present medications.   -We are waiting for your pathology results.  -Your physician has recommended a repeat colonoscopy (date to be determined after pending pathology results are reviewed) for surveillance based on pathology results.  -The findings and recommendations have been discussed with you.  -The findings and recommendations were discussed with your family.  - Please see Medication Reconciliation Form for new medication/medications prescribed.       If you experience any problems or have any questions following discharge from the GI Lab, please call: [phone]

## 2024-04-11 NOTE — ANESTHESIA POSTPROCEDURE EVALUATION
Patient: Silvio Noel    Procedure Summary       Date: 04/11/24 Room / Location: [facility]    Anesthesia Start: 1255 Anesthesia Stop: 1324    Procedure: COLONOSCOPY Diagnosis: High risk for colon cancer    Scheduled Providers: Blake Gonzalez MD; Charanjit Garcia MD Responsible Provider: Franko Funez MD    Anesthesia Type: MAC ASA Status: 3            Anesthesia Type: MAC    Vitals Value Taken Time   /72 04/11/24 1334   Temp 36.2 04/11/24 1334   Pulse 62 04/11/24 1334   Resp 18 04/11/24 1334   SpO2 96 04/11/24 1334       Anesthesia Post Evaluation    Patient location during evaluation: PACU  Patient participation: waiting for patient participation  Level of consciousness: awake  Pain score: 0  Pain management: adequate  Airway patency: patent  Cardiovascular status: acceptable and hemodynamically stable  Respiratory status: face mask  Hydration status: acceptable  Postoperative Nausea and Vomiting: none        No notable events documented.

## 2024-04-17 LAB
LABORATORY COMMENT REPORT: NORMAL
PATH REPORT.COMMENTS IMP SPEC: NORMAL
PATH REPORT.FINAL DX SPEC: NORMAL
PATH REPORT.GROSS SPEC: NORMAL
PATH REPORT.RELEVANT HX SPEC: NORMAL
PATH REPORT.TOTAL CANCER: NORMAL

## 2024-05-01 DIAGNOSIS — I25.10 CORONARY ARTERY DISEASE INVOLVING NATIVE HEART, UNSPECIFIED VESSEL OR LESION TYPE, UNSPECIFIED WHETHER ANGINA PRESENT: ICD-10-CM

## 2024-05-02 RX ORDER — LOSARTAN POTASSIUM 50 MG/1
50 TABLET ORAL 2 TIMES DAILY
Qty: 180 TABLET | Refills: 3 | Status: SHIPPED | OUTPATIENT
Start: 2024-05-02 | End: 2025-05-02

## 2024-05-03 ENCOUNTER — LAB (OUTPATIENT)
Dept: LAB | Facility: LAB | Age: 69
End: 2024-05-03
Payer: MEDICARE

## 2024-05-03 ENCOUNTER — OFFICE VISIT (OUTPATIENT)
Dept: ENDOCRINOLOGY | Facility: CLINIC | Age: 69
End: 2024-05-03
Payer: MEDICARE

## 2024-05-03 VITALS
WEIGHT: 238 LBS | SYSTOLIC BLOOD PRESSURE: 133 MMHG | DIASTOLIC BLOOD PRESSURE: 84 MMHG | BODY MASS INDEX: 36.07 KG/M2 | HEIGHT: 68 IN

## 2024-05-03 DIAGNOSIS — E83.52 HYPERCALCEMIA: ICD-10-CM

## 2024-05-03 DIAGNOSIS — E66.01 CLASS 2 SEVERE OBESITY DUE TO EXCESS CALORIES WITH SERIOUS COMORBIDITY AND BODY MASS INDEX (BMI) OF 35.0 TO 35.9 IN ADULT (MULTI): ICD-10-CM

## 2024-05-03 DIAGNOSIS — E11.59 TYPE 2 DIABETES MELLITUS WITH OTHER CIRCULATORY COMPLICATION, WITHOUT LONG-TERM CURRENT USE OF INSULIN (MULTI): ICD-10-CM

## 2024-05-03 DIAGNOSIS — E03.9 HYPOTHYROIDISM, ADULT: Primary | ICD-10-CM

## 2024-05-03 DIAGNOSIS — F17.200 SMOKING: ICD-10-CM

## 2024-05-03 LAB
25(OH)D3 SERPL-MCNC: 37 NG/ML (ref 30–100)
ALBUMIN SERPL BCP-MCNC: 4.5 G/DL (ref 3.4–5)
ALP SERPL-CCNC: 38 U/L (ref 33–136)
ALT SERPL W P-5'-P-CCNC: 14 U/L (ref 10–52)
ANION GAP SERPL CALC-SCNC: 12 MMOL/L (ref 10–20)
AST SERPL W P-5'-P-CCNC: 19 U/L (ref 9–39)
BILIRUB SERPL-MCNC: 0.5 MG/DL (ref 0–1.2)
BUN SERPL-MCNC: 17 MG/DL (ref 6–23)
CALCIUM SERPL-MCNC: 10.4 MG/DL (ref 8.6–10.3)
CHLORIDE SERPL-SCNC: 105 MMOL/L (ref 98–107)
CHOLEST SERPL-MCNC: 103 MG/DL (ref 0–199)
CHOLESTEROL/HDL RATIO: 4.3
CO2 SERPL-SCNC: 24 MMOL/L (ref 21–32)
CREAT SERPL-MCNC: 1.18 MG/DL (ref 0.5–1.3)
CREAT UR-MCNC: 90.5 MG/DL (ref 20–370)
EGFRCR SERPLBLD CKD-EPI 2021: 68 ML/MIN/1.73M*2
GLUCOSE SERPL-MCNC: 187 MG/DL (ref 74–99)
HDLC SERPL-MCNC: 23.7 MG/DL
LDLC SERPL CALC-MCNC: 11 MG/DL
MICROALBUMIN UR-MCNC: 73 MG/L
MICROALBUMIN/CREAT UR: 80.7 UG/MG CREAT
NON HDL CHOLESTEROL: 79 MG/DL (ref 0–149)
POC FINGERSTICK BLOOD GLUCOSE: 159 MG/DL (ref 70–100)
POC HEMOGLOBIN A1C: 6.5 % (ref 4.2–6.5)
POTASSIUM SERPL-SCNC: 4 MMOL/L (ref 3.5–5.3)
PROT SERPL-MCNC: 6.8 G/DL (ref 6.4–8.2)
SODIUM SERPL-SCNC: 137 MMOL/L (ref 136–145)
TRIGL SERPL-MCNC: 344 MG/DL (ref 0–149)
TSH SERPL-ACNC: 0.48 MIU/L (ref 0.44–3.98)
VLDL: 69 MG/DL (ref 0–40)

## 2024-05-03 PROCEDURE — 82306 VITAMIN D 25 HYDROXY: CPT

## 2024-05-03 PROCEDURE — 82043 UR ALBUMIN QUANTITATIVE: CPT

## 2024-05-03 PROCEDURE — 80053 COMPREHEN METABOLIC PANEL: CPT

## 2024-05-03 PROCEDURE — 84443 ASSAY THYROID STIM HORMONE: CPT

## 2024-05-03 PROCEDURE — 82652 VIT D 1 25-DIHYDROXY: CPT

## 2024-05-03 PROCEDURE — 82330 ASSAY OF CALCIUM: CPT

## 2024-05-03 PROCEDURE — 36415 COLL VENOUS BLD VENIPUNCTURE: CPT

## 2024-05-03 PROCEDURE — 82397 CHEMILUMINESCENT ASSAY: CPT

## 2024-05-03 PROCEDURE — 82570 ASSAY OF URINE CREATININE: CPT

## 2024-05-03 PROCEDURE — 80061 LIPID PANEL: CPT

## 2024-05-03 PROCEDURE — 83970 ASSAY OF PARATHORMONE: CPT

## 2024-05-03 ASSESSMENT — ENCOUNTER SYMPTOMS
PHOTOPHOBIA: 0
SLEEP DISTURBANCE: 0
ABDOMINAL DISTENTION: 0
NERVOUS/ANXIOUS: 0
DYSURIA: 0
VOMITING: 0
AGITATION: 0
CONSTITUTIONAL NEGATIVE: 1
TREMORS: 0
ABDOMINAL PAIN: 0
SHORTNESS OF BREATH: 0
NAUSEA: 0
DIARRHEA: 0
CHEST TIGHTNESS: 0
PALPITATIONS: 0
FREQUENCY: 0
TROUBLE SWALLOWING: 0
LIGHT-HEADEDNESS: 0
HEADACHES: 0
SORE THROAT: 0
VOICE CHANGE: 0
CONSTIPATION: 0
ARTHRALGIAS: 0
EYE ITCHING: 0

## 2024-05-03 NOTE — PROGRESS NOTES
Subjective   Patient ID: Silvio Noel is a 68 y.o. male who presents for Diabetes (Dx DM: 2013/PCP: [facility]/Podiatry: does not see one /Eye exam: yearly, 4/2024 /Patient testing glucose once daily. Did not bring meter./11/15/2023 7.5%) and Hypothyroidism (Current regimen: 175- 1 tab mon- fri 1/2 tab sat and none on sun /Takes correctly most of the time ).  Lab Results   Component Value Date    HGBA1C 6.5 05/03/2024      Lab Results   Component Value Date    TSH 0.51 01/30/2024        HPI  See assessment and plan  Review of Systems   Constitutional: Negative.    HENT:  Negative for sore throat, trouble swallowing and voice change.    Eyes:  Negative for photophobia, itching and visual disturbance.   Respiratory:  Negative for chest tightness and shortness of breath.    Cardiovascular:  Negative for chest pain and palpitations.   Gastrointestinal:  Negative for abdominal distention, abdominal pain, constipation, diarrhea, nausea and vomiting.   Endocrine: Negative for cold intolerance, heat intolerance and polyuria.   Genitourinary:  Negative for dysuria and frequency.   Musculoskeletal:  Negative for arthralgias.   Skin:  Negative for pallor.   Allergic/Immunologic: Negative for environmental allergies.   Neurological:  Negative for tremors, light-headedness and headaches.   Psychiatric/Behavioral:  Negative for agitation and sleep disturbance. The patient is not nervous/anxious.        Objective   Physical Exam  Constitutional:       Appearance: Normal appearance.   HENT:      Head: Normocephalic.      Nose: Nose normal.      Mouth/Throat:      Mouth: Mucous membranes are moist.   Eyes:      Extraocular Movements: Extraocular movements intact.   Cardiovascular:      Rate and Rhythm: Normal rate.   Pulmonary:      Effort: Pulmonary effort is normal. No respiratory distress.   Abdominal:      General: There is no distension.   Musculoskeletal:         General: Normal range of motion.      Cervical back: Normal range  "of motion and neck supple.   Skin:     General: Skin is warm and dry.   Neurological:      Mental Status: He is alert and oriented to person, place, and time.   Psychiatric:         Mood and Affect: Mood normal.       Visit Vitals  /84   Ht 1.727 m (5' 8\")   Wt 108 kg (238 lb)   BMI 36.19 kg/m²   Smoking Status Every Day   BSA 2.28 m²        Assessment/Plan   Diagnoses and all orders for this visit:  Hypothyroidism, adult  Type 2 diabetes mellitus with other circulatory complication, without long-term current use of insulin (Multi)  -     POCT glucose manually resulted  -     POCT glycosylated hemoglobin (Hb A1C) manually resulted  -     Comprehensive metabolic panel; Future  -     Albumin , Urine (SPOT); Future  -     Calcium, ionized; Future  Hypercalcemia  -     PTH, intact; Future  -     Comprehensive metabolic panel; Future  -     Vitamin D 25-Hydroxy,Total (for eval of Vitamin D levels); Future  -     Albumin , Urine (SPOT); Future  -     PTH-Related Peptide; Future  -     Vitamin D 1,25 Dihydroxy (for eval of hypercalcemia); Future  -     TSH with reflex to Free T4 if abnormal; Future  Smoking  -     Referral to Otolaryngology (Head and Neck); Future            68  year old man with type II DM since 2013.     Current regimen :   Synjardy 12.5/1000mg 2 tabs daily  Trulicity 3 mg weekly  pioglitazone 45mg daily,    Restarted taking basaglar 0-0-0-20 ( started 2023)      he is complaint with trulicity , no SE   Denies any side effects with Synjardy    He has 3-month supply of Synjardy and Trulicity after that his insurance is not going to cover these 2 medications    Checking BG once morning -120s ,   denies any hypoglycemia symptoms      stopped regular soda in past.     HgA1C at goal   Interval history: Planning snf August 2, 2024    Plan:   - insurance changing to medicare soon, he is unsure about coverage of his meds.   CONTINUE SYNJARDY/ TRULICITY / PIOGLITAZONE / BASAGLAR 20 UNITS AT " NIGHT    NO POP       IF SYNJARDY AND TRULICITY NOT COVERED THEN WE CHANGE TO METFORMIN 1000MG TWICE A DAY WITH GOOD AND GLIPIZIDE 10 MG TWICE A DAY BEFORE EATING. NO GLPIZIDE IF NOT EATING , IT CAN CAUSE LOW BLOOD SUGAR OTHERWISE.     -Given alternate brands of Trulicity to see if insurance will cover them  - in past discussed that alcohol metformin and trulicity combination risks of pancreatitis as well. he is aware of SE  - advised to check BG 3 times a day and bring glucometer next visit   - if plan for GC injection advised to call me   -BP at goal, on ARB  -creatinine normal   on statin. LDL at goal  -eye exam due   -negative microalbumin  -Pt instructed to call office with any hypoglycemia, hyperglycemia, or any other concerns.     Vitamin D deficiency:   Stop vitamin D 50,000 IU weekly.  Start vitamin D3 over-the-counter 4000 units daily    # Hypercalcemia: Mild hypocalcemia which is not new recent blood work shows calcium 11.7 albumin 4.6  He denies history of kidney stones, no family history of hypercalcemia or hyperparathyroidism  No known osteoporosis or kidney disease  He is a current smoker, less than 1 pack a day for last 50 years approximately  Did had CT lung with some nodules followed with primary care physician  Will repeat PTH, PTH RP, vitamin D 1,25 OH, vitamin D 25 OH, ionized calcium, CMP     Hypothyroidism:TSH was 0. 5 1   clinically euthyroid    175- 1 tab mon- fri 1/2 tab sat and none on sun    once he is done with 175 mcg tabs , will change to 137 mcg 1 tab daily in future      RTC 6m      SH- works with [employer], works from home , has 3 kid and 3 step kids, 15 GKs ,  TWIN GK CAME IN  AUG 2023 .   Planning group home August 2, 2024

## 2024-05-04 LAB — CA-I BLD-SCNC: 1.39 MMOL/L (ref 1.1–1.33)

## 2024-05-06 LAB
1,25(OH)2D3 SERPL-MCNC: 58.9 PG/ML (ref 19.9–79.3)
PTH-INTACT SERPL-MCNC: 18.3 PG/ML (ref 18.5–88)

## 2024-05-10 LAB — PTH RELATED PROT SERPL-SCNC: 0.6 PMOL/L

## 2024-05-16 DIAGNOSIS — E83.52 HYPERCALCEMIA: ICD-10-CM

## 2024-05-16 DIAGNOSIS — E11.59 TYPE 2 DIABETES MELLITUS WITH OTHER CIRCULATORY COMPLICATION, WITHOUT LONG-TERM CURRENT USE OF INSULIN (MULTI): Primary | ICD-10-CM

## 2024-05-21 ENCOUNTER — ANCILLARY PROCEDURE (OUTPATIENT)
Dept: CARDIOLOGY | Facility: CLINIC | Age: 69
End: 2024-05-21
Payer: MEDICARE

## 2024-05-21 ENCOUNTER — OFFICE VISIT (OUTPATIENT)
Dept: CARDIOLOGY | Facility: CLINIC | Age: 69
End: 2024-05-21
Payer: MEDICARE

## 2024-05-21 VITALS
BODY MASS INDEX: 36.65 KG/M2 | HEART RATE: 69 BPM | TEMPERATURE: 98.8 F | HEIGHT: 68 IN | WEIGHT: 241.8 LBS | SYSTOLIC BLOOD PRESSURE: 120 MMHG | DIASTOLIC BLOOD PRESSURE: 80 MMHG

## 2024-05-21 DIAGNOSIS — Z95.810 ICD (IMPLANTABLE CARDIOVERTER-DEFIBRILLATOR) IN PLACE: ICD-10-CM

## 2024-05-21 DIAGNOSIS — Z95.810 ICD (IMPLANTABLE CARDIOVERTER-DEFIBRILLATOR) IN PLACE: Primary | ICD-10-CM

## 2024-05-21 PROCEDURE — 3060F POS MICROALBUMINURIA REV: CPT | Performed by: INTERNAL MEDICINE

## 2024-05-21 PROCEDURE — 1159F MED LIST DOCD IN RCRD: CPT | Performed by: INTERNAL MEDICINE

## 2024-05-21 PROCEDURE — 93283 PRGRMG EVAL IMPLANTABLE DFB: CPT | Performed by: INTERNAL MEDICINE

## 2024-05-21 PROCEDURE — 3008F BODY MASS INDEX DOCD: CPT | Performed by: INTERNAL MEDICINE

## 2024-05-21 PROCEDURE — 99213 OFFICE O/P EST LOW 20 MIN: CPT | Performed by: INTERNAL MEDICINE

## 2024-05-21 PROCEDURE — 3079F DIAST BP 80-89 MM HG: CPT | Performed by: INTERNAL MEDICINE

## 2024-05-21 PROCEDURE — 1160F RVW MEDS BY RX/DR IN RCRD: CPT | Performed by: INTERNAL MEDICINE

## 2024-05-21 PROCEDURE — 3074F SYST BP LT 130 MM HG: CPT | Performed by: INTERNAL MEDICINE

## 2024-05-21 PROCEDURE — 4010F ACE/ARB THERAPY RXD/TAKEN: CPT | Performed by: INTERNAL MEDICINE

## 2024-05-21 PROCEDURE — 3048F LDL-C <100 MG/DL: CPT | Performed by: INTERNAL MEDICINE

## 2024-05-21 RX ORDER — CLOPIDOGREL BISULFATE 75 MG/1
75 TABLET ORAL DAILY
Qty: 90 TABLET | Refills: 1 | Status: SHIPPED | OUTPATIENT
Start: 2024-05-21

## 2024-05-21 RX ORDER — CHOLECALCIFEROL (VITAMIN D3) 50 MCG
100 TABLET ORAL DAILY
COMMUNITY

## 2024-05-21 NOTE — PROGRESS NOTES
Subjective:  The patient is a 68-year-old white male who presents today for ICD follow-up.  He suffered an inferoposterior myocardial infarction at age 45, with a full cardiac arrest.  He was successfully resuscitated, and has had multiple percutaneous interventions over the years.  His LVEF is 40-55%.  He had inducible VT by EP testing in September 2004 and underwent [employer] DDDR ICD placement by Dr. Eron Ardon.  He had some shocks due to atrial tachycardia, prompting reprogramming of the device.  His generator was replaced by Dr. Nahomy Wade in May 2011.  He was lost to follow-up for several years but reestablished with us in 2019.  He is known to be nearing the elective replacement indicator of his device.  He denies any ICD shocks.    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  He plans to fully retire in August 2024.  He enjoys golf, and recently played a good round, but his next time out played terribly.    Current Outpatient Medications   Medication Sig    aspirin 81 mg EC tablet Take 1 tablet (81 mg) by mouth once daily.    atenolol (Tenormin) 50 mg tablet Take 1 tablet (50 mg) by mouth 2 times a day.    BENZONATATE ORAL Take 100 mg by mouth. (Benzonatate 100 MG Oral Capsule);  1 CAPSULE 2-3 TIMES DAILY as needed    cholecalciferol (Vitamin D-3) 50 MCG (2000 UT) tablet Take 2 tablets (100 mcg) by mouth once daily.    fenofibrate (Tricor) 145 mg tablet TAKE 1 TABLET DAILY WITH   FOOD    FLUoxetine (PROzac) 20 mg tablet Take 1 tablet (20 mg) by mouth once daily.    fluticasone (Flonase) 50 mcg/actuation nasal spray Administer 1 spray into affected nostril(s) once daily.    icosapent ethyL (Vascepa) 1 gram capsule Take 2 capsules (2 g) by mouth 2 times a day.    insulin glargine (Basaglar KwikPen U-100 Insulin) 100 unit/mL (3 mL) pen Inject 20 Units under the skin once daily at bedtime. Take as directed per insulin instructions.    levothyroxine (Synthroid, Levoxyl) 175 mcg  tablet TAKE 1 TABLET DAILY    losartan (Cozaar) 50 mg tablet Take 1 tablet (50 mg) by mouth 2 times a day.    magnesium oxide (Mag-Ox) 400 mg (241.3 mg magnesium) tablet Take 1 tablet (400 mg) by mouth once daily.    MELOXICAM ORAL Take 15 mg by mouth. (Meloxicam 15 MG Oral Tablet);  TAKE 1 TABLET DAILY as NEEDED    nitroglycerin (Nitrostat) 0.4 mg SL tablet Place 1 tablet (0.4 mg) under the tongue every 5 minutes if needed.    omeprazole (PriLOSEC) 40 mg DR capsule TAKE 1 CAPSULE ONCE DAILY    pioglitazone (Actos) 45 mg tablet TAKE 1 TABLET DAILY    rosuvastatin (Crestor) 40 mg tablet TAKE 1 TABLET DAILY    Synjardy 12.5-1,000 mg TAKE 2 TABLETS DAILY    tadalafil (Cialis) 5 mg tablet Take 1 tablet (5 mg) by mouth once daily.    Trulicity 3 mg/0.5 mL pen injector Inject under the skin 1 (one) time per week.    VITAMIN B COMPLEX ORAL Take 1 tablet by mouth once daily.    ZINC ORAL Take 100 mg by mouth. (Zinc 100 MG Oral Tablet)    clopidogrel (Plavix) 75 mg tablet Take 1 tablet (75 mg) by mouth once daily.     Allergies:  Codeine, Hydrocodone-acetaminophen, and Lisinopril     Patient Active Problem List   Diagnosis    BPH (benign prostatic hyperplasia)    CAD (coronary artery disease)    Chronic kidney disease, stage 3 (Multi)    Depression with anxiety    Type 2 diabetes mellitus with other circulatory complication, without long-term current use of insulin (Multi)    Dyslipidemia    Fatty liver    GERD (gastroesophageal reflux disease)    Tobacco use disorder    History of cardiac pacemaker    Hypothyroidism, adult    ICD (implantable cardioverter-defibrillator) in place    Ischemic cardiomyopathy    Low back pain    Male erectile disorder of organic origin    Mixed conductive and sensorineural hearing loss of left ear with restricted hearing of right ear    Mixed hyperlipidemia    MARY on CPAP    S/P PTCA (percutaneous transluminal coronary angioplasty)    Vitamin D deficiency    Ventricular tachycardia (paroxysmal)  "(Multi)    Weakness    Stiffness in joint    Actinic keratosis    Acute chest pain    Abnormal results of cardiovascular function studies    Acute bronchitis, unspecified    ASNHL (asymmetrical sensorineural hearing loss)    Benign prostatic hyperplasia with weak urinary stream    BMI 38.0-38.9,adult    Cerumen impaction    Class 2 obesity with body mass index (BMI) of 35.0 to 35.9 in adult    Dermatitis of external ear    Deviated septum    Difficulty hearing    Ear itching    Hearing loss in right ear    Internal hemorrhoids with complication    Nasal congestion    Nocturia    Obese    Pain in joint of left shoulder    Past myocardial infarction    Sinusitis    Supraventricular tachycardia (CMS-HCC)    Urgency of urination    Myocardial infarction (Multi)    Anxiety    Type 2 diabetes mellitus (Multi)    Sleep apnea     Past Surgical History:   Procedure Laterality Date    OTHER SURGICAL HISTORY  06/30/2015    Cardioverter-defibrillator Pulse Generator Insertion    OTHER SURGICAL HISTORY  09/23/2021    Colonoscopy    OTHER SURGICAL HISTORY  09/23/2021    Percutaneous transluminal coronary angioplasty    OTHER SURGICAL HISTORY  09/23/2021    Cardiac catheterization    TONSILLECTOMY  06/30/2015    Tonsillectomy     Objective:  Vitals:    05/21/24 1643   BP: 120/80   Pulse: 69   Temp: 37.1 °C (98.8 °F)   Height:     1.727 m (5' 8\")  Weight: 110 kg (241 lb 12.8 oz)     Exam:  Gen: Stocky bearded gentleman in no distress; alert and oriented  HEENT: No scleral icterus.  Neck: No jugular venous distention or thyromegaly.  Left subclavian ICD pocket: Unremarkable to palpation.  Lungs: Clear to auscultation, with no wheezes or rales.  Heart: Regular rhythm with grade 1/6 functional flow murmur along the sternal border with normal S2.  Abdomen: Benign, with no organomegaly or masses.  Extremities: Intact distal pulses; no edema.  Neuro: No focal neurologic abnormalities.  Skin: No cutaneous lesions.    Device Check:  A " [employer] ICD check was done.  The unit is programmed for backup DDI pacing at 60 bpm, which provides 3% atrial pacing and 0% ventricular pacing.  The battery longevity is now estimated at less than 3 months.  There was no burden of atrial fibrillation.    Impressions:  1.  Coronary artery disease, status post inferoposterior myocardial infarction 24 years ago with a known occluded circumflex and more recent RCA stenting in 2020.  He is followed by Dr. aSvita Newman.  2.  Mild ischemic cardiomyopathy (LVEF 40-55%) with no heart failure symptoms.  3.  Inducible ventricular tachycardia in 2004, leading to ICD placement.  He had a cardiac arrest with his myocardial infarction as well.  4.  Status post [employer] DDDR ICD systems in September 2004 and May 2011.  The current device is very close to the elective replacement indicator.  5.  Other medical problems, including hypertension, type 2 diabetes, hyperlipidemia, hypothyroidism, obesity, and chronic tobacco use, followed by Dr. Jose Jernigan.    Recommendations:  1.  The patient will follow-up every 2 months now for device checks.  2.  When he reaches the elective replacement indicator of his device, this will be replaced on an outpatient basis.      Stanislaw Sanchez MD

## 2024-05-23 ENCOUNTER — APPOINTMENT (OUTPATIENT)
Dept: SLEEP MEDICINE | Facility: CLINIC | Age: 69
End: 2024-05-23
Payer: MEDICARE

## 2024-05-29 ENCOUNTER — OFFICE VISIT (OUTPATIENT)
Dept: UROLOGY | Facility: CLINIC | Age: 69
End: 2024-05-29
Payer: MEDICARE

## 2024-05-29 VITALS
SYSTOLIC BLOOD PRESSURE: 125 MMHG | TEMPERATURE: 96.4 F | DIASTOLIC BLOOD PRESSURE: 78 MMHG | WEIGHT: 241 LBS | BODY MASS INDEX: 36.64 KG/M2 | HEART RATE: 60 BPM

## 2024-05-29 DIAGNOSIS — N40.1 BENIGN PROSTATIC HYPERPLASIA WITH LOWER URINARY TRACT SYMPTOMS, SYMPTOM DETAILS UNSPECIFIED: Primary | ICD-10-CM

## 2024-05-29 DIAGNOSIS — N52.9 ERECTILE DYSFUNCTION, UNSPECIFIED ERECTILE DYSFUNCTION TYPE: ICD-10-CM

## 2024-05-29 PROCEDURE — 3048F LDL-C <100 MG/DL: CPT | Performed by: STUDENT IN AN ORGANIZED HEALTH CARE EDUCATION/TRAINING PROGRAM

## 2024-05-29 PROCEDURE — 3008F BODY MASS INDEX DOCD: CPT | Performed by: STUDENT IN AN ORGANIZED HEALTH CARE EDUCATION/TRAINING PROGRAM

## 2024-05-29 PROCEDURE — 4010F ACE/ARB THERAPY RXD/TAKEN: CPT | Performed by: STUDENT IN AN ORGANIZED HEALTH CARE EDUCATION/TRAINING PROGRAM

## 2024-05-29 PROCEDURE — 3060F POS MICROALBUMINURIA REV: CPT | Performed by: STUDENT IN AN ORGANIZED HEALTH CARE EDUCATION/TRAINING PROGRAM

## 2024-05-29 PROCEDURE — 3074F SYST BP LT 130 MM HG: CPT | Performed by: STUDENT IN AN ORGANIZED HEALTH CARE EDUCATION/TRAINING PROGRAM

## 2024-05-29 PROCEDURE — 99213 OFFICE O/P EST LOW 20 MIN: CPT | Performed by: STUDENT IN AN ORGANIZED HEALTH CARE EDUCATION/TRAINING PROGRAM

## 2024-05-29 PROCEDURE — 1159F MED LIST DOCD IN RCRD: CPT | Performed by: STUDENT IN AN ORGANIZED HEALTH CARE EDUCATION/TRAINING PROGRAM

## 2024-05-29 PROCEDURE — 1160F RVW MEDS BY RX/DR IN RCRD: CPT | Performed by: STUDENT IN AN ORGANIZED HEALTH CARE EDUCATION/TRAINING PROGRAM

## 2024-05-29 PROCEDURE — 3078F DIAST BP <80 MM HG: CPT | Performed by: STUDENT IN AN ORGANIZED HEALTH CARE EDUCATION/TRAINING PROGRAM

## 2024-05-29 RX ORDER — TADALAFIL 5 MG/1
5 TABLET ORAL DAILY
Qty: 30 TABLET | Refills: 11 | Status: SHIPPED | OUTPATIENT
Start: 2024-05-29

## 2024-05-29 NOTE — PROGRESS NOTES
Scribed for Dr. Conor Yin by Vikas Talbert. I, Dr. Conor Yin have personally reviewed and agreed with the information entered by the Virtual Scribe. 05/29/24.    ASSESSMENT:  Problem List Items Addressed This Visit       BPH (benign prostatic hyperplasia) - Primary    Relevant Medications    tadalafil (Cialis) 5 mg tablet     Other Visit Diagnoses       Erectile dysfunction, unspecified erectile dysfunction type        Relevant Medications    tadalafil (Cialis) 5 mg tablet          BPH with weak urinary stream - stream has improved on tadalafil.  Urinary frequency and urgency  Erectile dysfunction  Nocturia    PLAN:  #BPH  #ED  Continue tadalafil 5mg daily for BPH and ED treatment.  Denies any side-effects and wishes to continue.  Refill provided.   RTC in 1 year for reassessment.     Notes persistent ED despite tadalafil.  Reports that he has used NG in the past and is prescribed PRN.  He has not had to use it in recent years, however states he will not take this while taking tadalafil after extensive conversation of the risks today and at previous visits    #Nocturia  We discussed the natural history of nocturia and how is less often due to prostate enlargement and can be more related to medical issues such as sleep apnea, nocturnal polyuria, or modifiable risk factors such as fluid or caffeine intake. I have advised the following:  Advised avoiding caffeine after noon  Advised avoiding fluids altogether after 5-6pm.  Reevaluation with sleep medicine for use of CPAP consistently     All questions were answered to the patient’s satisfaction.  Patient agrees with the plan and wishes to proceed.  Continue follow-up for ongoing care of his chronic medical conditions.       History of Present Illness (HPI):  Silvio presents for an annual follow up visit.  The patient’s EMR has been reviewed.  Lives in [place] and is   Initially referred by his Endocrinologist.   Given history of going urinary issues.      History of BPH with nocturia, and ED.   Since started tadalafil 5mg daily for treatment of his BPH and ED.  Denies any side-effects from this.   Still experiencing persistent ED.   Discussed alternatives including Viagra PRN.  However, he is prescribed NG, and opted to withhold.  Presents for annual follow up.    TODAY: (05/29/24)  Regarding his urinary symptoms:   Reports a good response to tadalafil.  Stream remains OK, no significant change since last visit.  Denies any side-effects, no back pain or GERD.  He continues to have a split urine stream,   however this is chronic and not bothersome.   Denies any gross hematuria or recent UTIs.    From a sexual health standpoint:  Denies any improvement of his ED, despite tadalafil.  Gets good erections <50% of the time.   However, he wishes to continue the medication;  as it has improved his urinary symptoms.  Has used NG in the past and is prescribed PRN;  but he has not had to use it over the last couple years.     *States that he has since follow up with his Cardiologist.   - He claims he was told that he may add any ED medication to his regiment.   - States he expressed low concern of side-effects if Viagra PRN added.     TO REVIEW: Last visit (05/31/23)  Regarding his urinary symptoms:   Reports a good response to tadalafil.  Stream remains OK, no significant change since last visit.  He continues to have a split urine stream,   however this is chronic and not bothersome.   Denies any gross hematuria or recent UTIs.    From a sexual health standpoint:  Denies any large difference regarding his ED, despite tadalafil.  Gets good erections <50% of the time.   However, he wishes to continue the medication;  as it has improved his urinary symptoms.  Has used NG in the past and is prescribed PRN;  but he has not had to use it over the last couple years.     TO REVIEW:  From a urinary standpoint.  Reports an OK stream, not as strong as it used to be  Goes frequently,  few times an hour  3x nightly -> 0-1x now on tadalafil  Has some urgency with this but isn't rushing  The higher his blood sugar, the more frequent the urination  Waking up less at night since starting tadalafil, very happy with this     He is a diabetic, has + glucose on UA today but no infection  Sugars have been better around 130s now  Denies hematuria, UTIs, or stones  States he takes enough medications already     States he snores badly, has a CPAP but doesn't use it  Drinks decaf coffee in the AM only    From a sexual health standpoint.  Reports ED, becoming more of an issue  NONA 16, moderate confidence he can get the erection  Keeping the erection usually not a problem  Also reports PE his entire life  Erections are much improved with the tadalafil  Having AM erections now even    Past Medical History:   Diagnosis Date    Atherosclerotic heart disease of native coronary artery without angina pectoris 2019    Coronary artery disease without angina pectoris, unspecified vessel or lesion type, unspecified whether native or transplanted heart    Diverticulitis, colon 10/31/2023     Past Surgical History:   Procedure Laterality Date    OTHER SURGICAL HISTORY  2015    Cardioverter-defibrillator Pulse Generator Insertion    OTHER SURGICAL HISTORY  2021    Colonoscopy    OTHER SURGICAL HISTORY  2021    Percutaneous transluminal coronary angioplasty    OTHER SURGICAL HISTORY  2021    Cardiac catheterization    TONSILLECTOMY  2015    Tonsillectomy     Family History   Problem Relation Name Age of Onset    Other (malignant neoplasm of breast) Mother      Other (cardiac disorder) Father      Other (ischemic heart disease) Other       Social History     Tobacco Use   Smoking Status Every Day    Current packs/day: 0.00    Types: Cigarettes    Last attempt to quit: 2023    Years since quittin.8   Smokeless Tobacco Never     Current Outpatient Medications   Medication Sig Dispense  "Refill    aspirin 81 mg EC tablet Take 1 tablet (81 mg) by mouth once daily.      BENZONATATE ORAL Take 100 mg by mouth. (Benzonatate 100 MG Oral Capsule);  1 CAPSULE 2-3 TIMES DAILY as needed      cholecalciferol (Vitamin D-3) 50 MCG (2000 UT) tablet Take 2 tablets (100 mcg) by mouth once daily.      clopidogrel (Plavix) 75 mg tablet Take 1 tablet (75 mg) by mouth once daily. 90 tablet 1    fenofibrate (Tricor) 145 mg tablet TAKE 1 TABLET DAILY WITH   FOOD 90 tablet 3    FLUoxetine (PROzac) 20 mg tablet Take 1 tablet (20 mg) by mouth once daily. 90 tablet 1    fluticasone (Flonase) 50 mcg/actuation nasal spray Administer 1 spray into affected nostril(s) once daily.      FreeStyle Lite Strips strip USE TWICE DAILY      icosapent ethyL (Vascepa) 1 gram capsule Take 2 capsules (2 g) by mouth 2 times a day.      insulin glargine (Basaglar KwikPen U-100 Insulin) 100 unit/mL (3 mL) pen Inject 20 Units under the skin once daily at bedtime. Take as directed per insulin instructions. 30 mL 1    levothyroxine (Synthroid, Levoxyl) 175 mcg tablet TAKE 1 TABLET DAILY 90 tablet 2    losartan (Cozaar) 50 mg tablet Take 1 tablet (50 mg) by mouth 2 times a day. 180 tablet 3    magnesium oxide (Mag-Ox) 400 mg (241.3 mg magnesium) tablet Take 1 tablet (400 mg) by mouth once daily.      MELOXICAM ORAL Take 15 mg by mouth. (Meloxicam 15 MG Oral Tablet);  TAKE 1 TABLET DAILY as NEEDED      nitroglycerin (Nitrostat) 0.4 mg SL tablet Place 1 tablet (0.4 mg) under the tongue every 5 minutes if needed.      omeprazole (PriLOSEC) 40 mg DR capsule TAKE 1 CAPSULE ONCE DAILY 90 capsule 2    pen needle, diabetic 32 gauge x 5/32\" needle 1 Needle once daily. 100 each 11    pioglitazone (Actos) 45 mg tablet TAKE 1 TABLET DAILY 90 tablet 2    rosuvastatin (Crestor) 40 mg tablet TAKE 1 TABLET DAILY 90 tablet 2    Synjardy 12.5-1,000 mg TAKE 2 TABLETS DAILY 180 tablet 2    tadalafil (Cialis) 5 mg tablet Take 1 tablet (5 mg) by mouth once daily.      " Trulicity 3 mg/0.5 mL pen injector Inject under the skin 1 (one) time per week.      VITAMIN B COMPLEX ORAL Take 1 tablet by mouth once daily.      ZINC ORAL Take 100 mg by mouth. (Zinc 100 MG Oral Tablet)      atenolol (Tenormin) 50 mg tablet Take 1 tablet (50 mg) by mouth 2 times a day. 90 tablet 1     No current facility-administered medications for this visit.     Allergies   Allergen Reactions    Codeine Unknown     Blood pressure goes down    Hydrocodone-Acetaminophen Unknown     Blood pressure goes up    Lisinopril Cough     Past medical, surgical, family and social history in the chart was reviewed and is accurate including any additions to what is in this HPI.    REVIEW OF SYSTEMS (ROS):   Constitutional: denies any unintentional weight loss or change in strength.  Integumentary: denies any rashes or pruritus.  Eyes: denies any double vision or eye pain.  Ear/Nose/Mouth/Throat: denies any nosebleeds or gum bleeds.  Cardiovascular: denies any chest pain or syncope.  Respiratory: denies hemoptysis.  Gastrointestinal: denies nausea or vomiting.  Musculoskeletal: denies muscle cramping or weakness.  Neurologic: denies convulsions or seizures.  Hematologic/Lymphatic: denies bleeding tendencies.  Endocrine: denies heat/cold intolerance.  All other systems have been reviewed and are negative unless otherwise noted in the HPI.     OBJECTIVE:  Visit Vitals  /78   Pulse 60   Temp 35.8 °C (96.4 °F)     PHYSICAL EXAM:  Constitutional: No obvious distress.  Eyes: Non-injected conjunctiva, sclera clear, EOMI.  Ears/Nose/Mouth/Throat: No obvious drainage per ears or nose.  Cardiovascular: Extremities are warm and well perfused. No edema, cyanosis or pallor.  Respiratory: No audible wheezing/stridor; respirations do not appear labored.  Gastrointestinal: Abdomen soft, not distended.  Musculoskeletal: Normal ROM of extremities.  Skin: No obvious rashes or open sores.  Neurologic: Alert and oriented, CN 2-12 grossly  intact.  Psychiatric: Answers questions appropriately with normal affect.  Hematologic/Lymphatic/Immunologic: No obvious bruises or sites of spontaneous bleeding.  Genitourinary: No CVA tenderness, bladder not palpable.     LABS & IMAGING:  Lab Results   Component Value Date    WBC 10.2 01/20/2024    HGB 15.4 01/20/2024    HCT 46.4 01/20/2024     01/20/2024    CHOL 103 05/03/2024    TRIG 344 (H) 05/03/2024    HDL 23.7 05/03/2024    LDLDIRECT 44 01/08/2020    ALT 14 05/03/2024    AST 19 05/03/2024     05/03/2024    K 4.0 05/03/2024     05/03/2024    CREATININE 1.18 05/03/2024    BUN 17 05/03/2024    CO2 24 05/03/2024    TSH 0.48 05/03/2024    PSA 2.30 07/15/2020    HGBA1C 6.5 05/03/2024     Scribed for Dr. Conor Yin by Vikas Talbert.  I, Dr. Conor Yin have personally reviewed and agreed with the information entered by the Virtual Scribe. 05/29/24.

## 2024-07-15 DIAGNOSIS — I10 HTN (HYPERTENSION), BENIGN: ICD-10-CM

## 2024-07-15 DIAGNOSIS — F41.9 ANXIETY: ICD-10-CM

## 2024-07-15 RX ORDER — FLUOXETINE 20 MG/1
20 TABLET ORAL DAILY
Qty: 90 TABLET | Refills: 1 | Status: SHIPPED | OUTPATIENT
Start: 2024-07-15

## 2024-07-15 RX ORDER — ATENOLOL 50 MG/1
25 TABLET ORAL 2 TIMES DAILY
Qty: 90 TABLET | Refills: 1 | Status: SHIPPED | OUTPATIENT
Start: 2024-07-15

## 2024-07-22 ENCOUNTER — APPOINTMENT (OUTPATIENT)
Dept: CARDIOLOGY | Facility: CLINIC | Age: 69
End: 2024-07-22
Payer: MEDICARE

## 2024-07-22 DIAGNOSIS — Z95.810 ICD (IMPLANTABLE CARDIOVERTER-DEFIBRILLATOR) IN PLACE: ICD-10-CM

## 2024-07-22 PROCEDURE — 93283 PRGRMG EVAL IMPLANTABLE DFB: CPT | Performed by: INTERNAL MEDICINE

## 2024-07-23 ENCOUNTER — PREP FOR PROCEDURE (OUTPATIENT)
Dept: CARDIOLOGY | Facility: CLINIC | Age: 69
End: 2024-07-23
Payer: MEDICARE

## 2024-07-24 DIAGNOSIS — Z86.74 H/O CARDIAC ARREST: ICD-10-CM

## 2024-07-24 DIAGNOSIS — Z95.810 ICD (IMPLANTABLE CARDIOVERTER-DEFIBRILLATOR) IN PLACE: Primary | ICD-10-CM

## 2024-08-01 ENCOUNTER — TELEPHONE (OUTPATIENT)
Dept: VASCULAR SURGERY | Facility: CLINIC | Age: 69
End: 2024-08-01
Payer: MEDICARE

## 2024-08-19 ENCOUNTER — APPOINTMENT (OUTPATIENT)
Dept: CARDIOLOGY | Facility: CLINIC | Age: 69
End: 2024-08-19
Payer: MEDICARE

## 2024-08-19 VITALS
BODY MASS INDEX: 36.98 KG/M2 | HEART RATE: 66 BPM | WEIGHT: 244 LBS | SYSTOLIC BLOOD PRESSURE: 117 MMHG | HEIGHT: 68 IN | DIASTOLIC BLOOD PRESSURE: 79 MMHG

## 2024-08-19 DIAGNOSIS — I25.2 PAST MYOCARDIAL INFARCTION: ICD-10-CM

## 2024-08-19 DIAGNOSIS — I25.10 CORONARY ARTERY DISEASE INVOLVING NATIVE HEART, UNSPECIFIED VESSEL OR LESION TYPE, UNSPECIFIED WHETHER ANGINA PRESENT: ICD-10-CM

## 2024-08-19 DIAGNOSIS — E78.2 MIXED HYPERLIPIDEMIA: ICD-10-CM

## 2024-08-19 DIAGNOSIS — I25.5 ISCHEMIC CARDIOMYOPATHY: ICD-10-CM

## 2024-08-19 DIAGNOSIS — F17.200 TOBACCO USE DISORDER: ICD-10-CM

## 2024-08-19 PROCEDURE — 1159F MED LIST DOCD IN RCRD: CPT | Performed by: INTERNAL MEDICINE

## 2024-08-19 PROCEDURE — 4010F ACE/ARB THERAPY RXD/TAKEN: CPT | Performed by: INTERNAL MEDICINE

## 2024-08-19 PROCEDURE — 3078F DIAST BP <80 MM HG: CPT | Performed by: INTERNAL MEDICINE

## 2024-08-19 PROCEDURE — 3074F SYST BP LT 130 MM HG: CPT | Performed by: INTERNAL MEDICINE

## 2024-08-19 PROCEDURE — 99214 OFFICE O/P EST MOD 30 MIN: CPT | Performed by: INTERNAL MEDICINE

## 2024-08-19 PROCEDURE — 3048F LDL-C <100 MG/DL: CPT | Performed by: INTERNAL MEDICINE

## 2024-08-19 PROCEDURE — 4004F PT TOBACCO SCREEN RCVD TLK: CPT | Performed by: INTERNAL MEDICINE

## 2024-08-19 PROCEDURE — 3060F POS MICROALBUMINURIA REV: CPT | Performed by: INTERNAL MEDICINE

## 2024-08-19 PROCEDURE — 3008F BODY MASS INDEX DOCD: CPT | Performed by: INTERNAL MEDICINE

## 2024-08-19 ASSESSMENT — ENCOUNTER SYMPTOMS
CONSTITUTIONAL NEGATIVE: 1
RESPIRATORY NEGATIVE: 1
CARDIOVASCULAR NEGATIVE: 1
NEUROLOGICAL NEGATIVE: 1

## 2024-08-19 NOTE — PROGRESS NOTES
Referred by Dr. Guzman ref. provider found provider found for   Chief Complaint   Patient presents with    Follow-up     Follow up        History of Present Illness  Silvio Noel is a 69 y.o. year old male patient status post previous ICD implantation.  Scheduled for redo ICD because of the battery depletion.  Doing well from a cardiac standpoint no complaint no symptoms of chest pain or shortness of breath.  Has been ambulating with no difficulties.  Denies symptoms of palpitation or syncope.  Blood pressure is adequately controlled.  I discussed with the patient in length we will continue medication will call for any problem and follow-up as scheduled    Past Medical History  Past Medical History:   Diagnosis Date    Atherosclerotic heart disease of native coronary artery without angina pectoris 2019    Coronary artery disease without angina pectoris, unspecified vessel or lesion type, unspecified whether native or transplanted heart    Diverticulitis, colon 10/31/2023       Social History  Social History     Tobacco Use    Smoking status: Every Day     Current packs/day: 0.00     Types: Cigarettes     Last attempt to quit: 2023     Years since quittin.0    Smokeless tobacco: Never   Vaping Use    Vaping status: Never Used   Substance Use Topics    Alcohol use: Yes     Alcohol/week: 5.0 standard drinks of alcohol     Types: 5 Standard drinks or equivalent per week    Drug use: Not Currently       Family History     Family History   Problem Relation Name Age of Onset    Other (malignant neoplasm of breast) Mother      Other (cardiac disorder) Father      Other (ischemic heart disease) Other         Review of Systems  As per HPI, all other systems reviewed and negative.    Allergies:  Allergies   Allergen Reactions    Codeine Unknown     Blood pressure goes down    Hydrocodone-Acetaminophen Unknown     Blood pressure goes up    Lisinopril Cough        Outpatient Medications:  Current Outpatient  "Medications   Medication Instructions    aspirin 81 mg EC tablet 1 tablet, oral, Daily    atenolol (TENORMIN) 25 mg, oral, 2 times daily    BENZONATATE ORAL 100 mg, oral, (Benzonatate 100 MG Oral Capsule);  1 CAPSULE 2-3 TIMES DAILY as needed    cholecalciferol (VITAMIN D-3) 100 mcg, oral, Daily, 400mcg once daily    clopidogrel (PLAVIX) 75 mg, oral, Daily    cyanocobalamin, vitamin B-12, (VITAMIN B-12 ORAL) oral    fenofibrate (TRICOR) 145 mg, oral, Daily, Take with food.    FLUoxetine (PROZAC) 20 mg, oral, Daily    fluticasone (Flonase) 50 mcg/actuation nasal spray 1 spray, nasal, Daily    FreeStyle Lite Strips strip USE TWICE DAILY    icosapent ethyL (Vascepa) 1 gram capsule 2 capsules, oral, 2 times daily    insulin glargine (BASAGLAR KWIKPEN U-100 INSULIN) 20 Units, subcutaneous, Nightly, Take as directed per insulin instructions.    levothyroxine (SYNTHROID, LEVOXYL) 175 mcg, oral, Daily    losartan (COZAAR) 50 mg, oral, 2 times daily    magnesium oxide (Mag-Ox) 400 mg (241.3 mg magnesium) tablet 1 tablet, oral, Daily    MELOXICAM ORAL 15 mg, oral, (Meloxicam 15 MG Oral Tablet);  TAKE 1 TABLET DAILY as NEEDED    nitroglycerin (NITROSTAT) 0.4 mg, sublingual, Every 5 min PRN    omeprazole (PRILOSEC) 40 mg, oral, Daily    pen needle, diabetic 32 gauge x 5/32\" needle 1 Needle, miscellaneous, Daily    pioglitazone (ACTOS) 45 mg, oral, Daily    rosuvastatin (CRESTOR) 40 mg, oral, Daily    Synjardy 12.5-1,000 mg 2 tablets, oral, Daily    tadalafil (CIALIS) 5 mg, oral, Daily    Trulicity 3 mg/0.5 mL pen injector subcutaneous, Once Weekly    VITAMIN B COMPLEX ORAL 1 tablet, oral, Daily    ZINC ORAL 100 mg, oral, (Zinc 100 MG Oral Tablet)         Vitals:  Vitals:    08/19/24 1524   BP: 117/79   Pulse: 66       Physical Exam:  Physical Exam  Constitutional:       Appearance: He is obese.   Neck:      Vascular: No carotid bruit.   Cardiovascular:      Rate and Rhythm: Normal rate and regular rhythm.      Pulses: Normal " pulses.      Heart sounds: Normal heart sounds. No murmur heard.  Pulmonary:      Effort: Pulmonary effort is normal.      Breath sounds: Normal breath sounds.   Skin:     General: Skin is warm and dry.   Neurological:      General: No focal deficit present.      Mental Status: He is alert and oriented to person, place, and time.         Review of Systems   Constitutional: Negative.    Respiratory: Negative.     Cardiovascular: Negative.    Neurological: Negative.          Assessment/Plan   Problem List Items Addressed This Visit             ICD-10-CM    CAD (coronary artery disease) I25.10    Tobacco use disorder F17.200    Ischemic cardiomyopathy I25.5    Mixed hyperlipidemia E78.2    BMI 37.0-37.9, adult Z68.37    Past myocardial infarction I25.2       Scribe Attestation  By signing my name below, Annelise MURPHY LPN  , Scribe   attest that this documentation has been prepared under the direction and in the presence of Savita Newman MD.     Savita Newman MD [facility]  Interventional Cardiology   of [facility]     Thank you for allowing me to participate in the care of this patient. Please do not hesitate to contact me with any further questions or concerns.

## 2024-08-29 ENCOUNTER — APPOINTMENT (OUTPATIENT)
Dept: RADIOLOGY | Facility: HOSPITAL | Age: 69
End: 2024-08-29
Payer: MEDICARE

## 2024-08-29 ENCOUNTER — HOSPITAL ENCOUNTER (OUTPATIENT)
Facility: HOSPITAL | Age: 69
Setting detail: OUTPATIENT SURGERY
Discharge: HOME | End: 2024-08-29
Attending: INTERNAL MEDICINE | Admitting: INTERNAL MEDICINE
Payer: MEDICARE

## 2024-08-29 ENCOUNTER — HOSPITAL ENCOUNTER (OUTPATIENT)
Dept: CARDIOLOGY | Facility: HOSPITAL | Age: 69
Discharge: HOME | End: 2024-08-29

## 2024-08-29 VITALS
BODY MASS INDEX: 36.98 KG/M2 | RESPIRATION RATE: 16 BRPM | HEART RATE: 62 BPM | OXYGEN SATURATION: 95 % | SYSTOLIC BLOOD PRESSURE: 122 MMHG | TEMPERATURE: 97.2 F | DIASTOLIC BLOOD PRESSURE: 76 MMHG | HEIGHT: 68 IN | WEIGHT: 244 LBS

## 2024-08-29 DIAGNOSIS — I47.29 VENTRICULAR TACHYCARDIA (PAROXYSMAL) (MULTI): ICD-10-CM

## 2024-08-29 DIAGNOSIS — Z95.810 ICD (IMPLANTABLE CARDIOVERTER-DEFIBRILLATOR) IN PLACE: ICD-10-CM

## 2024-08-29 DIAGNOSIS — Z86.74 H/O CARDIAC ARREST: ICD-10-CM

## 2024-08-29 DIAGNOSIS — Z95.810 ICD (IMPLANTABLE CARDIOVERTER-DEFIBRILLATOR) IN PLACE: Primary | ICD-10-CM

## 2024-08-29 LAB
ANION GAP SERPL CALC-SCNC: 12 MMOL/L (ref 10–20)
BUN SERPL-MCNC: 24 MG/DL (ref 6–23)
CALCIUM SERPL-MCNC: 10.2 MG/DL (ref 8.6–10.3)
CHLORIDE SERPL-SCNC: 102 MMOL/L (ref 98–107)
CO2 SERPL-SCNC: 25 MMOL/L (ref 21–32)
CREAT SERPL-MCNC: 1.44 MG/DL (ref 0.5–1.3)
EGFRCR SERPLBLD CKD-EPI 2021: 53 ML/MIN/1.73M*2
ERYTHROCYTE [DISTWIDTH] IN BLOOD BY AUTOMATED COUNT: 13.7 % (ref 11.5–14.5)
GLUCOSE SERPL-MCNC: 125 MG/DL (ref 74–99)
HCT VFR BLD AUTO: 42.8 % (ref 41–52)
HGB BLD-MCNC: 14.2 G/DL (ref 13.5–17.5)
MCH RBC QN AUTO: 29.2 PG (ref 26–34)
MCHC RBC AUTO-ENTMCNC: 33.2 G/DL (ref 32–36)
MCV RBC AUTO: 88 FL (ref 80–100)
NRBC BLD-RTO: 0 /100 WBCS (ref 0–0)
PLATELET # BLD AUTO: 184 X10*3/UL (ref 150–450)
POTASSIUM SERPL-SCNC: 4.2 MMOL/L (ref 3.5–5.3)
RBC # BLD AUTO: 4.86 X10*6/UL (ref 4.5–5.9)
SODIUM SERPL-SCNC: 135 MMOL/L (ref 136–145)
WBC # BLD AUTO: 7.4 X10*3/UL (ref 4.4–11.3)

## 2024-08-29 PROCEDURE — 80048 BASIC METABOLIC PNL TOTAL CA: CPT | Performed by: INTERNAL MEDICINE

## 2024-08-29 PROCEDURE — C1721 AICD, DUAL CHAMBER: HCPCS | Performed by: INTERNAL MEDICINE

## 2024-08-29 PROCEDURE — 96376 TX/PRO/DX INJ SAME DRUG ADON: CPT | Performed by: INTERNAL MEDICINE

## 2024-08-29 PROCEDURE — 7100000010 HC PHASE TWO TIME - EACH INCREMENTAL 1 MINUTE: Performed by: INTERNAL MEDICINE

## 2024-08-29 PROCEDURE — 93005 ELECTROCARDIOGRAM TRACING: CPT

## 2024-08-29 PROCEDURE — 33241 REMOVE PULSE GENERATOR: CPT | Performed by: INTERNAL MEDICINE

## 2024-08-29 PROCEDURE — 2500000005 HC RX 250 GENERAL PHARMACY W/O HCPCS: Performed by: INTERNAL MEDICINE

## 2024-08-29 PROCEDURE — 36005 INJECTION EXT VENOGRAPHY: CPT | Performed by: INTERNAL MEDICINE

## 2024-08-29 PROCEDURE — 2780000003 HC OR 278 NO HCPCS: Performed by: INTERNAL MEDICINE

## 2024-08-29 PROCEDURE — 2500000004 HC RX 250 GENERAL PHARMACY W/ HCPCS (ALT 636 FOR OP/ED): Performed by: INTERNAL MEDICINE

## 2024-08-29 PROCEDURE — 99153 MOD SED SAME PHYS/QHP EA: CPT | Performed by: INTERNAL MEDICINE

## 2024-08-29 PROCEDURE — 85027 COMPLETE CBC AUTOMATED: CPT | Performed by: INTERNAL MEDICINE

## 2024-08-29 PROCEDURE — 36415 COLL VENOUS BLD VENIPUNCTURE: CPT | Performed by: INTERNAL MEDICINE

## 2024-08-29 PROCEDURE — C1892 INTRO/SHEATH,FIXED,PEEL-AWAY: HCPCS | Performed by: INTERNAL MEDICINE

## 2024-08-29 PROCEDURE — 33216 INSERT 1 ELECTRODE PM-DEFIB: CPT | Performed by: INTERNAL MEDICINE

## 2024-08-29 PROCEDURE — 99152 MOD SED SAME PHYS/QHP 5/>YRS: CPT | Performed by: INTERNAL MEDICINE

## 2024-08-29 PROCEDURE — 71045 X-RAY EXAM CHEST 1 VIEW: CPT | Performed by: RADIOLOGY

## 2024-08-29 PROCEDURE — 33249 INSJ/RPLCMT DEFIB W/LEAD(S): CPT

## 2024-08-29 PROCEDURE — 33249 INSJ/RPLCMT DEFIB W/LEAD(S): CPT | Performed by: INTERNAL MEDICINE

## 2024-08-29 PROCEDURE — 33241 REMOVE PULSE GENERATOR: CPT

## 2024-08-29 PROCEDURE — 7100000009 HC PHASE TWO TIME - INITIAL BASE CHARGE: Performed by: INTERNAL MEDICINE

## 2024-08-29 PROCEDURE — 33263 RMVL & RPLCMT DFB GEN 2 LEAD: CPT | Performed by: INTERNAL MEDICINE

## 2024-08-29 PROCEDURE — 2750000001 HC OR 275 NO HCPCS: Performed by: INTERNAL MEDICINE

## 2024-08-29 PROCEDURE — 71045 X-RAY EXAM CHEST 1 VIEW: CPT

## 2024-08-29 PROCEDURE — C1777 LEAD, AICD, ENDO SINGLE COIL: HCPCS | Performed by: INTERNAL MEDICINE

## 2024-08-29 PROCEDURE — 2720000007 HC OR 272 NO HCPCS: Performed by: INTERNAL MEDICINE

## 2024-08-29 PROCEDURE — 2550000001 HC RX 255 CONTRASTS: Performed by: INTERNAL MEDICINE

## 2024-08-29 DEVICE — INTEGRATED BIPOLAR PACE/SENSE AND DEFIBRILLATION LEAD
Type: IMPLANTABLE DEVICE | Site: CORONARY | Status: FUNCTIONAL
Brand: RELIANCE 4-FRONT™

## 2024-08-29 DEVICE — IMPLANTABLE CARDIOVERTER DEFIBRILLATOR DR
Type: IMPLANTABLE DEVICE | Site: CHEST  WALL | Status: FUNCTIONAL
Brand: VIGILANT™ EL ICD DR

## 2024-08-29 DEVICE — LEAD CAP KIT: Type: IMPLANTABLE DEVICE | Site: CHEST  WALL | Status: FUNCTIONAL

## 2024-08-29 RX ORDER — ALBUTEROL SULFATE 0.83 MG/ML
2.5 SOLUTION RESPIRATORY (INHALATION) ONCE AS NEEDED
Status: DISCONTINUED | OUTPATIENT
Start: 2024-08-29 | End: 2024-08-29 | Stop reason: HOSPADM

## 2024-08-29 RX ORDER — CEFAZOLIN SODIUM 2 G/50ML
SOLUTION INTRAVENOUS CONTINUOUS PRN
Status: DISCONTINUED | OUTPATIENT
Start: 2024-08-29 | End: 2024-08-29 | Stop reason: HOSPADM

## 2024-08-29 RX ORDER — LIDOCAINE HYDROCHLORIDE 20 MG/ML
INJECTION, SOLUTION INFILTRATION; PERINEURAL AS NEEDED
Status: DISCONTINUED | OUTPATIENT
Start: 2024-08-29 | End: 2024-08-29 | Stop reason: HOSPADM

## 2024-08-29 RX ORDER — MIDAZOLAM HYDROCHLORIDE 1 MG/ML
INJECTION, SOLUTION INTRAMUSCULAR; INTRAVENOUS AS NEEDED
Status: DISCONTINUED | OUTPATIENT
Start: 2024-08-29 | End: 2024-08-29 | Stop reason: HOSPADM

## 2024-08-29 RX ORDER — ONDANSETRON HYDROCHLORIDE 2 MG/ML
4 INJECTION, SOLUTION INTRAVENOUS ONCE AS NEEDED
Status: DISCONTINUED | OUTPATIENT
Start: 2024-08-29 | End: 2024-08-29 | Stop reason: HOSPADM

## 2024-08-29 RX ORDER — SODIUM CHLORIDE, SODIUM LACTATE, POTASSIUM CHLORIDE, CALCIUM CHLORIDE 600; 310; 30; 20 MG/100ML; MG/100ML; MG/100ML; MG/100ML
100 INJECTION, SOLUTION INTRAVENOUS CONTINUOUS
Status: DISCONTINUED | OUTPATIENT
Start: 2024-08-29 | End: 2024-08-29 | Stop reason: HOSPADM

## 2024-08-29 RX ORDER — SODIUM CHLORIDE 9 MG/ML
50 INJECTION, SOLUTION INTRAVENOUS CONTINUOUS
Status: DISCONTINUED | OUTPATIENT
Start: 2024-08-29 | End: 2024-08-29 | Stop reason: HOSPADM

## 2024-08-29 RX ORDER — LIDOCAINE HYDROCHLORIDE 10 MG/ML
0.1 INJECTION, SOLUTION EPIDURAL; INFILTRATION; INTRACAUDAL; PERINEURAL ONCE
Status: DISCONTINUED | OUTPATIENT
Start: 2024-08-29 | End: 2024-08-29 | Stop reason: HOSPADM

## 2024-08-29 RX ORDER — FENTANYL CITRATE 50 UG/ML
INJECTION, SOLUTION INTRAMUSCULAR; INTRAVENOUS AS NEEDED
Status: DISCONTINUED | OUTPATIENT
Start: 2024-08-29 | End: 2024-08-29 | Stop reason: HOSPADM

## 2024-08-29 ASSESSMENT — COLUMBIA-SUICIDE SEVERITY RATING SCALE - C-SSRS
2. HAVE YOU ACTUALLY HAD ANY THOUGHTS OF KILLING YOURSELF?: NO
1. IN THE PAST MONTH, HAVE YOU WISHED YOU WERE DEAD OR WISHED YOU COULD GO TO SLEEP AND NOT WAKE UP?: NO
6. HAVE YOU EVER DONE ANYTHING, STARTED TO DO ANYTHING, OR PREPARED TO DO ANYTHING TO END YOUR LIFE?: NO

## 2024-08-29 ASSESSMENT — PAIN SCALES - GENERAL
PAINLEVEL_OUTOF10: 0 - NO PAIN

## 2024-08-29 ASSESSMENT — PAIN - FUNCTIONAL ASSESSMENT
PAIN_FUNCTIONAL_ASSESSMENT: UNABLE TO SELF-REPORT
PAIN_FUNCTIONAL_ASSESSMENT: 0-10
PAIN_FUNCTIONAL_ASSESSMENT: 0-10

## 2024-08-29 NOTE — Clinical Note
Patient Clipped and Prepped: left subclavian. Prepped with ChloraPrep, a minimum of 3 minute dry time, longer if needed, no pooling noted, patient draped in sterile fashion.

## 2024-08-29 NOTE — DISCHARGE INSTRUCTIONS
DISCHARGE INSTRUCTIONS FOR PACEMAKER/ICD    DO NOT drive a car for 24 hrs.  DO NOT operate power equipment for 24 hrs.  DO NOT sign any legal documents for 24 hrs.  DO NOT drink alcohol for 24 hrs.    DO NOT remove the dressing.  Keep the dressing clean and dry.  May apply ice to the wound intermittently 20 mins on & 20 mins off.  May shower in 24 hrs.   Avoid letting the water directly hit the wound.    CALL YOUR PHYSICIAN IMMEDIATELY FOR:  Wound edges that are gaping.  Wound that is red, swollen, painful or has foul smelling drainage.  Excessive bright bleeding.  Increased pain not controlled by medication.  Chills/fever over 100 degrees F.    ACTIVITY  DO NOT lift your affected arm above shoulder level(over your head).  Wear a sling to your affected arm for 48 hrs.  NO heavy lifting.  NO push/pulling activity.    Take your medications as directed by your physician.    Follow up in the office in 1week for a wound check/dressing removal.

## 2024-08-29 NOTE — Clinical Note
Patient Clipped and Prepped: left subclavian. Prepped with ChloraPrep, a minimum of 3 minute dry time, longer if needed, no pooling noted, patient draped in sterile fashion and Betadine and draped in sterile fashion.

## 2024-08-30 LAB
ATRIAL RATE: 60 BPM
P AXIS: 36 DEGREES
P OFFSET: 201 MS
P ONSET: 153 MS
PR INTERVAL: 190 MS
Q ONSET: 225 MS
QRS COUNT: 10 BEATS
QRS DURATION: 86 MS
QT INTERVAL: 398 MS
QTC CALCULATION(BAZETT): 398 MS
QTC FREDERICIA: 398 MS
R AXIS: 35 DEGREES
T AXIS: 86 DEGREES
T OFFSET: 424 MS
VENTRICULAR RATE: 60 BPM

## 2024-09-04 ENCOUNTER — APPOINTMENT (OUTPATIENT)
Dept: ENDOCRINOLOGY | Facility: CLINIC | Age: 69
End: 2024-09-04
Payer: MEDICARE

## 2024-09-04 ENCOUNTER — HOSPITAL ENCOUNTER (OUTPATIENT)
Dept: RADIOLOGY | Facility: HOSPITAL | Age: 69
Discharge: HOME | End: 2024-09-04
Payer: MEDICARE

## 2024-09-04 VITALS
DIASTOLIC BLOOD PRESSURE: 93 MMHG | HEIGHT: 68 IN | WEIGHT: 246 LBS | SYSTOLIC BLOOD PRESSURE: 151 MMHG | BODY MASS INDEX: 37.28 KG/M2

## 2024-09-04 DIAGNOSIS — E03.9 HYPOTHYROIDISM, ADULT: ICD-10-CM

## 2024-09-04 DIAGNOSIS — E78.5 DYSLIPIDEMIA: ICD-10-CM

## 2024-09-04 DIAGNOSIS — E11.59 TYPE 2 DIABETES MELLITUS WITH OTHER CIRCULATORY COMPLICATION, WITHOUT LONG-TERM CURRENT USE OF INSULIN (MULTI): Primary | ICD-10-CM

## 2024-09-04 DIAGNOSIS — E78.2 MIXED HYPERLIPIDEMIA: ICD-10-CM

## 2024-09-04 DIAGNOSIS — R91.8 LUNG NODULES: ICD-10-CM

## 2024-09-04 LAB
POC FINGERSTICK BLOOD GLUCOSE: 123 MG/DL (ref 70–100)
POC HEMOGLOBIN A1C: 7.2 % (ref 4.2–6.5)

## 2024-09-04 PROCEDURE — 3008F BODY MASS INDEX DOCD: CPT | Performed by: HOSPITALIST

## 2024-09-04 PROCEDURE — 4010F ACE/ARB THERAPY RXD/TAKEN: CPT | Performed by: HOSPITALIST

## 2024-09-04 PROCEDURE — 71250 CT THORAX DX C-: CPT

## 2024-09-04 PROCEDURE — 83036 HEMOGLOBIN GLYCOSYLATED A1C: CPT | Performed by: HOSPITALIST

## 2024-09-04 PROCEDURE — 3048F LDL-C <100 MG/DL: CPT | Performed by: HOSPITALIST

## 2024-09-04 PROCEDURE — 71250 CT THORAX DX C-: CPT | Performed by: RADIOLOGY

## 2024-09-04 PROCEDURE — 99214 OFFICE O/P EST MOD 30 MIN: CPT | Performed by: HOSPITALIST

## 2024-09-04 PROCEDURE — 3060F POS MICROALBUMINURIA REV: CPT | Performed by: HOSPITALIST

## 2024-09-04 PROCEDURE — 82962 GLUCOSE BLOOD TEST: CPT | Performed by: HOSPITALIST

## 2024-09-04 PROCEDURE — 1159F MED LIST DOCD IN RCRD: CPT | Performed by: HOSPITALIST

## 2024-09-04 PROCEDURE — 3077F SYST BP >= 140 MM HG: CPT | Performed by: HOSPITALIST

## 2024-09-04 PROCEDURE — 3080F DIAST BP >= 90 MM HG: CPT | Performed by: HOSPITALIST

## 2024-09-04 ASSESSMENT — ENCOUNTER SYMPTOMS
EYE ITCHING: 0
DYSURIA: 0
PALPITATIONS: 0
CONSTIPATION: 0
ARTHRALGIAS: 0
CHEST TIGHTNESS: 0
NERVOUS/ANXIOUS: 0
ABDOMINAL PAIN: 0
TROUBLE SWALLOWING: 0
ABDOMINAL DISTENTION: 0
VOICE CHANGE: 0
VOMITING: 0
FREQUENCY: 0
DIARRHEA: 0
SLEEP DISTURBANCE: 0
PHOTOPHOBIA: 0
HEADACHES: 0
NAUSEA: 0
LIGHT-HEADEDNESS: 0
SORE THROAT: 0
SHORTNESS OF BREATH: 0
TREMORS: 0
AGITATION: 0

## 2024-09-04 NOTE — PROGRESS NOTES
Subjective   Patient ID: Silvio Noel is a 69 y.o. male who presents for Diabetes (Dx DM: 2013/PCP: [facility]on/Podiatry: does not see one /Eye exam: yearly, 4/2024 /Patient testing glucose once daily. Did not bring meter/5/3/2024 hga1c 6.5%), Hypothyroidism (Current regimen: 175- 1 tab mon- fri 1/2 tab sat and none on sun /Takes correctly most of the time), Vitamin D Deficiency (vitamin D3 over-the-counter 4000 units daily), and Abnormal Calcium (no family history of hypercalcemia or hyperparathyroidism/Lab Results/     Component                Value               Date                /     CALCIUM                  10.2                08/29/2024          /     PHOS                     3.1                 01/30/2024           ).  Lab Results   Component Value Date    HGBA1C 7.2 (A) 09/04/2024      HPI  See assessment and plan  Review of Systems   HENT:  Negative for sore throat, trouble swallowing and voice change.    Eyes:  Negative for photophobia, itching and visual disturbance.   Respiratory:  Negative for chest tightness and shortness of breath.    Cardiovascular:  Negative for chest pain and palpitations.   Gastrointestinal:  Negative for abdominal distention, abdominal pain, constipation, diarrhea, nausea and vomiting.   Endocrine: Negative for cold intolerance, heat intolerance and polyuria.   Genitourinary:  Negative for dysuria and frequency.   Musculoskeletal:  Negative for arthralgias.   Skin:  Negative for pallor.   Allergic/Immunologic: Negative for environmental allergies.   Neurological:  Negative for tremors, light-headedness and headaches.   Psychiatric/Behavioral:  Negative for agitation and sleep disturbance. The patient is not nervous/anxious.        Objective   Physical Exam  Constitutional:       Appearance: Normal appearance.   HENT:      Head: Normocephalic.      Nose: Nose normal.      Mouth/Throat:      Mouth: Mucous membranes are moist.   Eyes:      Extraocular Movements: Extraocular movements  "intact.   Cardiovascular:      Rate and Rhythm: Normal rate.   Pulmonary:      Effort: Pulmonary effort is normal. No respiratory distress.   Abdominal:      General: There is no distension.   Musculoskeletal:         General: Normal range of motion.      Cervical back: Normal range of motion and neck supple.   Skin:     General: Skin is warm and dry.   Neurological:      Mental Status: He is alert and oriented to person, place, and time.   Psychiatric:         Mood and Affect: Mood normal.      Visit Vitals  BP (!) 151/93   Ht 1.727 m (5' 8\")   Wt 112 kg (246 lb)   BMI 37.40 kg/m²   Smoking Status Every Day   BSA 2.32 m²        Assessment/Plan   Diagnoses and all orders for this visit:  Type 2 diabetes mellitus with other circulatory complication, without long-term current use of insulin (Multi)  -     POCT glycosylated hemoglobin (Hb A1C) manually resulted  -     POCT glucose manually resulted  Mixed hyperlipidemia  Dyslipidemia  Hypothyroidism, adult          # T 2DM around goal     Current regimen :   Synjardy 12.5/1000mg 2 tabs daily  Trulicity 3 mg weekly  pioglitazone 45mg daily,    Restarted taking basaglar 0-0-0-20 ( started 2023)     He mentions insurance would not cover Trulicity and Synjardy anymore     Checking BG once morning -120s ,   denies any hypoglycemia symptoms        Interval history: HE retired August 2, 2024    Plan:   No more ibuprofen/Aleve/Motrin  Drink at least 64 ounce of water a day    STOP POP   Follow kidney function with Dr. Jernigan   Do blood work in 1 week. I have it ordered in system    Once you finish Synjardy start metformin (call us and I will prescribe it) and Brenzavvy   Please see this link to get it cheaper:     [email or web address]    Ask your insurance if they will cover any alternative for Synjardy or Trulicity as given names to you today.    Once he finishes Trulicity we can start glipizide 5 mg 1 tablet twice a day before " food  Continue same insulin and pioglitazone.  If blood sugar less than 90 consistently change the insulin dose to 10 units.    - advised to check BG 3 times a day and bring glucometer next visit   - if plan for GC injection advised to call me   -BP above goal, on ARB  -creatinine normal   on statin. LDL at goal  -eye exam due   -negative microalbumin  -Pt instructed to call office with any hypoglycemia, hyperglycemia, or any other concerns.     Vitamin D deficiency:    vitamin D3 over-the-counter 4000 units daily    # Hypercalcemia: Mild hypocalcemia which is not new recent blood work shows calcium 10.2  He denies history of kidney stones, no family history of hypercalcemia or hyperparathyroidism  No known osteoporosis or kidney disease  Did had CT lung with some nodules followed with primary care physician   pTH low , PTHrp normal    Will get ACE levels   Will repeat PTH, CMP again   Lower dose vit D 3 - 4k daily      Hypothyroidism:TSH was 0. 4  clinically euthyroid    175- 1 tabdaily         # worse Cr recently - stop nsaids, repeat RFP, increase hydration   Follow PCP     RTC 6m      SH- works with [employer], works from home , has 3 kid and 3 step kids, 15 GKs ,  TWIN GK CAME IN  AUG 2023 .      HE retired August 2, 2024   He is into music

## 2024-09-04 NOTE — PATIENT INSTRUCTIONS
No more ibuprofen/Aleve/Motrin  Drink at least 64 ounce of water a day    STOP POP   Follow kidney function with Dr. Jernigan   Do blood work in 1 week. I have it ordered in system    Once you finish Synjardy start metformin (call us and I will prescribe it) and Brenzavvy   Please see this link to get it cheaper:     [email or web address]    Ask your insurance if they will cover any alternative for Synjardy or Trulicity as given names to you today.    Once he finishes Trulicity we can start glipizide 5 mg 1 tablet twice a day before food  Continue same insulin and pioglitazone.  If blood sugar less than 90 consistently change the insulin dose to 10 units.

## 2024-09-05 NOTE — RESULT ENCOUNTER NOTE
Can we let the patient know his CT scan did show some emphysema his nodules are stable they recommend repeating in 1 year  He does have extensive coronary calcification which I believe he is aware of and he is to continue to see cardiology as scheduled

## 2024-09-06 ENCOUNTER — APPOINTMENT (OUTPATIENT)
Dept: PRIMARY CARE | Facility: CLINIC | Age: 69
End: 2024-09-06
Payer: MEDICARE

## 2024-09-06 VITALS
BODY MASS INDEX: 36.95 KG/M2 | DIASTOLIC BLOOD PRESSURE: 79 MMHG | WEIGHT: 243 LBS | TEMPERATURE: 97 F | HEART RATE: 69 BPM | SYSTOLIC BLOOD PRESSURE: 132 MMHG

## 2024-09-06 DIAGNOSIS — Z95.810 ICD (IMPLANTABLE CARDIOVERTER-DEFIBRILLATOR) IN PLACE: ICD-10-CM

## 2024-09-06 NOTE — PROGRESS NOTES
Patient present for wound check. Patient had generator replacement on 8/29/24 with Dr Sanchez for battery depletion.     Bandage was removed, incision healing well, edges are well approximated, no ecchymosis present, no signs or symptoms of infection.

## 2024-09-18 DIAGNOSIS — N52.9 ERECTILE DYSFUNCTION, UNSPECIFIED ERECTILE DYSFUNCTION TYPE: ICD-10-CM

## 2024-09-18 DIAGNOSIS — N40.1 BENIGN PROSTATIC HYPERPLASIA WITH LOWER URINARY TRACT SYMPTOMS, SYMPTOM DETAILS UNSPECIFIED: ICD-10-CM

## 2024-09-26 RX ORDER — TADALAFIL 5 MG/1
5 TABLET ORAL DAILY
Qty: 90 TABLET | Refills: 0 | Status: SHIPPED | OUTPATIENT
Start: 2024-09-26

## 2024-09-27 ENCOUNTER — APPOINTMENT (OUTPATIENT)
Dept: CARDIOLOGY | Facility: CLINIC | Age: 69
End: 2024-09-27
Payer: MEDICARE

## 2024-10-02 ENCOUNTER — LAB (OUTPATIENT)
Dept: LAB | Facility: LAB | Age: 69
End: 2024-10-02
Payer: MEDICARE

## 2024-10-02 DIAGNOSIS — E83.52 HYPERCALCEMIA: ICD-10-CM

## 2024-10-02 LAB
ALBUMIN SERPL BCP-MCNC: 4.7 G/DL (ref 3.4–5)
ALP SERPL-CCNC: 36 U/L (ref 33–136)
ALT SERPL W P-5'-P-CCNC: 20 U/L (ref 10–52)
ANION GAP SERPL CALC-SCNC: 14 MMOL/L (ref 10–20)
AST SERPL W P-5'-P-CCNC: 17 U/L (ref 9–39)
BILIRUB SERPL-MCNC: 0.5 MG/DL (ref 0–1.2)
BUN SERPL-MCNC: 22 MG/DL (ref 6–23)
CA-I BLD-SCNC: 1.42 MMOL/L (ref 1.1–1.33)
CALCIUM SERPL-MCNC: 11.1 MG/DL (ref 8.6–10.6)
CHLORIDE SERPL-SCNC: 103 MMOL/L (ref 98–107)
CO2 SERPL-SCNC: 24 MMOL/L (ref 21–32)
CREAT SERPL-MCNC: 1.26 MG/DL (ref 0.5–1.3)
EGFRCR SERPLBLD CKD-EPI 2021: 63 ML/MIN/1.73M*2
GLUCOSE SERPL-MCNC: 148 MG/DL (ref 74–99)
POTASSIUM SERPL-SCNC: 4.2 MMOL/L (ref 3.5–5.3)
PROT SERPL-MCNC: 7 G/DL (ref 6.4–8.2)
SODIUM SERPL-SCNC: 137 MMOL/L (ref 136–145)

## 2024-10-02 PROCEDURE — 83970 ASSAY OF PARATHORMONE: CPT

## 2024-10-02 PROCEDURE — 80053 COMPREHEN METABOLIC PANEL: CPT

## 2024-10-02 PROCEDURE — 82330 ASSAY OF CALCIUM: CPT

## 2024-10-02 PROCEDURE — 82164 ANGIOTENSIN I ENZYME TEST: CPT

## 2024-10-02 PROCEDURE — 36415 COLL VENOUS BLD VENIPUNCTURE: CPT

## 2024-10-03 DIAGNOSIS — E03.9 HYPOTHYROIDISM, ADULT: ICD-10-CM

## 2024-10-03 DIAGNOSIS — E78.5 DYSLIPIDEMIA: Primary | ICD-10-CM

## 2024-10-03 DIAGNOSIS — E11.69 TYPE 2 DIABETES MELLITUS WITH OTHER SPECIFIED COMPLICATION, UNSPECIFIED WHETHER LONG TERM INSULIN USE (MULTI): ICD-10-CM

## 2024-10-03 DIAGNOSIS — K21.9 GASTROESOPHAGEAL REFLUX DISEASE WITHOUT ESOPHAGITIS: ICD-10-CM

## 2024-10-03 LAB — PTH-INTACT SERPL-MCNC: 13.5 PG/ML (ref 18.5–88)

## 2024-10-03 RX ORDER — OMEPRAZOLE 40 MG/1
40 CAPSULE, DELAYED RELEASE ORAL DAILY
Qty: 90 CAPSULE | Refills: 2 | Status: SHIPPED | OUTPATIENT
Start: 2024-10-03

## 2024-10-03 RX ORDER — PIOGLITAZONEHYDROCHLORIDE 45 MG/1
45 TABLET ORAL DAILY
Qty: 90 TABLET | Refills: 0 | Status: SHIPPED | OUTPATIENT
Start: 2024-10-03

## 2024-10-03 RX ORDER — ROSUVASTATIN CALCIUM 40 MG/1
40 TABLET, COATED ORAL DAILY
Qty: 90 TABLET | Refills: 0 | Status: SHIPPED | OUTPATIENT
Start: 2024-10-03

## 2024-10-03 RX ORDER — LEVOTHYROXINE SODIUM 175 UG/1
175 TABLET ORAL DAILY
Qty: 90 TABLET | Refills: 0 | Status: SHIPPED | OUTPATIENT
Start: 2024-10-03

## 2024-10-04 LAB — ACE SERPL-CCNC: 29 U/L (ref 16–85)

## 2024-10-10 ENCOUNTER — HOSPITAL ENCOUNTER (OUTPATIENT)
Dept: CARDIOLOGY | Facility: HOSPITAL | Age: 69
Discharge: HOME | End: 2024-10-10
Payer: MEDICARE

## 2024-10-10 DIAGNOSIS — I47.29 VENTRICULAR TACHYCARDIA (PAROXYSMAL) (MULTI): ICD-10-CM

## 2024-10-10 DIAGNOSIS — Z95.810 ICD (IMPLANTABLE CARDIOVERTER-DEFIBRILLATOR) IN PLACE: ICD-10-CM

## 2024-10-11 ENCOUNTER — TELEPHONE (OUTPATIENT)
Dept: ENDOCRINOLOGY | Facility: CLINIC | Age: 69
End: 2024-10-11

## 2024-10-11 ENCOUNTER — APPOINTMENT (OUTPATIENT)
Dept: ENDOCRINOLOGY | Facility: CLINIC | Age: 69
End: 2024-10-11
Payer: MEDICARE

## 2024-10-11 DIAGNOSIS — E11.9 TYPE 2 DIABETES MELLITUS WITHOUT COMPLICATION, WITH LONG-TERM CURRENT USE OF INSULIN (MULTI): Primary | ICD-10-CM

## 2024-10-11 DIAGNOSIS — E83.52 HYPERCALCEMIA: ICD-10-CM

## 2024-10-11 DIAGNOSIS — Z79.4 TYPE 2 DIABETES MELLITUS WITHOUT COMPLICATION, WITH LONG-TERM CURRENT USE OF INSULIN (MULTI): Primary | ICD-10-CM

## 2024-10-11 DIAGNOSIS — E78.5 DYSLIPIDEMIA: ICD-10-CM

## 2024-10-11 PROCEDURE — 4010F ACE/ARB THERAPY RXD/TAKEN: CPT | Performed by: HOSPITALIST

## 2024-10-11 PROCEDURE — 3060F POS MICROALBUMINURIA REV: CPT | Performed by: HOSPITALIST

## 2024-10-11 PROCEDURE — 99214 OFFICE O/P EST MOD 30 MIN: CPT | Performed by: HOSPITALIST

## 2024-10-11 PROCEDURE — 3048F LDL-C <100 MG/DL: CPT | Performed by: HOSPITALIST

## 2024-10-11 RX ORDER — GLIPIZIDE 5 MG/1
5 TABLET ORAL
Qty: 180 TABLET | Refills: 3 | Status: SHIPPED | OUTPATIENT
Start: 2024-10-11 | End: 2025-10-11

## 2024-10-11 RX ORDER — METFORMIN HYDROCHLORIDE 500 MG/1
1000 TABLET, EXTENDED RELEASE ORAL 2 TIMES DAILY
Qty: 360 TABLET | Refills: 3 | Status: SHIPPED | OUTPATIENT
Start: 2024-10-11 | End: 2025-10-11

## 2024-10-11 ASSESSMENT — ENCOUNTER SYMPTOMS
CHEST TIGHTNESS: 0
TROUBLE SWALLOWING: 0
CONSTITUTIONAL NEGATIVE: 1
SORE THROAT: 0
HEADACHES: 0
SLEEP DISTURBANCE: 0
LIGHT-HEADEDNESS: 0
PHOTOPHOBIA: 0
ABDOMINAL DISTENTION: 0
SHORTNESS OF BREATH: 0
CONSTIPATION: 0
NERVOUS/ANXIOUS: 0
ARTHRALGIAS: 0
EYE ITCHING: 0
FREQUENCY: 0
PALPITATIONS: 0
DYSURIA: 0
ABDOMINAL PAIN: 0
VOICE CHANGE: 0
DIARRHEA: 0
TREMORS: 0
VOMITING: 0
NAUSEA: 0
AGITATION: 0

## 2024-10-11 NOTE — TELEPHONE ENCOUNTER
Pt was in touch with pharm in regards from switching from Synjardy.  Requesting a Rx for Brenzavvy 20 mg

## 2024-10-11 NOTE — PROGRESS NOTES
Subjective   Patient ID: Silvio Noel is a 69 y.o. male who presents for Diabetes (VIRTUAL VISIT::: /Dx DM: 2013/PCP: [facility]/Podiatry: does not see one /Eye exam: yearly, 4/2024 /Patient testing glucose once daily.), Hypothyroidism (Current regimen: 175- 1 tab mon- fri 1/2 tab sat and none on sun /Takes correctly most of the time), Vitamin D Deficiency (vitamin D3 over-the-counter 4000 units daily), and Abnormal Calcium (no family history of hypercalcemia or hyperparathyroidism).  Lab Results   Component Value Date    HGBA1C 7.2 (A) 09/04/2024      Lab Results   Component Value Date    TSH 0.48 05/03/2024      Lab Results   Component Value Date    CALCIUM 11.1 (H) 10/02/2024    PHOS 3.1 01/30/2024      HPI  See AP     Review of Systems   Constitutional: Negative.    HENT:  Negative for sore throat, trouble swallowing and voice change.    Eyes:  Negative for photophobia, itching and visual disturbance.   Respiratory:  Negative for chest tightness and shortness of breath.    Cardiovascular:  Negative for chest pain and palpitations.   Gastrointestinal:  Negative for abdominal distention, abdominal pain, constipation, diarrhea, nausea and vomiting.   Endocrine: Negative for cold intolerance, heat intolerance and polyuria.   Genitourinary:  Negative for dysuria and frequency.   Musculoskeletal:  Negative for arthralgias.   Skin:  Negative for pallor.   Allergic/Immunologic: Negative for environmental allergies.   Neurological:  Negative for tremors, light-headedness and headaches.   Psychiatric/Behavioral:  Negative for agitation and sleep disturbance. The patient is not nervous/anxious.        Objective   Physical Exam NO Vitals due to virtual visit   NAD    AAOx3    AAOx3   MMM    EOM nml     Assessment/Plan   Diagnoses and all orders for this visit:  Type 2 diabetes mellitus without complication, with long-term current use of insulin (Multi)  -     metFORMIN XR (Glucophage-XR) 500 mg 24 hr tablet; Take 2 tablets  (1,000 mg) by mouth 2 times a day. With food  -     glipiZIDE (Glucotrol) 5 mg tablet; Take 1 tablet (5 mg) by mouth 2 times a day before meals. 1 week after stopping trulicity. Skip if not eating  Hypercalcemia  -     Serum Protein Electrophoresis; Future  -     Urine Protein Electrophoresis (Random); Future  Dyslipidemia       # T 2DM around goal     Current regimen :   Synjardy 12.5/1000mg 2 tabs daily ( stopped 1 week ago)   Trulicity 3 mg weekly  pioglitazone 45mg daily,    Restarted taking basaglar 0-0-0-25( started 2023)     He mentions insurance would not cover Trulicity and Synjardy anymore     Checking BG once morning -120s ,   denies any hypoglycemia symptoms        Interval history: HE retired August 2, 2024    Plan:     Start glipizide 5 mg BID 1 week after stopping trulicity ( he has 2 pen)   Discussed [facility] ind etail   Continue  pioglitazone    When done with synjardy change to metformin 500 mg 2 tab BID + Brenzavvy ( if he can afford ~ 49 $ per month )    If sugar > 150 in morning 30 units insulin at night    Call in 1 week with sugar readings morning and bedtime     No more ibuprofen/Aleve/Motrin  Drink at least 64 ounce of water a day    Follow kidney function with Dr. Delon Nugent link: [email or web address]      - if plan for GC injection advised to call me   -BP above goal, on ARB  -creatinine normal   on statin. LDL at goal  -eye exam due   -negative microalbumin  -Pt instructed to call office with any hypoglycemia, hyperglycemia, or any other concerns.     Vitamin D deficiency:    vitamin D3 over-the-counter 4000 units daily    # PTH independent Hypercalcemia: Mild hypocalcemia which is not new recent blood work shows calcium 10.2  He denies history of kidney stones, no family history of hypercalcemia or hyperparathyroidism  No known osteoporosis or kidney disease  Did had CT lung with some nodules followed with primary care physician   pTH low , PTHrp  normal ACE nml   Will check SPEP and UPEP   If normal then might need PCP/ oncology follow up   He stopped smoking recently      Hypothyroidism:TSH was 0. 48 in 5/ 2024   clinically euthyroid    175- 1 tab daily         RTC 6m      SH- works with [employer], works from home , has 3 kid and 3 step kids, 15 GKs ,  TWIN GK CAME IN  AUG 2023 .      HE retired August 2, 2024   He is into [employer]

## 2024-10-11 NOTE — TELEPHONE ENCOUNTER
Silvio called on the vm.  He was asking for a presription (which I could not understand).  He wanted to talk to some about where it is being sent.  Please  Call him at [phone].

## 2024-10-14 DIAGNOSIS — E11.9 TYPE 2 DIABETES MELLITUS WITHOUT COMPLICATION, WITH LONG-TERM CURRENT USE OF INSULIN (MULTI): Primary | ICD-10-CM

## 2024-10-14 DIAGNOSIS — Z79.4 TYPE 2 DIABETES MELLITUS WITHOUT COMPLICATION, WITH LONG-TERM CURRENT USE OF INSULIN (MULTI): Primary | ICD-10-CM

## 2024-10-14 RX ORDER — BEXAGLIFLOZIN 20 MG
20 TABLET ORAL DAILY
Qty: 90 TABLET | Refills: 3 | Status: SHIPPED | OUTPATIENT
Start: 2024-10-14 | End: 2024-10-14

## 2024-10-14 RX ORDER — BEXAGLIFLOZIN 20 MG
20 TABLET ORAL DAILY
Qty: 90 TABLET | Refills: 3 | Status: SHIPPED | OUTPATIENT
Start: 2024-10-14 | End: 2024-10-14 | Stop reason: SDUPTHER

## 2024-10-14 RX ORDER — BEXAGLIFLOZIN 20 MG
20 TABLET ORAL DAILY
Qty: 90 TABLET | Refills: 3 | Status: SHIPPED | OUTPATIENT
Start: 2024-10-14

## 2024-10-15 ENCOUNTER — LAB (OUTPATIENT)
Dept: LAB | Facility: LAB | Age: 69
End: 2024-10-15

## 2024-10-15 DIAGNOSIS — E83.52 HYPERCALCEMIA: ICD-10-CM

## 2024-10-15 LAB
PROT SERPL-MCNC: 6.4 G/DL (ref 6.4–8.2)
PROT UR-ACNC: 17 MG/DL (ref 5–25)

## 2024-10-15 PROCEDURE — 84156 ASSAY OF PROTEIN URINE: CPT

## 2024-10-15 PROCEDURE — 84166 PROTEIN E-PHORESIS/URINE/CSF: CPT

## 2024-10-15 PROCEDURE — 84165 PROTEIN E-PHORESIS SERUM: CPT

## 2024-10-15 PROCEDURE — 84155 ASSAY OF PROTEIN SERUM: CPT

## 2024-10-15 PROCEDURE — 36415 COLL VENOUS BLD VENIPUNCTURE: CPT

## 2024-10-16 LAB
ALBUMIN: 4 G/DL (ref 3.4–5)
ALPHA 1 GLOBULIN: 0.3 G/DL (ref 0.2–0.6)
ALPHA 2 GLOBULIN: 0.7 G/DL (ref 0.4–1.1)
BETA GLOBULIN: 0.8 G/DL (ref 0.5–1.2)
GAMMA GLOBULIN: 0.7 G/DL (ref 0.5–1.4)
PATH REVIEW-SERUM PROTEIN ELECTROPHORESIS: NORMAL
PROTEIN ELECTROPHORESIS COMMENT: NORMAL

## 2024-10-17 LAB
ALBUMIN MFR UR ELPH: 79.2 %
ALPHA1 GLOB MFR UR ELPH: 5.3 %
ALPHA2 GLOB MFR UR ELPH: 4.7 %
B-GLOBULIN MFR UR ELPH: 7.9 %
GAMMA GLOB MFR UR ELPH: 2.9 %
PATH REVIEW-URINE PROTEIN ELECTROPHORESIS: NORMAL
URINE ELECTROPHORESIS COMMENT: NORMAL

## 2024-10-25 ENCOUNTER — TELEPHONE (OUTPATIENT)
Dept: PRIMARY CARE | Facility: CLINIC | Age: 69
End: 2024-10-25
Payer: MEDICARE

## 2024-10-25 DIAGNOSIS — E78.5 DYSLIPIDEMIA: ICD-10-CM

## 2024-10-25 RX ORDER — ROSUVASTATIN CALCIUM 40 MG/1
40 TABLET, COATED ORAL DAILY
Qty: 90 TABLET | Refills: 0 | Status: SHIPPED | OUTPATIENT
Start: 2024-10-25

## 2024-11-04 ENCOUNTER — APPOINTMENT (OUTPATIENT)
Dept: PRIMARY CARE | Facility: CLINIC | Age: 69
End: 2024-11-04
Payer: MEDICARE

## 2024-11-04 VITALS
DIASTOLIC BLOOD PRESSURE: 87 MMHG | RESPIRATION RATE: 16 BRPM | TEMPERATURE: 98 F | HEART RATE: 63 BPM | SYSTOLIC BLOOD PRESSURE: 137 MMHG | BODY MASS INDEX: 38.37 KG/M2 | OXYGEN SATURATION: 94 % | WEIGHT: 252.38 LBS

## 2024-11-04 DIAGNOSIS — R91.8 LUNG NODULES: ICD-10-CM

## 2024-11-04 DIAGNOSIS — Z79.4 TYPE 2 DIABETES MELLITUS WITHOUT COMPLICATION, WITH LONG-TERM CURRENT USE OF INSULIN (MULTI): ICD-10-CM

## 2024-11-04 DIAGNOSIS — E66.01 OBESITY, MORBID (MULTI): Primary | ICD-10-CM

## 2024-11-04 DIAGNOSIS — E11.9 TYPE 2 DIABETES MELLITUS WITHOUT COMPLICATION, WITH LONG-TERM CURRENT USE OF INSULIN (MULTI): ICD-10-CM

## 2024-11-04 DIAGNOSIS — R00.2 PALPITATIONS: ICD-10-CM

## 2024-11-04 DIAGNOSIS — E83.52 HYPERCALCEMIA: ICD-10-CM

## 2024-11-04 DIAGNOSIS — N18.31 STAGE 3A CHRONIC KIDNEY DISEASE (MULTI): ICD-10-CM

## 2024-11-04 PROCEDURE — 3060F POS MICROALBUMINURIA REV: CPT | Performed by: STUDENT IN AN ORGANIZED HEALTH CARE EDUCATION/TRAINING PROGRAM

## 2024-11-04 PROCEDURE — 3048F LDL-C <100 MG/DL: CPT | Performed by: STUDENT IN AN ORGANIZED HEALTH CARE EDUCATION/TRAINING PROGRAM

## 2024-11-04 PROCEDURE — 1159F MED LIST DOCD IN RCRD: CPT | Performed by: STUDENT IN AN ORGANIZED HEALTH CARE EDUCATION/TRAINING PROGRAM

## 2024-11-04 PROCEDURE — G2211 COMPLEX E/M VISIT ADD ON: HCPCS | Performed by: STUDENT IN AN ORGANIZED HEALTH CARE EDUCATION/TRAINING PROGRAM

## 2024-11-04 PROCEDURE — 3075F SYST BP GE 130 - 139MM HG: CPT | Performed by: STUDENT IN AN ORGANIZED HEALTH CARE EDUCATION/TRAINING PROGRAM

## 2024-11-04 PROCEDURE — 1170F FXNL STATUS ASSESSED: CPT | Performed by: STUDENT IN AN ORGANIZED HEALTH CARE EDUCATION/TRAINING PROGRAM

## 2024-11-04 PROCEDURE — 4004F PT TOBACCO SCREEN RCVD TLK: CPT | Performed by: STUDENT IN AN ORGANIZED HEALTH CARE EDUCATION/TRAINING PROGRAM

## 2024-11-04 PROCEDURE — 3079F DIAST BP 80-89 MM HG: CPT | Performed by: STUDENT IN AN ORGANIZED HEALTH CARE EDUCATION/TRAINING PROGRAM

## 2024-11-04 PROCEDURE — 1160F RVW MEDS BY RX/DR IN RCRD: CPT | Performed by: STUDENT IN AN ORGANIZED HEALTH CARE EDUCATION/TRAINING PROGRAM

## 2024-11-04 PROCEDURE — 4010F ACE/ARB THERAPY RXD/TAKEN: CPT | Performed by: STUDENT IN AN ORGANIZED HEALTH CARE EDUCATION/TRAINING PROGRAM

## 2024-11-04 PROCEDURE — 99214 OFFICE O/P EST MOD 30 MIN: CPT | Performed by: STUDENT IN AN ORGANIZED HEALTH CARE EDUCATION/TRAINING PROGRAM

## 2024-11-04 RX ORDER — BEXAGLIFLOZIN 20 MG
20 TABLET ORAL DAILY
Qty: 90 TABLET | Refills: 3 | Status: SHIPPED | OUTPATIENT
Start: 2024-11-04

## 2024-11-04 ASSESSMENT — ACTIVITIES OF DAILY LIVING (ADL)
DOING_HOUSEWORK: INDEPENDENT
TAKING_MEDICATION: INDEPENDENT
DRESSING: INDEPENDENT
GROCERY_SHOPPING: INDEPENDENT
MANAGING_FINANCES: INDEPENDENT
BATHING: INDEPENDENT

## 2024-11-04 ASSESSMENT — ENCOUNTER SYMPTOMS
VOMITING: 0
FEVER: 0
OCCASIONAL FEELINGS OF UNSTEADINESS: 0
DIZZINESS: 0
NAUSEA: 0
DEPRESSION: 0
SHORTNESS OF BREATH: 0
LOSS OF SENSATION IN FEET: 0
LIGHT-HEADEDNESS: 0

## 2024-11-04 ASSESSMENT — PATIENT HEALTH QUESTIONNAIRE - PHQ9
SUM OF ALL RESPONSES TO PHQ9 QUESTIONS 1 AND 2: 0
2. FEELING DOWN, DEPRESSED OR HOPELESS: NOT AT ALL
1. LITTLE INTEREST OR PLEASURE IN DOING THINGS: NOT AT ALL

## 2024-11-04 NOTE — PROGRESS NOTES
Subjective   Silvio Noel is a 69 y.o. male who presents for Follow-up (Pt here today to F/U from endo).  Patient seen today for follow-up from endocrinology.  He has a history of diabetes and has been working with them for treatment.  He still has elevated sugars and sometimes has difficulty getting medications.  He is working with the endocrinologist for this.  Was also evaluated for elevated PTH, has had persistent hypercalcemia for the past several years.  Has been asymptomatic.  Slightly elevated both ionized and total calcium.  No significant changes with PTH, which is normalized currently within normal limits.    Patient has a history of pulmonary nodule, has had recent low-dose CT scan showing persistent nodules but endocrinology is concerned that this is a cause of the hypercalcemia.        Review of Systems   Constitutional:  Negative for fever.   Respiratory:  Negative for shortness of breath.    Cardiovascular:  Negative for chest pain.   Gastrointestinal:  Negative for nausea and vomiting.   Neurological:  Negative for dizziness and light-headedness.   All other systems reviewed and are negative.      Objective   Physical Exam  Vitals reviewed.   Constitutional:       General: He is not in acute distress.     Appearance: Normal appearance. He is not toxic-appearing.   HENT:      Head: Normocephalic and atraumatic.      Nose: Nose normal.   Eyes:      Extraocular Movements: Extraocular movements intact.   Cardiovascular:      Rate and Rhythm: Normal rate and regular rhythm.      Heart sounds: No murmur heard.     No friction rub. No gallop.   Pulmonary:      Effort: Pulmonary effort is normal. No respiratory distress.      Breath sounds: Normal breath sounds. No wheezing, rhonchi or rales.   Skin:     General: Skin is warm and dry.   Neurological:      General: No focal deficit present.      Mental Status: He is alert.   Psychiatric:         Mood and Affect: Mood normal.         Behavior: Behavior  normal.         Assessment/Plan   Problem List Items Addressed This Visit       Chronic kidney disease, stage 3 (Multi)    Type 2 diabetes mellitus    Relevant Medications    bexagliflozin (Brenzavvy) 20 mg tablet    Obesity, morbid (Multi) - Primary     Other Visit Diagnoses       Lung nodules        Relevant Orders    Referral to Pulmonology    Hypercalcemia        Relevant Orders    Calcium, urine, random    Palpitations        Relevant Orders    Calcium, urine, random    TSH with reflex to Free T4 if abnormal          Patient seen today for follow-up.    Did discuss different causes of palpitations, sweating we will check thyroid level.    Patient is recent calcium, will lung CT so we will not repeat these we will get a urine calcium.    We will refer to pulmonology for further evaluation with the nodule and hypercalcemia.    Follow-up as new concerns arise.

## 2024-11-14 ENCOUNTER — OFFICE VISIT (OUTPATIENT)
Dept: PULMONOLOGY | Facility: CLINIC | Age: 69
End: 2024-11-14
Payer: MEDICARE

## 2024-11-14 VITALS
OXYGEN SATURATION: 98 % | HEIGHT: 68 IN | RESPIRATION RATE: 18 BRPM | SYSTOLIC BLOOD PRESSURE: 131 MMHG | WEIGHT: 248.4 LBS | HEART RATE: 64 BPM | DIASTOLIC BLOOD PRESSURE: 87 MMHG | TEMPERATURE: 96.8 F | BODY MASS INDEX: 37.65 KG/M2

## 2024-11-14 DIAGNOSIS — E83.52 HYPERCALCEMIA: ICD-10-CM

## 2024-11-14 DIAGNOSIS — R05.3 CHRONIC COUGH: Primary | ICD-10-CM

## 2024-11-14 DIAGNOSIS — I42.8 OTHER CARDIOMYOPATHY: ICD-10-CM

## 2024-11-14 DIAGNOSIS — R91.8 LUNG NODULES: ICD-10-CM

## 2024-11-14 PROCEDURE — 3075F SYST BP GE 130 - 139MM HG: CPT | Performed by: INTERNAL MEDICINE

## 2024-11-14 PROCEDURE — 3060F POS MICROALBUMINURIA REV: CPT | Performed by: INTERNAL MEDICINE

## 2024-11-14 PROCEDURE — 3048F LDL-C <100 MG/DL: CPT | Performed by: INTERNAL MEDICINE

## 2024-11-14 PROCEDURE — 99204 OFFICE O/P NEW MOD 45 MIN: CPT | Performed by: INTERNAL MEDICINE

## 2024-11-14 PROCEDURE — 3008F BODY MASS INDEX DOCD: CPT | Performed by: INTERNAL MEDICINE

## 2024-11-14 PROCEDURE — 1159F MED LIST DOCD IN RCRD: CPT | Performed by: INTERNAL MEDICINE

## 2024-11-14 PROCEDURE — 4010F ACE/ARB THERAPY RXD/TAKEN: CPT | Performed by: INTERNAL MEDICINE

## 2024-11-14 PROCEDURE — 1126F AMNT PAIN NOTED NONE PRSNT: CPT | Performed by: INTERNAL MEDICINE

## 2024-11-14 PROCEDURE — 3079F DIAST BP 80-89 MM HG: CPT | Performed by: INTERNAL MEDICINE

## 2024-11-14 ASSESSMENT — PATIENT HEALTH QUESTIONNAIRE - PHQ9
2. FEELING DOWN, DEPRESSED OR HOPELESS: NOT AT ALL
1. LITTLE INTEREST OR PLEASURE IN DOING THINGS: NOT AT ALL
SUM OF ALL RESPONSES TO PHQ9 QUESTIONS 1 AND 2: 0

## 2024-11-14 ASSESSMENT — COLUMBIA-SUICIDE SEVERITY RATING SCALE - C-SSRS
2. HAVE YOU ACTUALLY HAD ANY THOUGHTS OF KILLING YOURSELF?: NO
1. IN THE PAST MONTH, HAVE YOU WISHED YOU WERE DEAD OR WISHED YOU COULD GO TO SLEEP AND NOT WAKE UP?: NO

## 2024-11-14 ASSESSMENT — ASTHMA QUESTIONNAIRES
QUESTION_1 LAST FOUR WEEKS HOW MUCH OF THE TIME DID YOUR ASTHMA KEEP YOU FROM GETTING AS MUCH DONE AT WORK, SCHOOL OR AT HOME: NONE OF THE TIME
QUESTION_4 LAST FOUR WEEKS HOW OFTEN HAVE YOU USED YOUR RESCUE INHALER OR NEBULIZER MEDICATION (SUCH AS ALBUTEROL): NOT AT ALL
ACT_TOTALSCORE: 24
QUESTION_5 LAST FOUR WEEKS HOW WOULD YOU RATE YOUR ASTHMA CONTROL: WELL CONTROLLED
QUESTION_3 LAST FOUR WEEKS HOW OFTEN DID YOUR ASTHMA SYMPTOMS (WHEEZING, COUGHING, SHORTNESS OF BREATH, CHEST TIGHTNESS OR PAIN) WAKE YOU UP AT NIGHT OR EARLIER THAN USUAL IN THE MORNING: NOT AT ALL
QUESTION_2 LAST FOUR WEEKS HOW OFTEN HAVE YOU HAD SHORTNESS OF BREATH: NOT AT ALL

## 2024-11-14 ASSESSMENT — PAIN SCALES - GENERAL: PAINLEVEL_OUTOF10: 0-NO PAIN

## 2024-11-14 ASSESSMENT — ENCOUNTER SYMPTOMS
OCCASIONAL FEELINGS OF UNSTEADINESS: 0
LOSS OF SENSATION IN FEET: 0
DEPRESSION: 0

## 2024-11-18 ENCOUNTER — APPOINTMENT (OUTPATIENT)
Dept: CARDIOLOGY | Facility: CLINIC | Age: 69
End: 2024-11-18
Payer: MEDICARE

## 2024-11-19 DIAGNOSIS — Z95.810 ICD (IMPLANTABLE CARDIOVERTER-DEFIBRILLATOR) IN PLACE: ICD-10-CM

## 2024-11-19 RX ORDER — CLOPIDOGREL BISULFATE 75 MG/1
75 TABLET ORAL DAILY
Qty: 90 TABLET | Refills: 3 | Status: SHIPPED | OUTPATIENT
Start: 2024-11-19

## 2024-11-19 NOTE — TELEPHONE ENCOUNTER
Rx Refill Request Telephone Encounter    Name:  Silvio Noel  :  [id number]  Medication Name:    clopidogrel (Plavix) 75 mg tablet       Specific Pharmacy location:    [facility] [address] - [facility] AT [address] [facility]  [facility], [place] [address]  Phone: [phone]  Fax: [phone]

## 2024-11-29 ENCOUNTER — HOSPITAL ENCOUNTER (OUTPATIENT)
Dept: RADIOLOGY | Facility: HOSPITAL | Age: 69
Discharge: HOME | End: 2024-11-29
Payer: MEDICARE

## 2024-11-29 DIAGNOSIS — I42.8 OTHER CARDIOMYOPATHY: ICD-10-CM

## 2024-11-29 DIAGNOSIS — E83.52 HYPERCALCEMIA: ICD-10-CM

## 2024-11-29 LAB — GLUCOSE BLD MANUAL STRIP-MCNC: 140 MG/DL (ref 74–99)

## 2024-11-29 PROCEDURE — 3430000001 HC RX 343 DIAGNOSTIC RADIOPHARMACEUTICALS: Performed by: STUDENT IN AN ORGANIZED HEALTH CARE EDUCATION/TRAINING PROGRAM

## 2024-11-29 PROCEDURE — 78432 MYOCRD IMG PET 2RTRACER: CPT

## 2024-11-29 PROCEDURE — A9552 F18 FDG: HCPCS | Performed by: STUDENT IN AN ORGANIZED HEALTH CARE EDUCATION/TRAINING PROGRAM

## 2024-11-29 PROCEDURE — A9526 NITROGEN N-13 AMMONIA: HCPCS | Performed by: STUDENT IN AN ORGANIZED HEALTH CARE EDUCATION/TRAINING PROGRAM

## 2024-11-29 PROCEDURE — 82947 ASSAY GLUCOSE BLOOD QUANT: CPT

## 2024-11-29 RX ORDER — AMMONIA N-13 37.5 MCI/ML
13 INJECTION INTRAVENOUS
Status: COMPLETED | OUTPATIENT
Start: 2024-11-29 | End: 2024-11-29

## 2024-11-29 RX ORDER — FLUDEOXYGLUCOSE F 18 200 MCI/ML
12.1 INJECTION, SOLUTION INTRAVENOUS
Status: COMPLETED | OUTPATIENT
Start: 2024-11-29 | End: 2024-11-29

## 2024-12-04 ENCOUNTER — APPOINTMENT (OUTPATIENT)
Dept: ENDOCRINOLOGY | Facility: CLINIC | Age: 69
End: 2024-12-04
Payer: MEDICARE

## 2024-12-04 VITALS
SYSTOLIC BLOOD PRESSURE: 123 MMHG | WEIGHT: 251 LBS | HEIGHT: 68 IN | DIASTOLIC BLOOD PRESSURE: 77 MMHG | BODY MASS INDEX: 38.04 KG/M2

## 2024-12-04 DIAGNOSIS — Z79.4 TYPE 2 DIABETES MELLITUS WITHOUT COMPLICATION, WITH LONG-TERM CURRENT USE OF INSULIN (MULTI): Primary | ICD-10-CM

## 2024-12-04 DIAGNOSIS — E03.9 HYPOTHYROIDISM, ADULT: ICD-10-CM

## 2024-12-04 DIAGNOSIS — E83.52 HYPERCALCEMIA: ICD-10-CM

## 2024-12-04 DIAGNOSIS — E11.9 TYPE 2 DIABETES MELLITUS WITHOUT COMPLICATION, WITH LONG-TERM CURRENT USE OF INSULIN (MULTI): Primary | ICD-10-CM

## 2024-12-04 DIAGNOSIS — E78.2 MIXED HYPERLIPIDEMIA: ICD-10-CM

## 2024-12-04 LAB
POC FINGERSTICK BLOOD GLUCOSE: 160 MG/DL (ref 70–100)
POC HEMOGLOBIN A1C: 7.1 % (ref 4.2–6.5)

## 2024-12-04 PROCEDURE — 3048F LDL-C <100 MG/DL: CPT | Performed by: HOSPITALIST

## 2024-12-04 PROCEDURE — 3078F DIAST BP <80 MM HG: CPT | Performed by: HOSPITALIST

## 2024-12-04 PROCEDURE — 82962 GLUCOSE BLOOD TEST: CPT | Performed by: HOSPITALIST

## 2024-12-04 PROCEDURE — 83036 HEMOGLOBIN GLYCOSYLATED A1C: CPT | Performed by: HOSPITALIST

## 2024-12-04 PROCEDURE — 4010F ACE/ARB THERAPY RXD/TAKEN: CPT | Performed by: HOSPITALIST

## 2024-12-04 PROCEDURE — 3074F SYST BP LT 130 MM HG: CPT | Performed by: HOSPITALIST

## 2024-12-04 PROCEDURE — 99214 OFFICE O/P EST MOD 30 MIN: CPT | Performed by: HOSPITALIST

## 2024-12-04 PROCEDURE — 3008F BODY MASS INDEX DOCD: CPT | Performed by: HOSPITALIST

## 2024-12-04 PROCEDURE — 1159F MED LIST DOCD IN RCRD: CPT | Performed by: HOSPITALIST

## 2024-12-04 PROCEDURE — 3060F POS MICROALBUMINURIA REV: CPT | Performed by: HOSPITALIST

## 2024-12-04 ASSESSMENT — ENCOUNTER SYMPTOMS
SHORTNESS OF BREATH: 0
ARTHRALGIAS: 1
EYE ITCHING: 0
PHOTOPHOBIA: 0
ABDOMINAL DISTENTION: 0
NERVOUS/ANXIOUS: 0
NAUSEA: 0
PALPITATIONS: 0
ABDOMINAL PAIN: 0
SLEEP DISTURBANCE: 0
AGITATION: 0
CHEST TIGHTNESS: 0
FREQUENCY: 0
VOICE CHANGE: 0
TREMORS: 0
HEADACHES: 0
LIGHT-HEADEDNESS: 0
VOMITING: 0
CONSTITUTIONAL NEGATIVE: 1
DIARRHEA: 0
SORE THROAT: 0
DYSURIA: 0
TROUBLE SWALLOWING: 0
CONSTIPATION: 0

## 2024-12-04 NOTE — PROGRESS NOTES
"Subjective   Patient ID: Silvio Noel is a 69 y.o. male who presents for Diabetes (Dx DM: 2013/PCP: Delon/Podiatry: does not see one /Eye exam: yearly, 4/2024 /Patient testing glucose once daily./9/4/2024 hgac 7.2% ), Hypothyroidism (Current regimen: 175- 1 tab daily ), Vitamin D Deficiency (Not taking OTC vit D ), and Abnormal Calcium (no family history of hypercalcemia or hyperparathyroidism).  Lab Results   Component Value Date    HGBA1C 7.1 (A) 12/04/2024      Lab Results   Component Value Date    TSH 0.48 05/03/2024      Lab Results   Component Value Date    CALCIUM 11.1 (H) 10/02/2024    PHOS 3.1 01/30/2024      HPI  See assessment and plan  Review of Systems   Constitutional: Negative.    HENT:  Negative for sore throat, trouble swallowing and voice change.    Eyes:  Negative for photophobia, itching and visual disturbance.   Respiratory:  Negative for chest tightness and shortness of breath.    Cardiovascular:  Negative for chest pain and palpitations.   Gastrointestinal:  Negative for abdominal distention, abdominal pain, constipation, diarrhea, nausea and vomiting.   Endocrine: Negative for cold intolerance, heat intolerance and polyuria.   Genitourinary:  Negative for dysuria and frequency.   Musculoskeletal:  Positive for arthralgias.   Skin:  Negative for pallor.   Allergic/Immunologic: Negative for environmental allergies.   Neurological:  Negative for tremors, light-headedness and headaches.   Psychiatric/Behavioral:  Negative for agitation and sleep disturbance. The patient is not nervous/anxious.        Objective   Physical Exam Visit Vitals  /77   Ht 1.727 m (5' 8\")   Wt 114 kg (251 lb)   BMI 38.16 kg/m²   Smoking Status Former   BSA 2.34 m²        Assessment/Plan   Diagnoses and all orders for this visit:  Type 2 diabetes mellitus without complication, with long-term current use of insulin (Multi)  -     POCT glucose manually resulted  -     POCT glycosylated hemoglobin (Hb A1C) manually " resulted  Hypothyroidism, adult  -     US thyroid; Future  Mixed hyperlipidemia  Hypercalcemia            # T 2DM around goal     Current regimen :   Metformin extended release 500 mg 2 tablet twice a day  Brenzavvy 20 mg daily (getting online [facility]-$50 per month mentions okay to continue)  Glipizide 5 mg 1 tablet twice a day before food  pioglitazone 45mg daily,    Restarted taking basaglar 0-0-0-25( started 2023)     Past medication  Trulicity and Synjardy stopped as insurance is not covering 10/2024-switch to metformin Brenzavvy and glipizide instead  Checking BG once morning -120s ,   denies any hypoglycemia symptoms        Interval history: HE retired August 2, 2024    Plan:    No change in regimen doing well on current regimen  Discussed [facility] ind etail   Continue  pioglitazone    Follow kidney function with Dr. Jernigan   - if plan for GC injection advised to call me   -on ARB  -creatinine normal   on statin. LDL at goal  -eye exam due   -negative microalbumin  -Pt instructed to call office with any hypoglycemia, hyperglycemia, or any other concerns.     Vitamin D deficiency:    vitamin D3 over-the-counter 4000 units daily    # PTH independent Hypercalcemia: Mild hypocalcemia which is not new recent blood work shows calcium 10.2  He denies history of kidney stones, no family history of hypercalcemia or hyperparathyroidism  No known osteoporosis or kidney disease  Did had CT lung with some nodules followed with primary care physician   pTH low , PTHrp normal ACE nml   SPEP UPEP negative.  He underwent PET scan 11/29/2025 which only showed an hypermetabolic thyroid nodule  Check ultrasound thyroid  Unclear cause of hypercalcemia at this time.  Do a low-dose dexamethasone suppression test and check cortisol, DHEA-S next visit  He stopped smoking recently      Hypothyroidism:TSH was 0. 48 in 5/ 2024   clinically euthyroid    175- 1 tab daily         RTC 6m      SH- works with [employer], works from  home , has 3 kid and 3 step kids, 15 GKs ,  TWIN JUSTIN CAME IN  AUG 2023 .      HE retired August 2, 2024   He is into [employer]

## 2024-12-06 ENCOUNTER — HOSPITAL ENCOUNTER (OUTPATIENT)
Dept: RESPIRATORY THERAPY | Facility: HOSPITAL | Age: 69
Discharge: HOME | End: 2024-12-06
Payer: MEDICARE

## 2024-12-06 DIAGNOSIS — R05.3 CHRONIC COUGH: ICD-10-CM

## 2024-12-06 LAB
MGC ASCENT PFT - FEV1 - POST: 3.4
MGC ASCENT PFT - FEV1 - PRE: 2.94
MGC ASCENT PFT - FEV1 - PREDICTED: 2.93
MGC ASCENT PFT - FVC - POST: 4.19
MGC ASCENT PFT - FVC - PRE: 3.8
MGC ASCENT PFT - FVC - PREDICTED: 3.82

## 2024-12-06 PROCEDURE — 94726 PLETHYSMOGRAPHY LUNG VOLUMES: CPT

## 2024-12-13 ENCOUNTER — HOSPITAL ENCOUNTER (OUTPATIENT)
Dept: RADIOLOGY | Facility: CLINIC | Age: 69
Discharge: HOME | End: 2024-12-13
Payer: MEDICARE

## 2024-12-13 DIAGNOSIS — E03.9 HYPOTHYROIDISM, ADULT: ICD-10-CM

## 2024-12-13 PROCEDURE — 76536 US EXAM OF HEAD AND NECK: CPT

## 2024-12-17 DIAGNOSIS — Z79.4 TYPE 2 DIABETES MELLITUS WITHOUT COMPLICATION, WITH LONG-TERM CURRENT USE OF INSULIN (MULTI): ICD-10-CM

## 2024-12-17 DIAGNOSIS — E11.9 TYPE 2 DIABETES MELLITUS WITHOUT COMPLICATION, WITH LONG-TERM CURRENT USE OF INSULIN (MULTI): ICD-10-CM

## 2024-12-17 DIAGNOSIS — E03.9 HYPOTHYROIDISM, ADULT: ICD-10-CM

## 2024-12-18 ENCOUNTER — APPOINTMENT (OUTPATIENT)
Dept: PULMONOLOGY | Facility: CLINIC | Age: 69
End: 2024-12-18
Payer: MEDICARE

## 2024-12-18 DIAGNOSIS — R91.8 LUNG NODULES: Primary | ICD-10-CM

## 2024-12-18 PROCEDURE — 99213 OFFICE O/P EST LOW 20 MIN: CPT | Performed by: INTERNAL MEDICINE

## 2024-12-18 PROCEDURE — 3048F LDL-C <100 MG/DL: CPT | Performed by: INTERNAL MEDICINE

## 2024-12-18 PROCEDURE — 3060F POS MICROALBUMINURIA REV: CPT | Performed by: INTERNAL MEDICINE

## 2024-12-18 PROCEDURE — 4010F ACE/ARB THERAPY RXD/TAKEN: CPT | Performed by: INTERNAL MEDICINE

## 2024-12-23 ENCOUNTER — TELEPHONE (OUTPATIENT)
Dept: ENDOCRINOLOGY | Facility: CLINIC | Age: 69
End: 2024-12-23
Payer: MEDICARE

## 2024-12-23 DIAGNOSIS — E11.59 TYPE 2 DIABETES MELLITUS WITH OTHER CIRCULATORY COMPLICATION, WITHOUT LONG-TERM CURRENT USE OF INSULIN: ICD-10-CM

## 2024-12-23 RX ORDER — INSULIN GLARGINE 100 [IU]/ML
INJECTION, SOLUTION SUBCUTANEOUS
Qty: 30 ML | Refills: 1 | Status: SHIPPED | OUTPATIENT
Start: 2024-12-23

## 2024-12-23 RX ORDER — LANCETS
EACH MISCELLANEOUS
Qty: 200 EACH | Refills: 1 | Status: SHIPPED | OUTPATIENT
Start: 2024-12-23

## 2024-12-26 NOTE — PROGRESS NOTES
Department of Medicine  Division of Pulmonary, Critical Care, and Sleep Medicine  Consultation  [facility] - [address], [address]    I was asked by No ref. provider found, to evaluate Mr. Silvio Noel for lung nodules. I have independently interviewed and examined the patient in the office and reviewed available records.    Physician HPI:  Mr. Noel is a 69-year-old man with past medical hx of CAD (MI at age of 45, complicated by VT and Cardiac arrest, s/p ICD placement), nicotine dependence in remission who presented to the office today regarding lung nodules and hypercalcemia.     The patient denies acute respiratory symptoms. He was found to have sub-cm lung nodules on LDCT that were stable over the past year. He was also noted to have mild hypercalcemia over the past 4 years of unclear etiology. He is following up with Endocrinology. Pulmonary is consulted to evaluate for possible sarcoidosis.     Cardiac records are reviewed today. The patient suffered a cardiac arrest at age 45 due to MI and VT. He is s/p ICD placement. Following up with Cardiology and EP clinics. No recent arrhythmia or ICD shocks reported.     No family hx of sarcoidosis.     Review of Systems   No neuropathy - tingling or numbness   No acute vision changes   No abdominal pain, nausea or vomiting  No skin rashes  No inflammatory arthritis       Follow up 12/18/2024:  Phone visit  PET/CT is reviewed today. No evidence of cardiac sarcoidosis. No clear evidence of active granulomatous inflammation to suggest active sarcoidosis.       Immunizations:  Immunization History   Administered Date(s) Administered    Flu vaccine, quadrivalent, recombinant, preservative free, adult (FLUBLOK) 11/22/2022    Moderna SARS-CoV-2 Vaccination 03/13/2021, 04/13/2021, 12/11/2021       Past Medical History:  Past Medical History:   Diagnosis Date    Atherosclerotic heart disease of native coronary artery without angina pectoris 12/02/2019    Coronary artery  disease without angina pectoris, unspecified vessel or lesion type, unspecified whether native or transplanted heart    Diverticulitis, colon 10/31/2023    HLD (hyperlipidemia)     Ischemic cardiomyopathy     MI (myocardial infarction) (Multi)        Past Surgical History:  Past Surgical History:   Procedure Laterality Date    CARDIAC ELECTROPHYSIOLOGY PROCEDURE Left 2024    Procedure: ICD DC Generator Change;  Surgeon: Stanislaw Sanchez MD;  Location: [facility] Cardiac Cath Lab;  Service: Electrophysiology;  Laterality: Left;  [employer] ICD generator replacement    OTHER SURGICAL HISTORY  2015    Cardioverter-defibrillator Pulse Generator Insertion    OTHER SURGICAL HISTORY  2021    Colonoscopy    OTHER SURGICAL HISTORY  2021    Percutaneous transluminal coronary angioplasty    OTHER SURGICAL HISTORY  2021    Cardiac catheterization    TONSILLECTOMY  2015    Tonsillectomy       Family History:  Family History   Problem Relation Name Age of Onset    Other (malignant neoplasm of breast) Mother      Other (cardiac disorder) Father      Other (ischemic heart disease) Other         Social History:  Social History     Tobacco Use    Smoking status: Former     Current packs/day: 0.00     Types: Cigarettes     Quit date: 2023     Years since quittin.4    Smokeless tobacco: Never   Vaping Use    Vaping status: Never Used   Substance Use Topics    Alcohol use: Yes     Alcohol/week: 10.0 standard drinks of alcohol     Types: 3 Cans of beer, 2 Shots of liquor, 5 Standard drinks or equivalent per week    Drug use: Not Currently        Medications:  Current Outpatient Medications   Medication Instructions    aspirin 81 mg EC tablet 1 tablet, Daily    atenolol (TENORMIN) 25 mg, oral, 2 times daily    bexagliflozin (BRENZAVVY) 20 mg, oral, Daily    clopidogrel (PLAVIX) 75 mg, oral, Daily    cyanocobalamin, vitamin B-12, (VITAMIN B-12 ORAL) Take by mouth.    fenofibrate (TRICOR) 145  "mg, oral, Daily, Take with food.    FLUoxetine (PROZAC) 20 mg, oral, Daily    fluticasone (Flonase) 50 mcg/actuation nasal spray 1 spray, Daily    FreeStyle Lite Strips strip USE TWICE DAILY    glipiZIDE (GLUCOTROL) 5 mg, oral, 2 times daily before meals, 1 week after stopping trulicity. Skip if not eating    insulin glargine (Basaglar KwikPen U-100 Insulin) 100 unit/mL (3 mL) pen Inject 25 units under the skin daily    lancets (Accu-Chek Softclix Lancets) misc Use to test blood sugar twice daily    levothyroxine (SYNTHROID, LEVOXYL) 175 mcg, oral, Daily    losartan (COZAAR) 50 mg, oral, 2 times daily    magnesium oxide (Mag-Ox) 400 mg (241.3 mg magnesium) tablet 1 tablet, Daily    MELOXICAM ORAL 15 mg    metFORMIN XR (GLUCOPHAGE-XR) 1,000 mg, oral, 2 times daily, With food    nitroglycerin (NITROSTAT) 0.4 mg, Every 5 min PRN    omeprazole (PRILOSEC) 40 mg, oral, Daily    pen needle, diabetic 32 gauge x 5/32\" needle 1 Needle, miscellaneous, Daily    pioglitazone (ACTOS) 45 mg, oral, Daily    rosuvastatin (CRESTOR) 40 mg, oral, Daily    tadalafil (CIALIS) 5 mg, oral, Daily    ZINC ORAL 100 mg        Drug Allergies/Intolerances:  Allergies   Allergen Reactions    Codeine Unknown     Blood pressure goes down    Hydrocodone-Acetaminophen Unknown     Blood pressure goes up    Lisinopril Cough            Visit Vitals  Smoking Status Former             Pulmonary Function Test Results       Chest Radiograph     XR chest 1 view 08/29/2024    Narrative  Interpreted By:  Won Shaw,  STUDY:  XR CHEST 1 VIEW;  8/29/2024 1:47 pm    INDICATION:  Signs/Symptoms:S/P ICD system revision.    COMPARISON:  01/04/2019    ACCESSION NUMBER(S):  [id number]    ORDERING CLINICIAN:  ALLIE VERDIN    FINDINGS:  Left upper chest pacemaker is in place. An additional lead is now  seen extending to the right ventricle. The lungs appear clear without  apparent pleural effusion. Cardiomegaly suspected. No congestive  failure. No apparent " pneumothorax.    Impression  No active disease in the chest identified.    MACRO:  None    Signed by: Won Shaw 8/29/2024 1:54 PM  Dictation workstation:   [id number]      Echocardiogram     No results found for this or any previous visit from the past 365 days.       Chest CT Scan     CT lung screening follow up CT chest wo IV contrast 09/04/2024    Narrative  Interpreted By:  Kevin Woodward,  STUDY:  CT LUNG SCREENING FOLLOW UP CT CHEST WO CONTRAST; 9/4/2024 9:51 am    INDICATION:  Signs/Symptoms:follow up testing.    COMPARISON:  CT dated 03/2024    ACCESSION NUMBER(S):  [id number]    ORDERING CLINICIAN:  DULCE SANCHEZ    TECHNIQUE:  Helical data acquisition of the chest was obtained without IV  contrast material.  Images were reformatted in axial, coronal, and  sagittal planes.    FINDINGS:  LUNGS AND AIRWAYS:  The trachea and central airways are patent. No endobronchial lesion  is seen.    There is mild bilateral upper lung predominant centrilobular and  paraseptal emphysema.There is no focal consolidation, pleural  effusion, or pneumothorax.    3 mm right apical nodule, image 65/437, stable.  Stable 3 mm right upper lobe nodule, image 73/437.  Stable 8 mm right lower lobe ground-glass nodule, image 192/437    MEDIASTINUM AND VANESSA, LOWER NECK AND AXILLA:  The visualized thyroid gland is within normal limits.  No evidence of thoracic lymphadenopathy by CT criteria.  Esophagus appears within normal limits as seen.    HEART AND VESSELS:  The thoracic aorta normal in course and caliber.There is mild  scattered calcified atherosclerosis present. Main pulmonary artery  and its branches are normal in caliber. Extensive coronary artery  calcification with suggestion of coronary artery stents. The cardiac  chambers are not enlarged. Areas of calcification in the left  ventricle which may be sequela of prior infarction. There is no  pericardial effusion seen.    UPPER ABDOMEN:  The visualized  subdiaphragmatic structures demonstrate no remarkable  findings.        CHEST WALL AND OSSEOUS STRUCTURES:  Chest wall is within normal limits.  No acute osseous pathology.There are no suspicious osseous lesions.    Impression  1.  Few small bilateral noncalcified pulmonary nodules measuring up  to 8 mm, as described above and stable compared to prior study.  Continued screening with low-dose noncontrast chest CT in 12 months  (from current date) is recommended.  2. Extensive coronary artery calcification with suggestion of  coronary artery stents.    LUNG RADS CATEGORY:  Lung Rad: Lung-RADS 2 (Benign Appearance or Indolent Behavior)    Recommendation: Continue annual screening with Low Dose Chest CT in  12 months, recommended as per American College of Radiology  Guidelines Lung-RADS Version 2022.        **The patient's CAC score was measured with an FDA-cleared AI tool  that correlates well with traditional methods. However, due to the  non-gated CT scan and new algorithm, AI-powered scores should not  replace traditional cardiovascular risk assessment. For further  assistance, refer to the [facility] Cardiovascular Prevention  Program via an [facility] referral to 'Cardiology Prevention Program.'    MACRO:  None    Signed by: Kevin Mayorga 9/5/2024 7:40 AM  Dictation workstation:   [id number]       Laboratory Studies     Lab Results   Component Value Date    WBC 7.4 08/29/2024    HGB 14.2 08/29/2024    HCT 42.8 08/29/2024    MCV 88 08/29/2024     08/29/2024      Lab Results   Component Value Date    GLUCOSE 148 (H) 10/02/2024    CALCIUM 11.1 (H) 10/02/2024     10/02/2024    K 4.2 10/02/2024    CO2 24 10/02/2024     10/02/2024    BUN 22 10/02/2024    CREATININE 1.26 10/02/2024      Lab Results   Component Value Date    ALT 20 10/02/2024    AST 17 10/02/2024    ALKPHOS 36 10/02/2024    BILITOT 0.5 10/02/2024          Assessment and Plan / Recommendations     Lung nodules   Hypercalcemia    Nicotine dependence in remission   CAD with hx of VT s/p ICD placement     CT scan of chest is reviewed today. Lung nodules are sub-cm and have been stable overt the past 6 months. No strong evidence of pulmonary sarcoidosis on CT scan.  Given his cardiac risk factors (severe CAD, previous cardiac arrest), would recommend to obtain a PET/CT scan first prior to consider bronchoscopy (transbronchial biopsy, BAL, CD4/8,..) to evaluate for sarcoidosis.   Follow up after PET scan is done.     Follow up 12/18/2024:  PET/CT results are discussed with Silvio over the phone today. No clear evidence of active sarcoidosis. No further work up is needed at this time from the pulmonary standpoint. F/U PCP for annual LDCT for lung ca screening.     Amanuel Graf MD  12/18/2024

## 2024-12-27 ENCOUNTER — APPOINTMENT (OUTPATIENT)
Dept: CARDIOLOGY | Facility: CLINIC | Age: 69
End: 2024-12-27
Payer: MEDICARE

## 2025-01-01 DIAGNOSIS — E03.9 HYPOTHYROIDISM, ADULT: ICD-10-CM

## 2025-01-01 DIAGNOSIS — F41.9 ANXIETY: ICD-10-CM

## 2025-01-01 DIAGNOSIS — E11.69 TYPE 2 DIABETES MELLITUS WITH OTHER SPECIFIED COMPLICATION, UNSPECIFIED WHETHER LONG TERM INSULIN USE (MULTI): ICD-10-CM

## 2025-01-01 DIAGNOSIS — I10 HTN (HYPERTENSION), BENIGN: ICD-10-CM

## 2025-01-01 DIAGNOSIS — E78.41 ELEVATED LIPOPROTEIN(A): ICD-10-CM

## 2025-01-02 ENCOUNTER — TELEPHONE (OUTPATIENT)
Dept: PRIMARY CARE | Facility: CLINIC | Age: 70
End: 2025-01-02
Payer: MEDICARE

## 2025-01-02 DIAGNOSIS — M25.511 CHRONIC PAIN OF BOTH SHOULDERS: Primary | ICD-10-CM

## 2025-01-02 DIAGNOSIS — G89.29 CHRONIC PAIN OF BOTH SHOULDERS: Primary | ICD-10-CM

## 2025-01-02 DIAGNOSIS — M25.512 CHRONIC PAIN OF BOTH SHOULDERS: Primary | ICD-10-CM

## 2025-01-02 RX ORDER — LEVOTHYROXINE SODIUM 175 UG/1
175 TABLET ORAL DAILY
Qty: 90 TABLET | Refills: 1 | Status: SHIPPED | OUTPATIENT
Start: 2025-01-02

## 2025-01-02 RX ORDER — PIOGLITAZONEHYDROCHLORIDE 45 MG/1
45 TABLET ORAL DAILY
Qty: 90 TABLET | Refills: 1 | Status: SHIPPED | OUTPATIENT
Start: 2025-01-02

## 2025-01-02 RX ORDER — ATENOLOL 50 MG/1
25 TABLET ORAL 2 TIMES DAILY
Qty: 90 TABLET | Refills: 1 | Status: SHIPPED | OUTPATIENT
Start: 2025-01-02

## 2025-01-02 RX ORDER — FLUOXETINE 20 MG/1
20 TABLET ORAL DAILY
Qty: 90 TABLET | Refills: 1 | Status: SHIPPED | OUTPATIENT
Start: 2025-01-02

## 2025-01-03 RX ORDER — BACLOFEN 10 MG/1
10 TABLET ORAL 2 TIMES DAILY
Qty: 60 TABLET | Refills: 5 | Status: SHIPPED | OUTPATIENT
Start: 2025-01-03 | End: 2025-07-02

## 2025-01-06 RX ORDER — FENOFIBRATE 145 MG/1
145 TABLET, FILM COATED ORAL DAILY
Qty: 90 TABLET | Refills: 3 | Status: SHIPPED | OUTPATIENT
Start: 2025-01-06

## 2025-01-09 ENCOUNTER — TELEPHONE (OUTPATIENT)
Dept: ENDOCRINOLOGY | Facility: CLINIC | Age: 70
End: 2025-01-09
Payer: MEDICARE

## 2025-01-09 DIAGNOSIS — E11.59 TYPE 2 DIABETES MELLITUS WITH OTHER CIRCULATORY COMPLICATION, WITHOUT LONG-TERM CURRENT USE OF INSULIN: ICD-10-CM

## 2025-01-09 RX ORDER — PEN NEEDLE, DIABETIC 30 GX3/16"
1 NEEDLE, DISPOSABLE MISCELLANEOUS DAILY
Qty: 100 EACH | Refills: 1 | Status: SHIPPED | OUTPATIENT
Start: 2025-01-09

## 2025-01-09 NOTE — TELEPHONE ENCOUNTER
Alyssa Pharmacy tech from [facility] [phone], called for a refill for this patient BD Paloma 32G 4mm 90 d  supply. Ref No [id number].

## 2025-01-20 ENCOUNTER — APPOINTMENT (OUTPATIENT)
Dept: CARDIOLOGY | Facility: HOSPITAL | Age: 70
End: 2025-01-20
Payer: MEDICARE

## 2025-01-20 ENCOUNTER — HOSPITAL ENCOUNTER (OUTPATIENT)
Dept: CARDIOLOGY | Facility: HOSPITAL | Age: 70
Discharge: HOME | End: 2025-01-20
Payer: MEDICARE

## 2025-01-20 DIAGNOSIS — I47.29 PAROXYSMAL VENTRICULAR TACHYCARDIA (MULTI): ICD-10-CM

## 2025-01-20 DIAGNOSIS — Z95.810 PRESENCE OF AUTOMATIC CARDIOVERTER/DEFIBRILLATOR (AICD): ICD-10-CM

## 2025-01-20 PROCEDURE — 93296 REM INTERROG EVL PM/IDS: CPT

## 2025-02-25 ENCOUNTER — OFFICE VISIT (OUTPATIENT)
Dept: PRIMARY CARE | Facility: CLINIC | Age: 70
End: 2025-02-25
Payer: MEDICARE

## 2025-02-25 VITALS
WEIGHT: 256.6 LBS | SYSTOLIC BLOOD PRESSURE: 107 MMHG | HEIGHT: 68 IN | HEART RATE: 68 BPM | RESPIRATION RATE: 16 BRPM | TEMPERATURE: 97.3 F | OXYGEN SATURATION: 93 % | DIASTOLIC BLOOD PRESSURE: 70 MMHG | BODY MASS INDEX: 38.89 KG/M2

## 2025-02-25 DIAGNOSIS — J43.2 CENTRILOBULAR EMPHYSEMA (MULTI): Primary | ICD-10-CM

## 2025-02-25 DIAGNOSIS — Z72.0 TOBACCO USE: ICD-10-CM

## 2025-02-25 PROCEDURE — 3074F SYST BP LT 130 MM HG: CPT

## 2025-02-25 PROCEDURE — 3008F BODY MASS INDEX DOCD: CPT

## 2025-02-25 PROCEDURE — 1159F MED LIST DOCD IN RCRD: CPT

## 2025-02-25 PROCEDURE — 4010F ACE/ARB THERAPY RXD/TAKEN: CPT

## 2025-02-25 PROCEDURE — 3078F DIAST BP <80 MM HG: CPT

## 2025-02-25 PROCEDURE — 99214 OFFICE O/P EST MOD 30 MIN: CPT

## 2025-02-25 PROCEDURE — 1036F TOBACCO NON-USER: CPT

## 2025-02-25 PROCEDURE — G2211 COMPLEX E/M VISIT ADD ON: HCPCS

## 2025-02-25 RX ORDER — VARENICLINE TARTRATE 0.5 MG/1
TABLET, FILM COATED ORAL
Qty: 11 TABLET | Refills: 0 | Status: SHIPPED | OUTPATIENT
Start: 2025-02-25 | End: 2025-03-04

## 2025-02-25 RX ORDER — ALBUTEROL SULFATE 90 UG/1
2 INHALANT RESPIRATORY (INHALATION) EVERY 4 HOURS PRN
Qty: 8.5 G | Refills: 0 | Status: SHIPPED | OUTPATIENT
Start: 2025-02-25 | End: 2026-02-25

## 2025-02-25 RX ORDER — ALBUTEROL SULFATE 0.83 MG/ML
2.5 SOLUTION RESPIRATORY (INHALATION) 4 TIMES DAILY PRN
Qty: 3 ML | Refills: 1 | Status: SHIPPED | OUTPATIENT
Start: 2025-02-25 | End: 2026-02-25

## 2025-02-25 RX ORDER — NEBULIZER AND COMPRESSOR
EACH MISCELLANEOUS
Qty: 1 EACH | Refills: 0 | Status: SHIPPED | OUTPATIENT
Start: 2025-02-25

## 2025-02-25 RX ORDER — VARENICLINE TARTRATE 1 MG/1
1 TABLET, FILM COATED ORAL 2 TIMES DAILY
Qty: 60 TABLET | Refills: 5 | Status: SHIPPED | OUTPATIENT
Start: 2025-02-25

## 2025-02-25 ASSESSMENT — PATIENT HEALTH QUESTIONNAIRE - PHQ9
2. FEELING DOWN, DEPRESSED OR HOPELESS: NOT AT ALL
SUM OF ALL RESPONSES TO PHQ9 QUESTIONS 1 & 2: 0
1. LITTLE INTEREST OR PLEASURE IN DOING THINGS: NOT AT ALL

## 2025-02-25 NOTE — PROGRESS NOTES
Subjective   Silvio Noel is a 69 y.o. male who presents for Cough, Shortness of Breath, Nasal Congestion, and Loss of Consciousness.    PMH CAD, lung nodules, hypercalcemia, ICD s/p cardiac arrest, hypothyroidism, IDDM2, deviated septum, chronic sinusitis, depression, anxiety, chronic cough.  He has had chronic cough for years, but this episode he has had cough since December. Reports that in the past he has had cough syncope.  Says that about a week ago he had a coughing spell and at that time felt lightheaded and dizzy, but did not pass out.  Cough is nonproductive and no associated sick symptoms. Has tried otc medications with no resolution. He takes his wife's trelegy and albuterol, which he reports helps. Uses the albuterol about 5x weekly.     He did have PFTs done in 2024 due to concern that he may have COPD after years of smoking.  This was not diagnostic for COPD and showed normal pulmonary function.    Reports being diagnosed with asthma about 30 years ago. Says he used to take albuterol inhaler.     Still smokes and says he usually just quits cold turkey.  Longest he is refraining from smoking is about 6 months.  Tried nicotine patches years ago.  Has not tried any other cessation aids such as Wellbutrin or Chantix.       Visit Vitals  /70 (BP Location: Right arm, Patient Position: Sitting, BP Cuff Size: Large adult)   Pulse 68   Temp 36.3 °C (97.3 °F) (Temporal)   Resp 16      Objective   Physical Exam  Vitals reviewed.   Constitutional:       General: He is not in acute distress.     Appearance: Normal appearance. He is not toxic-appearing.   HENT:      Head: Normocephalic and atraumatic.      Nose: Nose normal.   Eyes:      Extraocular Movements: Extraocular movements intact.   Cardiovascular:      Rate and Rhythm: Normal rate and regular rhythm.   Pulmonary:      Effort: Pulmonary effort is normal. No respiratory distress.      Breath sounds: Rales present. No wheezing or rhonchi.   Skin:      General: Skin is warm and dry.   Neurological:      General: No focal deficit present.      Mental Status: He is alert.   Psychiatric:         Mood and Affect: Mood normal.         Behavior: Behavior normal.       Over the past 2 weeks, how often have you been bothered by any of the following problems?  Little interest or pleasure in doing things: Not at all  Feeling down, depressed, or hopeless: Not at all  Patient Health Questionnaire-2 Score: 0    Assessment/Plan      Assessment & Plan  Centrilobular emphysema (Multi)  Patient has had recent lung imaging that revealed centrilobular emphysematous changes.  She was seeing Dr. Rand pulmonology due to findings of lung nodules in the setting of hypercalcemia there was concern that he may have sarcoidosis.  Based on Dr. Tracy last notes he seems like he is recommending routine lung cancer screening to resume.  He has also had PFTs in the past that did not reveal any COPD despite his long history of smoking.  Discussed at length today that I believe smoking is the  of his cough and cessation is recommended for multiple reasons including treat his cough.  He is also extremely high risk from cardiac standpoint given his history of cardiac arrest and ICD, paroxysmal V. tach, SVT, hypertension, etc.  Discussed multiple pharmacologic options for bleeding and cessation.  Patient seemed open to either nicotine patches or Chantix, and we will try Chantix as he reports being diagnosed with asthma about 30 years ago sent in prescription for nebulized albuterol to see if this will also help with his cough.  He reports using albuterol inhaler having improvement with his symptoms.    Also strongly recommend that he immediately seek medical attention if he has any signs or symptoms of syncope given that he has significant cardiac history.   Orders:    nebulizer and compressor device; Use as directed    albuterol (ProAir HFA) 90 mcg/actuation inhaler; Inhale 2 puffs every 4  "hours if needed for wheezing or shortness of breath.    albuterol 2.5 mg /3 mL (0.083 %) nebulizer solution; Take 3 mL (2.5 mg) by nebulization 4 times a day as needed for wheezing or shortness of breath.    Follow Up In [facility] - PCP; Future    Tobacco use  Tobacco use disorder with current smoking.  This is a strong possibility of asthma.  We have been albuterol helpful with cough. We will start Chantix to help him with smoking permanently.  He has quit cold turkey for oriented x 6 months and 2 months, but has not been able to sustain this.  He will follow-up in 1 month.  Orders:    varenicline tartrate (Chantix) 0.5 mg tablet; Take 1 tablet (0.5 mg) by mouth once daily for 3 days, THEN 1 tablet (0.5 mg) 2 times a day for 4 days.    varenicline tartrate (Chantix) 1 mg tablet; Take 1 tablet (1 mg) by mouth 2 times a day.    Follow Up In [facility] - PCP; Future           This note was partially created using voice recognition software and is inherently subject to errors including those of syntax and \"sound-alike\" substitutions which may escape proofreading. In such instances, original meaning may be extrapolated by contextual derivation.      "

## 2025-03-03 ENCOUNTER — APPOINTMENT (OUTPATIENT)
Dept: CARDIOLOGY | Facility: CLINIC | Age: 70
End: 2025-03-03
Payer: MEDICARE

## 2025-03-03 VITALS
SYSTOLIC BLOOD PRESSURE: 130 MMHG | HEIGHT: 68 IN | WEIGHT: 264 LBS | DIASTOLIC BLOOD PRESSURE: 78 MMHG | BODY MASS INDEX: 40.01 KG/M2 | HEART RATE: 74 BPM

## 2025-03-03 DIAGNOSIS — Z87.891 FORMER SMOKER: ICD-10-CM

## 2025-03-03 DIAGNOSIS — I25.10 CORONARY ARTERY DISEASE INVOLVING NATIVE HEART, UNSPECIFIED VESSEL OR LESION TYPE, UNSPECIFIED WHETHER ANGINA PRESENT: ICD-10-CM

## 2025-03-03 DIAGNOSIS — I25.2 PAST MYOCARDIAL INFARCTION: ICD-10-CM

## 2025-03-03 DIAGNOSIS — I25.5 ISCHEMIC CARDIOMYOPATHY: ICD-10-CM

## 2025-03-03 DIAGNOSIS — E78.2 MIXED HYPERLIPIDEMIA: ICD-10-CM

## 2025-03-03 PROCEDURE — 3078F DIAST BP <80 MM HG: CPT | Performed by: INTERNAL MEDICINE

## 2025-03-03 PROCEDURE — 1159F MED LIST DOCD IN RCRD: CPT | Performed by: INTERNAL MEDICINE

## 2025-03-03 PROCEDURE — 3075F SYST BP GE 130 - 139MM HG: CPT | Performed by: INTERNAL MEDICINE

## 2025-03-03 PROCEDURE — 4010F ACE/ARB THERAPY RXD/TAKEN: CPT | Performed by: INTERNAL MEDICINE

## 2025-03-03 PROCEDURE — 3008F BODY MASS INDEX DOCD: CPT | Performed by: INTERNAL MEDICINE

## 2025-03-03 PROCEDURE — 99214 OFFICE O/P EST MOD 30 MIN: CPT | Performed by: INTERNAL MEDICINE

## 2025-03-03 ASSESSMENT — ENCOUNTER SYMPTOMS
RESPIRATORY NEGATIVE: 1
CONSTITUTIONAL NEGATIVE: 1
LIGHT-HEADEDNESS: 0
SHORTNESS OF BREATH: 0
NEUROLOGICAL NEGATIVE: 1
CARDIOVASCULAR NEGATIVE: 1

## 2025-03-03 NOTE — PROGRESS NOTES
Referred by Dr. Guzman ref. provider found provider found for   Chief Complaint   Patient presents with    Follow-up     6 month follow up on SVT        History of Present Illness  Silvio Noel is a 69 y.o. year old male patient is here for follow-up.  Blood pressure is adequately controlled denies any symptoms of chest pain palpitations syncope or presyncope.  Had ICD implantation in being checked periodically.  Denied any symptoms of heart failure.  Discussed with the patient we will continue medication will call for any problem and follow-up as scheduled    Past Medical History  Past Medical History:   Diagnosis Date    Atherosclerotic heart disease of native coronary artery without angina pectoris 2019    Coronary artery disease without angina pectoris, unspecified vessel or lesion type, unspecified whether native or transplanted heart    Diverticulitis, colon 10/31/2023    HLD (hyperlipidemia)     Ischemic cardiomyopathy     MI (myocardial infarction) (Multi)        Social History  Social History     Tobacco Use    Smoking status: Former     Current packs/day: 0.00     Types: Cigarettes     Quit date: 2023     Years since quittin.5    Smokeless tobacco: Never   Vaping Use    Vaping status: Never Used   Substance Use Topics    Alcohol use: Yes     Alcohol/week: 10.0 standard drinks of alcohol     Types: 3 Cans of beer, 2 Shots of liquor, 5 Standard drinks or equivalent per week    Drug use: Not Currently       Family History     Family History   Problem Relation Name Age of Onset    Other (malignant neoplasm of breast) Mother      Other (cardiac disorder) Father      Other (ischemic heart disease) Other         Review of Systems  As per HPI, all other systems reviewed and negative.    Allergies:  Allergies   Allergen Reactions    Codeine Unknown     Blood pressure goes down    Hydrocodone-Acetaminophen Unknown     Blood pressure goes up    Lisinopril Cough        Outpatient Medications:  Current  "Outpatient Medications   Medication Instructions    albuterol (ProAir HFA) 90 mcg/actuation inhaler 2 puffs, inhalation, Every 4 hours PRN    albuterol 2.5 mg, nebulization, 4 times daily PRN    aspirin 81 mg EC tablet 1 tablet, Daily    atenolol (TENORMIN) 25 mg, oral, 2 times daily    baclofen (LIORESAL) 10 mg, oral, 2 times daily    bexagliflozin (BRENZAVVY) 20 mg, oral, Daily    clopidogrel (PLAVIX) 75 mg, oral, Daily    cyanocobalamin, vitamin B-12, (VITAMIN B-12 ORAL) Take by mouth.    fenofibrate (TRICOR) 145 mg, oral, Daily, Take with food.    FLUoxetine (PROZAC) 20 mg, oral, Daily    fluticasone (Flonase) 50 mcg/actuation nasal spray 1 spray, Daily    FreeStyle Lite Strips strip USE TWICE DAILY    glipiZIDE (GLUCOTROL) 5 mg, oral, 2 times daily before meals, 1 week after stopping trulicity. Skip if not eating    insulin glargine (Basaglar KwikPen U-100 Insulin) 100 unit/mL (3 mL) pen Inject 25 units under the skin daily    lancets (Accu-Chek Softclix Lancets) misc Use to test blood sugar twice daily    levothyroxine (SYNTHROID, LEVOXYL) 175 mcg, oral, Daily    losartan (COZAAR) 50 mg, oral, 2 times daily    magnesium oxide (Mag-Ox) 400 mg (241.3 mg magnesium) tablet 1 tablet, Daily    MELOXICAM ORAL 15 mg    metFORMIN XR (GLUCOPHAGE-XR) 1,000 mg, oral, 2 times daily, With food    nebulizer and compressor device Use as directed    nitroglycerin (NITROSTAT) 0.4 mg, Every 5 min PRN    omeprazole (PRILOSEC) 40 mg, oral, Daily    pen needle, diabetic 32 gauge x 5/32\" needle 1 Needle, miscellaneous, Daily    pioglitazone (ACTOS) 45 mg, oral, Daily    rosuvastatin (CRESTOR) 40 mg, oral, Daily    tadalafil (CIALIS) 5 mg, oral, Daily    varenicline tartrate (Chantix) 0.5 mg tablet Take 1 tablet (0.5 mg) by mouth once daily for 3 days, THEN 1 tablet (0.5 mg) 2 times a day for 4 days.    varenicline tartrate (CHANTIX) 1 mg, oral, 2 times daily    ZINC ORAL 100 mg         Vitals:  Vitals:    03/03/25 1416   BP: 130/78 "   Pulse: 74       Physical Exam:  Physical Exam  Constitutional:       Comments: Morbid obesity   Neck:      Vascular: No carotid bruit.   Cardiovascular:      Rate and Rhythm: Normal rate and regular rhythm.      Pulses: Normal pulses.      Heart sounds: Normal heart sounds. No murmur heard.  Pulmonary:      Effort: Pulmonary effort is normal.      Breath sounds: Normal breath sounds.   Musculoskeletal:         General: Normal range of motion.      Cervical back: Normal range of motion.   Skin:     General: Skin is warm and dry.   Neurological:      General: No focal deficit present.      Mental Status: He is alert and oriented to person, place, and time.         Review of Systems   Constitutional: Negative.    Respiratory: Negative.  Negative for shortness of breath.    Cardiovascular: Negative.  Negative for chest pain.   Neurological: Negative.  Negative for light-headedness.         Assessment/Plan   Problem List Items Addressed This Visit             ICD-10-CM    CAD (coronary artery disease) I25.10    Ischemic cardiomyopathy I25.5    Mixed hyperlipidemia E78.2    Former smoker Z87.891    Past myocardial infarction I25.2    BMI 40.0-44.9, adult (Multi) Z68.41       Scribe Attestation  By signing my name below, Annelise MURPHY LPN  , Scribe   attest that this documentation has been prepared under the direction and in the presence of Savita Newman MD.      Savita Newman MD [facility]  Interventional Cardiology   of [facility]     Thank you for allowing me to participate in the care of this patient. Please do not hesitate to contact me with any further questions or concerns.

## 2025-03-06 DIAGNOSIS — J43.2 CENTRILOBULAR EMPHYSEMA (MULTI): ICD-10-CM

## 2025-03-06 RX ORDER — ALBUTEROL SULFATE 0.83 MG/ML
2.5 SOLUTION RESPIRATORY (INHALATION) 4 TIMES DAILY PRN
Qty: 3 ML | Refills: 1 | Status: SHIPPED | OUTPATIENT
Start: 2025-03-06 | End: 2026-03-06

## 2025-04-16 ENCOUNTER — APPOINTMENT (OUTPATIENT)
Dept: ENDOCRINOLOGY | Facility: CLINIC | Age: 70
End: 2025-04-16
Payer: MEDICARE

## 2025-04-16 VITALS
DIASTOLIC BLOOD PRESSURE: 64 MMHG | BODY MASS INDEX: 40.01 KG/M2 | HEIGHT: 68 IN | SYSTOLIC BLOOD PRESSURE: 112 MMHG | WEIGHT: 264 LBS

## 2025-04-16 DIAGNOSIS — E11.9 TYPE 2 DIABETES MELLITUS WITHOUT COMPLICATION, WITH LONG-TERM CURRENT USE OF INSULIN: ICD-10-CM

## 2025-04-16 DIAGNOSIS — J43.2 CENTRILOBULAR EMPHYSEMA (MULTI): ICD-10-CM

## 2025-04-16 DIAGNOSIS — E83.52 HYPERCALCEMIA: ICD-10-CM

## 2025-04-16 DIAGNOSIS — E03.9 HYPOTHYROIDISM, ADULT: ICD-10-CM

## 2025-04-16 DIAGNOSIS — E11.59 TYPE 2 DIABETES MELLITUS WITH OTHER CIRCULATORY COMPLICATION, WITHOUT LONG-TERM CURRENT USE OF INSULIN: Primary | ICD-10-CM

## 2025-04-16 DIAGNOSIS — E11.69 TYPE 2 DIABETES MELLITUS WITH OTHER SPECIFIED COMPLICATION, UNSPECIFIED WHETHER LONG TERM INSULIN USE (MULTI): ICD-10-CM

## 2025-04-16 DIAGNOSIS — I25.10 CORONARY ARTERY DISEASE INVOLVING NATIVE HEART, UNSPECIFIED VESSEL OR LESION TYPE, UNSPECIFIED WHETHER ANGINA PRESENT: ICD-10-CM

## 2025-04-16 DIAGNOSIS — Z79.4 TYPE 2 DIABETES MELLITUS WITHOUT COMPLICATION, WITH LONG-TERM CURRENT USE OF INSULIN: ICD-10-CM

## 2025-04-16 LAB
POC FINGERSTICK BLOOD GLUCOSE: 189 MG/DL (ref 70–100)
POC HEMOGLOBIN A1C: 8.2 % (ref 4.2–6.5)

## 2025-04-16 PROCEDURE — 3078F DIAST BP <80 MM HG: CPT | Performed by: HOSPITALIST

## 2025-04-16 PROCEDURE — 3008F BODY MASS INDEX DOCD: CPT | Performed by: HOSPITALIST

## 2025-04-16 PROCEDURE — 99214 OFFICE O/P EST MOD 30 MIN: CPT | Performed by: HOSPITALIST

## 2025-04-16 PROCEDURE — 83036 HEMOGLOBIN GLYCOSYLATED A1C: CPT | Performed by: HOSPITALIST

## 2025-04-16 PROCEDURE — 82962 GLUCOSE BLOOD TEST: CPT | Performed by: HOSPITALIST

## 2025-04-16 PROCEDURE — 3052F HG A1C>EQUAL 8.0%<EQUAL 9.0%: CPT | Performed by: HOSPITALIST

## 2025-04-16 PROCEDURE — 3074F SYST BP LT 130 MM HG: CPT | Performed by: HOSPITALIST

## 2025-04-16 PROCEDURE — 4010F ACE/ARB THERAPY RXD/TAKEN: CPT | Performed by: HOSPITALIST

## 2025-04-16 RX ORDER — LEVOTHYROXINE SODIUM 175 UG/1
175 TABLET ORAL DAILY
Qty: 90 TABLET | Refills: 2 | Status: SHIPPED | OUTPATIENT
Start: 2025-04-16

## 2025-04-16 RX ORDER — PIOGLITAZONE 45 MG/1
45 TABLET ORAL DAILY
Qty: 90 TABLET | Refills: 2 | Status: SHIPPED | OUTPATIENT
Start: 2025-04-16

## 2025-04-16 RX ORDER — METFORMIN HYDROCHLORIDE 500 MG/1
1000 TABLET, EXTENDED RELEASE ORAL 2 TIMES DAILY
Qty: 360 TABLET | Refills: 3 | Status: SHIPPED | OUTPATIENT
Start: 2025-04-16 | End: 2026-04-16

## 2025-04-16 RX ORDER — INSULIN GLARGINE-YFGN 100 [IU]/ML
28 INJECTION, SOLUTION SUBCUTANEOUS NIGHTLY
Qty: 24 ML | Refills: 2 | Status: SHIPPED | OUTPATIENT
Start: 2025-04-16 | End: 2026-01-20

## 2025-04-16 RX ORDER — SEMAGLUTIDE 1.34 MG/ML
INJECTION, SOLUTION SUBCUTANEOUS
Qty: 4 ML | Refills: 1 | Status: SHIPPED | OUTPATIENT
Start: 2025-04-16

## 2025-04-16 RX ORDER — GLIPIZIDE 5 MG/1
5 TABLET ORAL
Qty: 180 TABLET | Refills: 3 | Status: SHIPPED | OUTPATIENT
Start: 2025-04-16 | End: 2026-04-16

## 2025-04-16 ASSESSMENT — ENCOUNTER SYMPTOMS
CONSTIPATION: 0
SLEEP DISTURBANCE: 0
LIGHT-HEADEDNESS: 0
VOICE CHANGE: 0
DIARRHEA: 0
CHEST TIGHTNESS: 0
ABDOMINAL DISTENTION: 0
UNEXPECTED WEIGHT CHANGE: 1
ABDOMINAL PAIN: 0
EYE ITCHING: 0
TREMORS: 0
VOMITING: 0
FREQUENCY: 0
NAUSEA: 0
SORE THROAT: 0
TROUBLE SWALLOWING: 0
AGITATION: 0
ARTHRALGIAS: 1
PALPITATIONS: 0
DYSURIA: 0
SHORTNESS OF BREATH: 0
HEADACHES: 0
PHOTOPHOBIA: 0
NERVOUS/ANXIOUS: 0

## 2025-04-16 NOTE — PROGRESS NOTES
Subjective   Patient ID:     Patient ID(Dx DM: 2011/PCP: Delon/Podiatry: does not see one /Eye exam: yearly, /Patient testing glucose once daily./9/4/, Hypothyroidism (Current regimen: 175- 1 tab daily ), Vitamin D Deficiency (Not taking OTC vit D ), and Abnormal Calcium (no family history of hypercalcemia or hyperparathyroidism)  Lab Results   Component Value Date    HGBA1C 8.2 (A) 04/16/2025      Lab Results   Component Value Date    TSH 0.48 05/03/2024      Lab Results   Component Value Date    CALCIUM 11.1 (H) 10/02/2024    PHOS 3.1 01/30/2024      HPI  See assessment and plan  Review of Systems   Constitutional:  Positive for unexpected weight change.   HENT:  Negative for sore throat, trouble swallowing and voice change.    Eyes:  Negative for photophobia, itching and visual disturbance.   Respiratory:  Negative for chest tightness and shortness of breath.    Cardiovascular:  Negative for chest pain and palpitations.   Gastrointestinal:  Negative for abdominal distention, abdominal pain, constipation, diarrhea, nausea and vomiting.   Endocrine: Negative for cold intolerance, heat intolerance and polyuria.   Genitourinary:  Negative for dysuria and frequency.   Musculoskeletal:  Positive for arthralgias.   Skin:  Negative for pallor.   Allergic/Immunologic: Negative for environmental allergies.   Neurological:  Negative for tremors, light-headedness and headaches.   Psychiatric/Behavioral:  Negative for agitation and sleep disturbance. The patient is not nervous/anxious.        Objective   Physical Exam  Constitutional:       Appearance: Normal appearance.   HENT:      Head: Normocephalic.      Nose: Nose normal.      Mouth/Throat:      Mouth: Mucous membranes are moist.   Eyes:      Extraocular Movements: Extraocular movements intact.   Cardiovascular:      Rate and Rhythm: Normal rate.   Pulmonary:      Effort: Pulmonary effort is normal. No respiratory distress.   Abdominal:      General: There is no  "distension.   Musculoskeletal:         General: Normal range of motion.      Cervical back: Normal range of motion and neck supple.   Skin:     General: Skin is warm and dry.   Neurological:      Mental Status: He is alert and oriented to person, place, and time.   Psychiatric:         Mood and Affect: Mood normal.      Visit Vitals  /64   Ht 1.727 m (5' 8\")   Wt 120 kg (264 lb)   BMI 40.14 kg/m²   Smoking Status Former   BSA 2.4 m²        Assessment/Plan   Diagnoses and all orders for this visit:  Type 2 diabetes mellitus without complication, with long-term current use of insulin (Multi)  -     POCT glucose manually resulted  -     POCT glycosylated hemoglobin (Hb A1C) manually resulted  Hypothyroidism, adult  -     US thyroid; Future  Mixed hyperlipidemia  Hypercalcemia            # T 2DM above goal of <7%    Current regimen :   Metformin extended release 500 mg 2 tablet twice a day  Brenzavvy 20 mg daily (getting online cost plus drugs-$50 per month mentions okay to continue)  Glipizide 5 mg 1 tablet twice a day before food  pioglitazone 45mg daily,    Restarted taking basaglar 0-0-0-28( started 2023)     Past medication  Trulicity and Synjardy stopped as insurance is not covering 10/2024-switch to metformin Brenzavvy and glipizide instead  Checking BG once morning -120s ,   denies any hypoglycemia symptoms        Interval history: Gaining weight.  Gained almost 15 to 20 pounds after stopping Trulicity  .  Eating chips and milk   PLAN   Discussed Ozempic.   Discussed mechanism of action, side effects including GI side effects, pancreatitis, cholecystitis  Start 0.25 mg once weekly for 4 weeks if no side effects increase to 0.5 mg once weekly thereafter  Clinical pharmacy referral  If Ozempic not covered will increase the glipizide to 1 tablet before first meal of the day and 2 tablets before dinner.  No change in other regimen.  Increase oral hydration.   Follow kidney function with Dr. Delon abel " plan for GC injection advised to call me   -on ARB  -creatinine normal   on statin. LDL at goal, repeat  -eye exam-recent cataract  -negative microalbumin  -Pt instructed to call office with any hypoglycemia, hyperglycemia, or any other concerns.     Vitamin D deficiency:    vitamin D3 over-the-counter 4000 units daily    # PTH independent Hypercalcemia: Mild hypocalcemia which is not new recent blood work shows calcium 10.2  He denies history of kidney stones, no family history of hypercalcemia or hyperparathyroidism  No known osteoporosis or kidney disease  Did had CT lung with some nodules followed with primary care physician   pTH low , PTHrp normal ACE nml   SPEP UPEP negative.  He underwent PET scan 11/29/2024 which only showed an hypermetabolic thyroid nodule  Check ultrasound thyroid  Unclear cause of hypercalcemia at this time.  will check cortisol, DHEA-S      Hypothyroidism:TSH was 0. 48 in 5/ 2024   clinically euthyroid    175- 1 tab daily  Repeat TFT          RTC 6m      SH- works with [employer], works from home , has 3 kid and 3 step kids, 15 GKs ,  TWIN GK CAME IN  AUG 2023 .      HE retired August 2, 2024   He is into music

## 2025-04-17 ENCOUNTER — TELEPHONE (OUTPATIENT)
Dept: CARDIOLOGY | Facility: CLINIC | Age: 70
End: 2025-04-17
Payer: MEDICARE

## 2025-04-17 ENCOUNTER — PATIENT MESSAGE (OUTPATIENT)
Dept: PRIMARY CARE | Facility: CLINIC | Age: 70
End: 2025-04-17
Payer: MEDICARE

## 2025-04-17 DIAGNOSIS — I25.10 CORONARY ARTERY DISEASE INVOLVING NATIVE HEART, UNSPECIFIED VESSEL OR LESION TYPE, UNSPECIFIED WHETHER ANGINA PRESENT: ICD-10-CM

## 2025-04-17 DIAGNOSIS — E78.41 ELEVATED LIPOPROTEIN(A): ICD-10-CM

## 2025-04-17 DIAGNOSIS — F41.9 ANXIETY: ICD-10-CM

## 2025-04-17 DIAGNOSIS — I10 HTN (HYPERTENSION), BENIGN: ICD-10-CM

## 2025-04-17 DIAGNOSIS — E78.5 DYSLIPIDEMIA: ICD-10-CM

## 2025-04-17 DIAGNOSIS — M25.512 CHRONIC PAIN OF BOTH SHOULDERS: Primary | ICD-10-CM

## 2025-04-17 DIAGNOSIS — K21.9 GASTROESOPHAGEAL REFLUX DISEASE WITHOUT ESOPHAGITIS: ICD-10-CM

## 2025-04-17 DIAGNOSIS — T78.40XD ALLERGY, SUBSEQUENT ENCOUNTER: ICD-10-CM

## 2025-04-17 DIAGNOSIS — M25.511 CHRONIC PAIN OF BOTH SHOULDERS: Primary | ICD-10-CM

## 2025-04-17 DIAGNOSIS — Z95.810 ICD (IMPLANTABLE CARDIOVERTER-DEFIBRILLATOR) IN PLACE: Primary | ICD-10-CM

## 2025-04-17 DIAGNOSIS — G89.29 CHRONIC PAIN OF BOTH SHOULDERS: Primary | ICD-10-CM

## 2025-04-17 LAB
T4 FREE SERPL-MCNC: 1.3 NG/DL (ref 0.8–1.8)
TSH SERPL-ACNC: 11.87 MIU/L (ref 0.4–4.5)

## 2025-04-17 RX ORDER — MELOXICAM 15 MG/1
15 TABLET ORAL DAILY
Qty: 90 TABLET | Refills: 1 | Status: SHIPPED | OUTPATIENT
Start: 2025-04-17 | End: 2026-04-17

## 2025-04-17 RX ORDER — LOSARTAN POTASSIUM 50 MG/1
50 TABLET ORAL 2 TIMES DAILY
Qty: 180 TABLET | Refills: 3 | Status: SHIPPED | OUTPATIENT
Start: 2025-04-17 | End: 2025-04-17 | Stop reason: SDUPTHER

## 2025-04-17 RX ORDER — OMEPRAZOLE 40 MG/1
40 CAPSULE, DELAYED RELEASE ORAL DAILY
Qty: 90 CAPSULE | Refills: 2 | Status: SHIPPED | OUTPATIENT
Start: 2025-04-17

## 2025-04-17 RX ORDER — LOSARTAN POTASSIUM 50 MG/1
50 TABLET ORAL 2 TIMES DAILY
Qty: 180 TABLET | Refills: 3 | Status: SHIPPED | OUTPATIENT
Start: 2025-04-17 | End: 2026-04-17

## 2025-04-17 RX ORDER — NITROGLYCERIN 0.4 MG/1
0.4 TABLET SUBLINGUAL EVERY 5 MIN PRN
Qty: 90 TABLET | Refills: 1 | Status: SHIPPED | OUTPATIENT
Start: 2025-04-17

## 2025-04-17 RX ORDER — FLUOXETINE 20 MG/1
20 TABLET ORAL DAILY
Qty: 90 TABLET | Refills: 3 | Status: SHIPPED | OUTPATIENT
Start: 2025-04-17

## 2025-04-17 RX ORDER — ATENOLOL 50 MG/1
25 TABLET ORAL 2 TIMES DAILY
Qty: 90 TABLET | Refills: 1 | Status: SHIPPED | OUTPATIENT
Start: 2025-04-17

## 2025-04-17 RX ORDER — FENOFIBRATE 145 MG/1
145 TABLET, FILM COATED ORAL DAILY
Qty: 90 TABLET | Refills: 3 | Status: SHIPPED | OUTPATIENT
Start: 2025-04-17

## 2025-04-17 RX ORDER — ATENOLOL 50 MG/1
25 TABLET ORAL 2 TIMES DAILY
Qty: 90 TABLET | Refills: 1 | Status: SHIPPED | OUTPATIENT
Start: 2025-04-17 | End: 2025-04-17 | Stop reason: SDUPTHER

## 2025-04-17 RX ORDER — ROSUVASTATIN CALCIUM 40 MG/1
40 TABLET, COATED ORAL DAILY
Qty: 90 TABLET | Refills: 3 | Status: SHIPPED | OUTPATIENT
Start: 2025-04-17

## 2025-04-17 RX ORDER — FLUTICASONE PROPIONATE 50 MCG
1 SPRAY, SUSPENSION (ML) NASAL DAILY
Qty: 16 G | Refills: 3 | Status: SHIPPED | OUTPATIENT
Start: 2025-04-17

## 2025-04-17 RX ORDER — ALBUTEROL SULFATE 90 UG/1
4 INHALANT RESPIRATORY (INHALATION) EVERY 4 HOURS PRN
Qty: 18 G | Refills: 3 | Status: SHIPPED | OUTPATIENT
Start: 2025-04-17

## 2025-04-17 RX ORDER — CLOPIDOGREL BISULFATE 75 MG/1
75 TABLET ORAL DAILY
Qty: 90 TABLET | Refills: 3 | Status: SHIPPED | OUTPATIENT
Start: 2025-04-17

## 2025-04-17 NOTE — TELEPHONE ENCOUNTER
Pharmacist called and has questions for you about possible drug interactions with this patient's medications. Please call them back at [phone] Gavin or any available pharmacist.

## 2025-04-18 RX ORDER — LOSARTAN POTASSIUM 50 MG/1
50 TABLET ORAL 2 TIMES DAILY
Qty: 180 TABLET | Refills: 3 | Status: SHIPPED | OUTPATIENT
Start: 2025-04-18

## 2025-04-20 LAB
ACTH PLAS-MCNC: 27 PG/ML (ref 6–50)
ALBUMIN SERPL-MCNC: 4.5 G/DL (ref 3.6–5.1)
ALP SERPL-CCNC: 43 U/L (ref 35–144)
ALT SERPL-CCNC: 24 U/L (ref 9–46)
ANION GAP SERPL CALCULATED.4IONS-SCNC: 13 MMOL/L (CALC) (ref 7–17)
AST SERPL-CCNC: 34 U/L (ref 10–35)
BILIRUB SERPL-MCNC: 0.4 MG/DL (ref 0.2–1.2)
BUN SERPL-MCNC: 21 MG/DL (ref 7–25)
CA-I SERPL-MCNC: 5.8 MG/DL (ref 4.7–5.5)
CALCIUM SERPL-MCNC: 10.6 MG/DL (ref 8.6–10.3)
CHLORIDE SERPL-SCNC: 100 MMOL/L (ref 98–110)
CHOLEST SERPL-MCNC: 134 MG/DL
CHOLEST/HDLC SERPL: 3.8 (CALC)
CO2 SERPL-SCNC: 21 MMOL/L (ref 20–32)
CORTIS SERPL-MCNC: 12.5 MCG/DL
CREAT SERPL-MCNC: 1.52 MG/DL (ref 0.7–1.35)
DHEA-S SERPL-MCNC: 47 MCG/DL (ref 20–217)
EGFRCR SERPLBLD CKD-EPI 2021: 50 ML/MIN/1.73M2
GLUCOSE SERPL-MCNC: 261 MG/DL (ref 65–99)
HDLC SERPL-MCNC: 35 MG/DL
LDLC SERPL CALC-MCNC: 64 MG/DL (CALC)
NONHDLC SERPL-MCNC: 99 MG/DL (CALC)
POTASSIUM SERPL-SCNC: 4.8 MMOL/L (ref 3.5–5.3)
PROT SERPL-MCNC: 7.1 G/DL (ref 6.1–8.1)
PTH-INTACT SERPL-MCNC: 27 PG/ML (ref 16–77)
SODIUM SERPL-SCNC: 134 MMOL/L (ref 135–146)
TRIGL SERPL-MCNC: 333 MG/DL

## 2025-04-21 ENCOUNTER — PATIENT MESSAGE (OUTPATIENT)
Dept: CARDIOLOGY | Facility: CLINIC | Age: 70
End: 2025-04-21
Payer: MEDICARE

## 2025-04-21 DIAGNOSIS — Z95.810 ICD (IMPLANTABLE CARDIOVERTER-DEFIBRILLATOR) IN PLACE: ICD-10-CM

## 2025-04-21 DIAGNOSIS — M25.512 CHRONIC PAIN OF BOTH SHOULDERS: ICD-10-CM

## 2025-04-21 DIAGNOSIS — M25.511 CHRONIC PAIN OF BOTH SHOULDERS: ICD-10-CM

## 2025-04-21 DIAGNOSIS — F41.9 ANXIETY: ICD-10-CM

## 2025-04-21 DIAGNOSIS — T78.40XD ALLERGY, SUBSEQUENT ENCOUNTER: ICD-10-CM

## 2025-04-21 DIAGNOSIS — G89.29 CHRONIC PAIN OF BOTH SHOULDERS: ICD-10-CM

## 2025-04-21 RX ORDER — FLUTICASONE PROPIONATE 50 MCG
1 SPRAY, SUSPENSION (ML) NASAL DAILY
Qty: 16 G | Refills: 3 | Status: SHIPPED | OUTPATIENT
Start: 2025-04-21 | End: 2025-04-21

## 2025-04-21 RX ORDER — FLUOXETINE 20 MG/1
20 TABLET ORAL DAILY
Qty: 90 TABLET | Refills: 3 | Status: SHIPPED | OUTPATIENT
Start: 2025-04-21 | End: 2025-04-22 | Stop reason: SDUPTHER

## 2025-04-21 RX ORDER — FLUOXETINE 20 MG/1
20 TABLET ORAL DAILY
Qty: 90 TABLET | Refills: 3 | Status: SHIPPED | OUTPATIENT
Start: 2025-04-21 | End: 2025-04-21

## 2025-04-21 RX ORDER — MELOXICAM 15 MG/1
15 TABLET ORAL DAILY
Qty: 90 TABLET | Refills: 1 | Status: SHIPPED | OUTPATIENT
Start: 2025-04-21 | End: 2025-04-21

## 2025-04-21 RX ORDER — FLUTICASONE PROPIONATE 50 MCG
1 SPRAY, SUSPENSION (ML) NASAL DAILY
Qty: 16 G | Refills: 3 | Status: SHIPPED | OUTPATIENT
Start: 2025-04-21

## 2025-04-21 RX ORDER — MELOXICAM 15 MG/1
15 TABLET ORAL DAILY
Qty: 90 TABLET | Refills: 1 | Status: SHIPPED | OUTPATIENT
Start: 2025-04-21 | End: 2026-04-21

## 2025-04-22 ENCOUNTER — TELEPHONE (OUTPATIENT)
Dept: PRIMARY CARE | Facility: CLINIC | Age: 70
End: 2025-04-22
Payer: MEDICARE

## 2025-04-22 ENCOUNTER — PATIENT MESSAGE (OUTPATIENT)
Dept: PRIMARY CARE | Facility: CLINIC | Age: 70
End: 2025-04-22
Payer: MEDICARE

## 2025-04-22 DIAGNOSIS — F41.9 ANXIETY: ICD-10-CM

## 2025-04-22 RX ORDER — FLUOXETINE 20 MG/1
20 TABLET ORAL DAILY
Qty: 90 TABLET | Refills: 3 | Status: SHIPPED | OUTPATIENT
Start: 2025-04-22

## 2025-04-22 NOTE — TELEPHONE ENCOUNTER
Pt's pharmacy called to request to change the Fluoxetine from tablet to capsule so that it is covered by insurance. Also was it okay for the Pt to take the medication with the Plavix? Per you medication is okay.

## 2025-04-23 ENCOUNTER — HOSPITAL ENCOUNTER (OUTPATIENT)
Dept: CARDIOLOGY | Facility: HOSPITAL | Age: 70
Discharge: HOME | End: 2025-04-23
Payer: MEDICARE

## 2025-04-23 DIAGNOSIS — Z95.810 PRESENCE OF AUTOMATIC CARDIOVERTER/DEFIBRILLATOR (AICD): ICD-10-CM

## 2025-04-23 DIAGNOSIS — I47.20 PAROXYSMAL VENTRICULAR TACHYCARDIA: ICD-10-CM

## 2025-04-23 PROCEDURE — 93296 REM INTERROG EVL PM/IDS: CPT

## 2025-04-23 RX ORDER — NITROGLYCERIN 0.4 MG/1
0.4 TABLET SUBLINGUAL EVERY 5 MIN PRN
Qty: 25 TABLET | Refills: 5 | Status: SHIPPED | OUTPATIENT
Start: 2025-04-23 | End: 2026-04-23

## 2025-04-24 ENCOUNTER — APPOINTMENT (OUTPATIENT)
Dept: PRIMARY CARE | Facility: CLINIC | Age: 70
End: 2025-04-24
Payer: MEDICARE

## 2025-04-24 VITALS
SYSTOLIC BLOOD PRESSURE: 126 MMHG | DIASTOLIC BLOOD PRESSURE: 84 MMHG | OXYGEN SATURATION: 98 % | RESPIRATION RATE: 16 BRPM | HEART RATE: 68 BPM | BODY MASS INDEX: 40.5 KG/M2 | TEMPERATURE: 97.5 F | WEIGHT: 266.38 LBS

## 2025-04-24 DIAGNOSIS — I47.29 OTHER VENTRICULAR TACHYCARDIA: ICD-10-CM

## 2025-04-24 DIAGNOSIS — J43.2 CENTRILOBULAR EMPHYSEMA (MULTI): ICD-10-CM

## 2025-04-24 DIAGNOSIS — Z87.891 FORMER TOBACCO USE: ICD-10-CM

## 2025-04-24 DIAGNOSIS — I10 HYPERTENSION, UNSPECIFIED TYPE: ICD-10-CM

## 2025-04-24 DIAGNOSIS — I42.8 OTHER CARDIOMYOPATHY: ICD-10-CM

## 2025-04-24 DIAGNOSIS — N18.30 STAGE 3 CHRONIC KIDNEY DISEASE, UNSPECIFIED WHETHER STAGE 3A OR 3B CKD (MULTI): Primary | ICD-10-CM

## 2025-04-24 DIAGNOSIS — I47.20 VENTRICULAR TACHYCARDIA (PAROXYSMAL): ICD-10-CM

## 2025-04-24 DIAGNOSIS — E11.59 TYPE 2 DIABETES MELLITUS WITH OTHER CIRCULATORY COMPLICATION, WITHOUT LONG-TERM CURRENT USE OF INSULIN: ICD-10-CM

## 2025-04-24 PROCEDURE — 3079F DIAST BP 80-89 MM HG: CPT

## 2025-04-24 PROCEDURE — 3074F SYST BP LT 130 MM HG: CPT

## 2025-04-24 PROCEDURE — 1159F MED LIST DOCD IN RCRD: CPT

## 2025-04-24 PROCEDURE — G2211 COMPLEX E/M VISIT ADD ON: HCPCS

## 2025-04-24 PROCEDURE — 3052F HG A1C>EQUAL 8.0%<EQUAL 9.0%: CPT

## 2025-04-24 PROCEDURE — 99214 OFFICE O/P EST MOD 30 MIN: CPT

## 2025-04-24 PROCEDURE — 4010F ACE/ARB THERAPY RXD/TAKEN: CPT

## 2025-04-24 NOTE — PROGRESS NOTES
Subjective   Silvio Noel is a 69 y.o. male who presents for Follow-up (Pt here for follow up on kidney testing and possible lab work).    Patient here to discuss recent lab work that he had done with his endocrinologist Dr. Betancur.  He noted that his kidney function had declined and wanted to discuss these results further with PCP Dr. Jernigan.    He is being managed by Dr. Hernandez for his diabetes and currently on Brenzavvy, glipizide, insulin, metformin, pioglitazone, ozempic started last week.     He also sees cardiology Dr. Newman who manages blood pressure and has him on atenolol and losartan.    Has not smoked since February.  He quit cold turkey.    Visit Vitals  /84 (BP Location: Right arm, Patient Position: Sitting)   Pulse 68   Temp 36.4 °C (97.5 °F) (Temporal)   Resp 16      Objective   Physical Exam  Vitals reviewed.   Constitutional:       General: He is not in acute distress.     Appearance: Normal appearance. He is not toxic-appearing.   HENT:      Head: Normocephalic and atraumatic.      Nose: Nose normal.   Eyes:      Extraocular Movements: Extraocular movements intact.   Cardiovascular:      Rate and Rhythm: Normal rate and regular rhythm.      Heart sounds: No murmur heard.     No friction rub. No gallop.   Pulmonary:      Effort: Pulmonary effort is normal. No respiratory distress.      Breath sounds: Normal breath sounds. No wheezing, rhonchi or rales.   Skin:     General: Skin is warm and dry.   Neurological:      General: No focal deficit present.      Mental Status: He is alert.   Psychiatric:         Mood and Affect: Mood normal.         Behavior: Behavior normal.            Assessment/Plan      Assessment & Plan  Stage 3 chronic kidney disease, unspecified whether stage 3a or 3b CKD (Multi)  Patient here to review recent labwork done by endocrinologist Dr. Hernandez. EGFR 50 with Bun Cr of 21/1.52. last kidney function from August of 2024 with similar findings. Likely secondary to age  "related changes as well as chronic conditions diabetes and HTN. He sees specialists who manage both these conditions. BP at goal in office today. Already on SGLT2.  Will continue to monitor for any changes. Redo   Orders:    Comprehensive metabolic panel; Future    Hypertension, unspecified type  Given that he was the G patient has well-managed hypertension by cardiologist Dr. Newman.  Discussed with him that SBP goal manage in the blood pressure as well as abnormalities with subsequent readings are responding for helping to slow progression of chronic kidney disease.  Will recheck CMP in about a month.  Orders:    Comprehensive metabolic panel; Future    Type 2 diabetes mellitus with other circulatory complication, without long-term current use of insulin  Diabetes is managed by endocrinology.  He is already on SGLT2 as well as other medications for diabetes he will follow-up with endocrinologist.  Orders:    Comprehensive metabolic panel; Future    Former tobacco use  Quit cold turkey in February   Orders:    Follow Up In [facility] - PCP           This note was partially created using voice recognition software and is inherently subject to errors including those of syntax and \"sound-alike\" substitutions which may escape proofreading. In such instances, original meaning may be extrapolated by contextual derivation.      "

## 2025-04-24 NOTE — ASSESSMENT & PLAN NOTE
Diabetes is managed by endocrinology.  He is already on SGLT2 as well as other medications for diabetes he will follow-up with endocrinologist.  Orders:    Comprehensive metabolic panel; Future

## 2025-04-24 NOTE — ASSESSMENT & PLAN NOTE
Patient here to review recent labwork done by endocrinologist Dr. Hernandez. EGFR 50 with Bun Cr of 21/1.52. last kidney function from August of 2024 with similar findings. Likely secondary to age related changes as well as chronic conditions diabetes and HTN. He sees specialists who manage both these conditions. BP at goal in office today. Already on SGLT2.  Will continue to monitor for any changes. Redo   Orders:    Comprehensive metabolic panel; Future

## 2025-04-24 NOTE — ASSESSMENT & PLAN NOTE
Given that he was the G patient has well-managed hypertension by cardiologist Dr. Newman.  Discussed with him that SBP goal manage in the blood pressure as well as abnormalities with subsequent readings are responding for helping to slow progression of chronic kidney disease.  Will recheck CMP in about a month.  Orders:    Comprehensive metabolic panel; Future

## 2025-05-01 ENCOUNTER — APPOINTMENT (OUTPATIENT)
Dept: PHARMACY | Facility: HOSPITAL | Age: 70
End: 2025-05-01
Payer: MEDICARE

## 2025-05-01 DIAGNOSIS — E11.59 TYPE 2 DIABETES MELLITUS WITH OTHER CIRCULATORY COMPLICATION, WITHOUT LONG-TERM CURRENT USE OF INSULIN: ICD-10-CM

## 2025-05-16 NOTE — PROGRESS NOTES
Clinical Pharmacy Team contacted Silvio Noel regarding a consultation for diabetes management thanks to a referral from Dr. Luli Dickens MD. Below is a summary of our conversation and recommendations:    ______________________________________________      Allergies[1]    Objective     There were no vitals taken for this visit.    Medical History[2]    Medications Ordered Prior to Encounter[3]      Lab Review  Lab Results   Component Value Date    BILITOT 0.4 2025    CALCIUM 10.6 (H) 2025    CO2 21 2025     2025    CREATININE 1.52 (H) 2025    GLUCOSE 261 (H) 2025    ALKPHOS 43 2025    K 4.8 2025    PROT 7.1 2025     (L) 2025    AST 34 2025    ALT 24 2025    BUN 21 2025    ANIONGAP 13 2025    PHOS 3.1 2024    ALBUMIN 4.5 2025    GFRMALE 70 03/10/2023     Lab Results   Component Value Date    TRIG 333 (H) 2025    CHOL 134 2025    LDLCALC 64 2025    HDL 35 (L) 2025     Lab Results   Component Value Date    HGBA1C 8.2 (A) 2025    HGBA1C 7.1 (A) 2024    HGBA1C 7.2 (A) 2024     The ASCVD Risk score (Tarun OROURKE, et al., 2019) failed to calculate for the following reasons:    Risk score cannot be calculated because patient has a medical history suggesting prior/existing ASCVD          PATIENT EDUCATION/GOALS    Goals  Fasting B - 130 mg/dL  Postprandial BG: less than 180 mg/dL  A1c: less than 7%    Type of encounter: [ in person/virtual ]    Provided counseling on lifestyle modifications, medications, and self-monitoring. Patient has no additional questions at this time.  Please reach out with any questions. Thank you.       Harrison Shannon, PharmD    Continue all meds under the continuation of care with the referring provider and clinical pharmacy team.         [1]   Allergies  Allergen Reactions    Codeine Unknown     Blood pressure goes down    Hydrocodone-Acetaminophen  Unknown     Blood pressure goes up    Lisinopril Cough   [2]   Past Medical History:  Diagnosis Date    Atherosclerotic heart disease of native coronary artery without angina pectoris 12/02/2019    Coronary artery disease without angina pectoris, unspecified vessel or lesion type, unspecified whether native or transplanted heart    Diverticulitis, colon 10/31/2023    HLD (hyperlipidemia)     Ischemic cardiomyopathy     MI (myocardial infarction) (Multi)    [3]   Current Outpatient Medications on File Prior to Visit   Medication Sig Dispense Refill    albuterol 2.5 mg /3 mL (0.083 %) nebulizer solution Take 3 mL (2.5 mg) by nebulization 4 times a day as needed for wheezing or shortness of breath. 3 mL 1    albuterol 90 mcg/actuation inhaler Inhale 4 puffs every 4 hours if needed for wheezing. 18 g 3    aspirin 81 mg EC tablet Take 1 tablet (81 mg) by mouth once daily.      atenolol (Tenormin) 50 mg tablet Take 0.5 tablets (25 mg) by mouth 2 times a day. 90 tablet 1    baclofen (Lioresal) 10 mg tablet Take 1 tablet (10 mg) by mouth 2 times a day. 60 tablet 5    bexagliflozin (Brenzavvy) 20 mg tablet Take 20 mg by mouth once daily. 90 tablet 3    clopidogrel (Plavix) 75 mg tablet Take 1 tablet (75 mg) by mouth once daily. 90 tablet 3    cyanocobalamin, vitamin B-12, (VITAMIN B-12 ORAL) Take by mouth.      fenofibrate (Tricor) 145 mg tablet Take 1 tablet (145 mg) by mouth once daily. Take with food. 90 tablet 3    FLUoxetine (PROzac) 20 mg tablet Take 1 tablet (20 mg) by mouth once daily. 90 tablet 3    fluticasone (Flonase) 50 mcg/actuation nasal spray Administer 1 spray into each nostril once daily. 16 g 3    FreeStyle Lite Strips strip USE TWICE DAILY      glipiZIDE (Glucotrol) 5 mg tablet Take 1 tablet (5 mg) by mouth 2 times a day before meals. 1 week after stopping trulicity. Skip if not eating 180 tablet 3    insulin glargine-yfgn 100 unit/mL (3 mL) pen Inject 28 Units under the skin once daily at bedtime. 24 mL  "2    lancets (Accu-Chek Softclix Lancets) misc Use to test blood sugar twice daily 200 each 1    levothyroxine (Synthroid, Levoxyl) 175 mcg tablet Take 1 tablet (175 mcg) by mouth once daily. 90 tablet 2    losartan (Cozaar) 50 mg tablet Take 1 tablet (50 mg) by mouth 2 times a day. 180 tablet 3    losartan (Cozaar) 50 mg tablet Take 1 tablet (50 mg) by mouth 2 times a day. 180 tablet 3    magnesium oxide (Mag-Ox) 400 mg (241.3 mg magnesium) tablet Take 1 tablet (400 mg) by mouth once daily.      meloxicam (Mobic) 15 mg tablet Take 1 tablet (15 mg) by mouth once daily. (Meloxicam 15 MG Oral Tablet);  TAKE 1 TABLET DAILY as NEEDED 90 tablet 1    metFORMIN XR (Glucophage-XR) 500 mg 24 hr tablet Take 2 tablets (1,000 mg) by mouth 2 times a day. With food 360 tablet 3    nebulizer and compressor device Use as directed 1 each 0    nitroglycerin (Nitrostat) 0.4 mg SL tablet Place 1 tablet (0.4 mg) under the tongue every 5 minutes if needed for chest pain. (Patient not taking: Reported on 4/24/2025) 25 tablet 5    omeprazole (PriLOSEC) 40 mg DR capsule Take 1 capsule (40 mg) by mouth once daily. 90 capsule 2    pen needle, diabetic 32 gauge x 5/32\" needle 1 Needle once daily. 100 each 1    pioglitazone (Actos) 45 mg tablet Take 1 tablet (45 mg) by mouth once daily. 90 tablet 2    rosuvastatin (Crestor) 40 mg tablet Take 1 tablet (40 mg) by mouth once daily. 90 tablet 3    semaglutide (Ozempic) 0.25 mg or 0.5 mg(2 mg/1.5 mL) pen injector 0.25 mg once weekly for 4 weeks if no side effects then increase 0.5 mg once weekly 4 mL 1    tadalafil (Cialis) 5 mg tablet TAKE 1 TABLET BY MOUTH EVERY DAY 90 tablet 0    varenicline tartrate (Chantix) 0.5 mg tablet Take 1 tablet (0.5 mg) by mouth once daily for 3 days, THEN 1 tablet (0.5 mg) 2 times a day for 4 days. 11 tablet 0    varenicline tartrate (Chantix) 1 mg tablet Take 1 tablet (1 mg) by mouth 2 times a day. (Patient not taking: Reported on 4/24/2025) 60 tablet 5    ZINC ORAL " Take 100 mg by mouth. (Zinc 100 MG Oral Tablet)       No current facility-administered medications on file prior to visit.

## 2025-05-24 DIAGNOSIS — I10 HYPERTENSION, UNSPECIFIED TYPE: ICD-10-CM

## 2025-05-24 DIAGNOSIS — N18.30 STAGE 3 CHRONIC KIDNEY DISEASE, UNSPECIFIED WHETHER STAGE 3A OR 3B CKD (MULTI): ICD-10-CM

## 2025-05-24 DIAGNOSIS — E11.59 TYPE 2 DIABETES MELLITUS WITH OTHER CIRCULATORY COMPLICATION, WITHOUT LONG-TERM CURRENT USE OF INSULIN: ICD-10-CM

## 2025-05-28 ENCOUNTER — APPOINTMENT (OUTPATIENT)
Dept: UROLOGY | Facility: CLINIC | Age: 70
End: 2025-05-28
Payer: MEDICARE

## 2025-05-28 VITALS — DIASTOLIC BLOOD PRESSURE: 84 MMHG | SYSTOLIC BLOOD PRESSURE: 135 MMHG | HEART RATE: 73 BPM

## 2025-05-28 DIAGNOSIS — N40.1 BENIGN PROSTATIC HYPERPLASIA WITH LOWER URINARY TRACT SYMPTOMS, SYMPTOM DETAILS UNSPECIFIED: ICD-10-CM

## 2025-05-28 DIAGNOSIS — N52.9 ERECTILE DYSFUNCTION, UNSPECIFIED ERECTILE DYSFUNCTION TYPE: ICD-10-CM

## 2025-05-28 DIAGNOSIS — J43.2 CENTRILOBULAR EMPHYSEMA (MULTI): ICD-10-CM

## 2025-05-28 DIAGNOSIS — N18.30 STAGE 3 CHRONIC KIDNEY DISEASE, UNSPECIFIED WHETHER STAGE 3A OR 3B CKD (MULTI): ICD-10-CM

## 2025-05-28 DIAGNOSIS — I47.20 VENTRICULAR TACHYCARDIA (PAROXYSMAL): ICD-10-CM

## 2025-05-28 DIAGNOSIS — I42.8 OTHER CARDIOMYOPATHIES: ICD-10-CM

## 2025-05-28 LAB
POC APPEARANCE, URINE: CLEAR
POC BILIRUBIN, URINE: NEGATIVE
POC BLOOD, URINE: NEGATIVE
POC COLOR, URINE: YELLOW
POC GLUCOSE, URINE: ABNORMAL MG/DL
POC KETONES, URINE: NEGATIVE MG/DL
POC LEUKOCYTES, URINE: NEGATIVE
POC NITRITE,URINE: NEGATIVE
POC PH, URINE: 6 PH
POC PROTEIN, URINE: NEGATIVE MG/DL
POC SPECIFIC GRAVITY, URINE: 1.01
POC UROBILINOGEN, URINE: 1 EU/DL

## 2025-05-28 PROCEDURE — 81003 URINALYSIS AUTO W/O SCOPE: CPT | Performed by: STUDENT IN AN ORGANIZED HEALTH CARE EDUCATION/TRAINING PROGRAM

## 2025-05-28 PROCEDURE — 3075F SYST BP GE 130 - 139MM HG: CPT | Performed by: STUDENT IN AN ORGANIZED HEALTH CARE EDUCATION/TRAINING PROGRAM

## 2025-05-28 PROCEDURE — 4010F ACE/ARB THERAPY RXD/TAKEN: CPT | Performed by: STUDENT IN AN ORGANIZED HEALTH CARE EDUCATION/TRAINING PROGRAM

## 2025-05-28 PROCEDURE — 3052F HG A1C>EQUAL 8.0%<EQUAL 9.0%: CPT | Performed by: STUDENT IN AN ORGANIZED HEALTH CARE EDUCATION/TRAINING PROGRAM

## 2025-05-28 PROCEDURE — 99214 OFFICE O/P EST MOD 30 MIN: CPT | Performed by: STUDENT IN AN ORGANIZED HEALTH CARE EDUCATION/TRAINING PROGRAM

## 2025-05-28 PROCEDURE — 3079F DIAST BP 80-89 MM HG: CPT | Performed by: STUDENT IN AN ORGANIZED HEALTH CARE EDUCATION/TRAINING PROGRAM

## 2025-05-28 PROCEDURE — G2211 COMPLEX E/M VISIT ADD ON: HCPCS | Performed by: STUDENT IN AN ORGANIZED HEALTH CARE EDUCATION/TRAINING PROGRAM

## 2025-05-28 PROCEDURE — 1160F RVW MEDS BY RX/DR IN RCRD: CPT | Performed by: STUDENT IN AN ORGANIZED HEALTH CARE EDUCATION/TRAINING PROGRAM

## 2025-05-28 PROCEDURE — 1159F MED LIST DOCD IN RCRD: CPT | Performed by: STUDENT IN AN ORGANIZED HEALTH CARE EDUCATION/TRAINING PROGRAM

## 2025-05-28 RX ORDER — SILDENAFIL 50 MG/1
50 TABLET, FILM COATED ORAL DAILY PRN
Qty: 10 TABLET | Refills: 11 | Status: SHIPPED | OUTPATIENT
Start: 2025-05-28 | End: 2025-09-25

## 2025-05-28 RX ORDER — TADALAFIL 5 MG/1
5 TABLET ORAL DAILY
Qty: 90 TABLET | Refills: 0 | Status: SHIPPED | OUTPATIENT
Start: 2025-05-28

## 2025-05-28 NOTE — PROGRESS NOTES
Scribed for Dr. Conor Yin by Vikas Talbert. I, Dr. Conor Yin have personally reviewed and agreed with the information entered by the Virtual Scribe. 05/28/25.    ASSESSMENT:  Problem List Items Addressed This Visit       BPH (benign prostatic hyperplasia)    Relevant Medications    tadalafil (Cialis) 5 mg tablet    Other Relevant Orders    POCT UA Automated manually resulted (Completed)    Post-Void Residual (Completed)     Other Visit Diagnoses         Erectile dysfunction, unspecified erectile dysfunction type        Relevant Medications    tadalafil (Cialis) 5 mg tablet    sildenafil (Viagra) 50 mg tablet            BPH with weak urinary stream - stream has improved on tadalafil.  Urinary frequency and urgency  Erectile dysfunction  Nocturia    PLAN:  #BPH  #ED  Currently not at treatment goal.   Denotes benefit with continuing daily Cialis for his BPH/ED.   Tolerating the medication without side-effects.   Discussed risks of continued use and alternative treatment options.   Elects to add Viagra 50 mg PRN for his ED to improve benefit.    Not interested in alternative options like ICI versus IPP.   Will reevaluate plan on an annual basis.  Rx's sent to pharmacy. Advised he cannot take NG while on this treatment plan and should discuss this with his cardiologist.     Discussion:  Notes persistent ED despite tadalafil.  Reports that he has used NG in the past and prescribed PRN.  He has not had to use it in recent years, however states he will not take this while taking tadalafil after extensive conversation of the risks today and at previous visits.    #Nocturia  #MARY ~ CPAP non-compliant  We discussed the natural history of nocturia and how is less often due to prostate enlargement and can be more related to medical issues such as sleep apnea, nocturnal polyuria, or modifiable risk factors such as fluid or caffeine intake. I have advised the following:  Advised avoiding caffeine after noon  Advised  restricting fluids altogether after 5-6 pm or at least 4 hours prior to bedtime.  Reevaluation with sleep medicine for use of CPAP consistently.    All questions were answered to the patient’s satisfaction.  Patient agrees with the plan and wishes to proceed.  Continue follow-up for ongoing care of his chronic medical conditions.       History of Present Illness (HPI):  Silvio presents for an annual follow up visit.  The patient’s EMR has been reviewed.  Lives in [place] and is   Initially referred by his Endocrinologist.   Given history of going urinary issues.     History of BPH with nocturia, and ED.   Started tadalafil 5mg daily for treatment of his BPH and ED.  C/o having a somewhat weak stream, frequency, and splitting.   Had benefit with tadalafil, but with persistent split stream.  Denies any side-effects and would like to continue.   This has been chronic and is urinary symptoms are not bothersome.   States he snores badly, has a CPAP but doesn't use it    TODAY: (05/28/25)  Presents for annual follow up.  Reports he has been doing well overall.   Continues to benefit with taking daily Cialis 5 mg.   Reports his urinary symptoms have remained stable, not bothersome.   Continues to have nocturia; NTF 2-3x.     The Cialis has helped his ED somewhat.   However, still experiencing unreliable erections.   Expressed interest in alternative options for his ED.   Denies any side-effects and would like to continue.     TO REVIEW: [05/29/24]  Regarding his urinary symptoms:   Reports a good response to tadalafil.  Stream remains OK, no significant change since last visit.  Denies any side-effects, no back pain or GERD.  He continues to have a split urine stream,   however this is chronic and not bothersome.   Denies any gross hematuria or recent UTIs.    From a sexual health standpoint:  Denies any improvement of his ED, despite tadalafil.  Gets good erections <50% of the time.   However, he wishes to  continue the medication;  as it has improved his urinary symptoms.  Has used NG in the past and is prescribed PRN;  but he has not had to use it over the last couple years.     *Per Patient: states that he has since followed up with his Cardiologist.   - He claims he was told that he may add any ED medication to his regiment.   - States he expressed low concern of side-effects if Viagra PRN added.     TO REVIEW: (05/31/23)  Regarding his urinary symptoms:   Reports a good response to tadalafil.  Stream remains OK, no significant change since last visit.  He continues to have a split urine stream,   however this is chronic and not bothersome.   Denies any gross hematuria or recent UTIs.    From a sexual health standpoint:  Denies any large difference regarding his ED, despite tadalafil.  Gets good erections <50% of the time.   However, he wishes to continue the medication;  as it has improved his urinary symptoms.  Has used NG in the past and is prescribed PRN;  but he has not had to use it over the last couple years.     TO REVIEW:  From a urinary standpoint.  Reports an OK stream, not as strong as it used to be  Goes frequently, few times an hour  3x nightly -> 0-1x now on tadalafil  Has some urgency with this but isn't rushing  The higher his blood sugar, the more frequent the urination  Waking up less at night since starting tadalafil, very happy with this     He is a diabetic, has + glucose on UA today but no infection  Sugars have been better around 130s now  Denies hematuria, UTIs, or stones  States he takes enough medications already     States he snores badly, has a CPAP but doesn't use it  Drinks decaf coffee in the AM only    From a sexual health standpoint.  Reports ED, becoming more of an issue  NONA 16, moderate confidence he can get the erection  Keeping the erection usually not a problem  Also reports PE his entire life  Erections are much improved with the tadalafil  Having AM erections now  even    Past Medical History:   Diagnosis Date    Atherosclerotic heart disease of native coronary artery without angina pectoris 2019    Coronary artery disease without angina pectoris, unspecified vessel or lesion type, unspecified whether native or transplanted heart    Diverticulitis, colon 10/31/2023    HLD (hyperlipidemia)     Ischemic cardiomyopathy     MI (myocardial infarction) (Multi)      Past Surgical History:   Procedure Laterality Date    CARDIAC ELECTROPHYSIOLOGY PROCEDURE Left 2024    Procedure: ICD DC Generator Change;  Surgeon: Stanislaw Sanchez MD;  Location: [facility];  Service: Electrophysiology;  Laterality: Left;  [employer] ICD generator replacement    OTHER SURGICAL HISTORY  2015    Cardioverter-defibrillator Pulse Generator Insertion    OTHER SURGICAL HISTORY  2021    Colonoscopy    OTHER SURGICAL HISTORY  2021    Percutaneous transluminal coronary angioplasty    OTHER SURGICAL HISTORY  2021    Cardiac catheterization    TONSILLECTOMY  2015    Tonsillectomy     Family History   Problem Relation Name Age of Onset    Other (malignant neoplasm of breast) Mother      Other (cardiac disorder) Father      Other (ischemic heart disease) Other       Social History     Tobacco Use   Smoking Status Former    Current packs/day: 0.00    Types: Cigarettes    Quit date: 2023    Years since quittin.8   Smokeless Tobacco Never     Current Outpatient Medications   Medication Sig Dispense Refill    albuterol 2.5 mg /3 mL (0.083 %) nebulizer solution Take 3 mL (2.5 mg) by nebulization 4 times a day as needed for wheezing or shortness of breath. 3 mL 1    albuterol 90 mcg/actuation inhaler Inhale 4 puffs every 4 hours if needed for wheezing. 18 g 3    aspirin 81 mg EC tablet Take 1 tablet (81 mg) by mouth once daily.      atenolol (Tenormin) 50 mg tablet Take 0.5 tablets (25 mg) by mouth 2 times a day. 90 tablet 1    baclofen (Lioresal) 10 mg  tablet Take 1 tablet (10 mg) by mouth 2 times a day. 60 tablet 5    bexagliflozin (Brenzavvy) 20 mg tablet Take 20 mg by mouth once daily. 90 tablet 3    clopidogrel (Plavix) 75 mg tablet Take 1 tablet (75 mg) by mouth once daily. 90 tablet 3    cyanocobalamin, vitamin B-12, (VITAMIN B-12 ORAL) Take by mouth.      fenofibrate (Tricor) 145 mg tablet Take 1 tablet (145 mg) by mouth once daily. Take with food. 90 tablet 3    FLUoxetine (PROzac) 20 mg tablet Take 1 tablet (20 mg) by mouth once daily. 90 tablet 3    fluticasone (Flonase) 50 mcg/actuation nasal spray Administer 1 spray into each nostril once daily. 16 g 3    FreeStyle Lite Strips strip USE TWICE DAILY      glipiZIDE (Glucotrol) 5 mg tablet Take 1 tablet (5 mg) by mouth 2 times a day before meals. 1 week after stopping trulicity. Skip if not eating 180 tablet 3    insulin glargine-yfgn 100 unit/mL (3 mL) pen Inject 28 Units under the skin once daily at bedtime. 24 mL 2    lancets (Accu-Chek Softclix Lancets) misc Use to test blood sugar twice daily 200 each 1    levothyroxine (Synthroid, Levoxyl) 175 mcg tablet Take 1 tablet (175 mcg) by mouth once daily. 90 tablet 2    losartan (Cozaar) 50 mg tablet Take 1 tablet (50 mg) by mouth 2 times a day. 180 tablet 3    magnesium oxide (Mag-Ox) 400 mg (241.3 mg magnesium) tablet Take 1 tablet by mouth once daily.      meloxicam (Mobic) 15 mg tablet Take 1 tablet (15 mg) by mouth once daily. (Meloxicam 15 MG Oral Tablet);  TAKE 1 TABLET DAILY as NEEDED 90 tablet 1    metFORMIN XR (Glucophage-XR) 500 mg 24 hr tablet Take 2 tablets (1,000 mg) by mouth 2 times a day. With food 360 tablet 3    nebulizer and compressor device Use as directed 1 each 0    nitroglycerin (Nitrostat) 0.4 mg SL tablet Place 1 tablet (0.4 mg) under the tongue every 5 minutes if needed for chest pain. 25 tablet 5    omeprazole (PriLOSEC) 40 mg DR capsule Take 1 capsule (40 mg) by mouth once daily. 90 capsule 2    pen needle, diabetic 32 gauge x  "5/32\" needle 1 Needle once daily. 100 each 1    pioglitazone (Actos) 45 mg tablet Take 1 tablet (45 mg) by mouth once daily. 90 tablet 2    rosuvastatin (Crestor) 40 mg tablet Take 1 tablet (40 mg) by mouth once daily. 90 tablet 3    semaglutide (Ozempic) 0.25 mg or 0.5 mg(2 mg/1.5 mL) pen injector 0.25 mg once weekly for 4 weeks if no side effects then increase 0.5 mg once weekly 4 mL 1    ZINC ORAL Take 100 mg by mouth. (Zinc 100 MG Oral Tablet)      sildenafil (Viagra) 50 mg tablet Take 1 tablet (50 mg) by mouth once daily as needed for erectile dysfunction. 10 tablet 11    tadalafil (Cialis) 5 mg tablet Take 1 tablet (5 mg) by mouth once daily. 90 tablet 0    varenicline tartrate (Chantix) 0.5 mg tablet Take 1 tablet (0.5 mg) by mouth once daily for 3 days, THEN 1 tablet (0.5 mg) 2 times a day for 4 days. 11 tablet 0    varenicline tartrate (Chantix) 1 mg tablet Take 1 tablet (1 mg) by mouth 2 times a day. (Patient not taking: Reported on 4/16/2025) 60 tablet 5     No current facility-administered medications for this visit.     Allergies   Allergen Reactions    Codeine Unknown     Blood pressure goes down    Hydrocodone-Acetaminophen Unknown     Blood pressure goes up    Lisinopril Cough     Past medical, surgical, family and social history in the chart was reviewed and is accurate including any additions to what is in this HPI.    REVIEW OF SYSTEMS (ROS):   Constitutional: denies any unintentional weight loss or change in strength.  Integumentary: denies any rashes or pruritus.  Eyes: denies any double vision or eye pain.  Ear/Nose/Mouth/Throat: denies any nosebleeds or gum bleeds.  Cardiovascular: denies any chest pain or syncope.  Respiratory: denies hemoptysis.  Gastrointestinal: denies nausea or vomiting.  Musculoskeletal: denies muscle cramping or weakness.  Neurologic: denies convulsions or seizures.  Hematologic/Lymphatic: denies bleeding tendencies.  Endocrine: denies heat/cold intolerance.  All other " systems have been reviewed and are negative unless otherwise noted in the HPI.     OBJECTIVE:  Visit Vitals  /84   Pulse 73       PHYSICAL EXAM:  Constitutional: No obvious distress.  Eyes: Non-injected conjunctiva, sclera clear, EOMI.  Ears/Nose/Mouth/Throat: No obvious drainage per ears or nose.  Cardiovascular: Extremities are warm and well perfused. No edema, cyanosis or pallor.  Respiratory: No audible wheezing/stridor; respirations do not appear labored.  Gastrointestinal: Abdomen soft, not distended.  Musculoskeletal: Normal ROM of extremities.  Skin: No obvious rashes or open sores.  Neurologic: Alert and oriented, CN 2-12 grossly intact.  Psychiatric: Answers questions appropriately with normal affect.  Hematologic/Lymphatic/Immunologic: No obvious bruises or sites of spontaneous bleeding.  Genitourinary: No CVA tenderness, bladder not palpable.     LABS & IMAGING:  Lab Results   Component Value Date    WBC 7.4 08/29/2024    HGB 14.2 08/29/2024    HCT 42.8 08/29/2024     08/29/2024    CHOL 134 04/16/2025    TRIG 333 (H) 04/16/2025    HDL 35 (L) 04/16/2025    LDLDIRECT 44 01/08/2020    ALT 24 04/16/2025    AST 34 04/16/2025     (L) 04/16/2025    K 4.8 04/16/2025     04/16/2025    CREATININE 1.52 (H) 04/16/2025    BUN 21 04/16/2025    CO2 21 04/16/2025    TSH 11.87 (H) 04/16/2025    PSA 2.30 07/15/2020    HGBA1C 8.2 (A) 04/16/2025     Scribed for Dr. Conor Yin by Vikas Talbert.  I, Dr. Cnoor Yin have personally reviewed and agreed with the information entered by the Virtual Scribe. 05/28/25.

## 2025-06-03 ENCOUNTER — TELEPHONE (OUTPATIENT)
Dept: ENDOCRINOLOGY | Facility: CLINIC | Age: 70
End: 2025-06-03
Payer: MEDICARE

## 2025-06-03 DIAGNOSIS — E11.59 TYPE 2 DIABETES MELLITUS WITH OTHER CIRCULATORY COMPLICATION, WITHOUT LONG-TERM CURRENT USE OF INSULIN: ICD-10-CM

## 2025-07-16 ENCOUNTER — TELEPHONE (OUTPATIENT)
Dept: ENDOCRINOLOGY | Facility: CLINIC | Age: 70
End: 2025-07-16
Payer: MEDICARE

## 2025-07-16 DIAGNOSIS — E11.59 TYPE 2 DIABETES MELLITUS WITH OTHER CIRCULATORY COMPLICATION, WITHOUT LONG-TERM CURRENT USE OF INSULIN: ICD-10-CM

## 2025-07-16 RX ORDER — PEN NEEDLE, DIABETIC 30 GX3/16"
1 NEEDLE, DISPOSABLE MISCELLANEOUS DAILY
Qty: 100 EACH | Refills: 3 | Status: SHIPPED | OUTPATIENT
Start: 2025-07-16

## 2025-07-16 NOTE — TELEPHONE ENCOUNTER
"pen needle, diabetic 32 gauge x 5/32\" needle one daily #100/3rf   [facility] [place] on [facility].  "

## 2025-07-31 ENCOUNTER — HOSPITAL ENCOUNTER (OUTPATIENT)
Dept: CARDIOLOGY | Facility: HOSPITAL | Age: 70
Discharge: HOME | End: 2025-07-31
Payer: MEDICARE

## 2025-07-31 DIAGNOSIS — I47.20 PAROXYSMAL VENTRICULAR TACHYCARDIA: ICD-10-CM

## 2025-07-31 DIAGNOSIS — Z95.810 PRESENCE OF AUTOMATIC CARDIOVERTER/DEFIBRILLATOR (AICD): ICD-10-CM

## 2025-07-31 PROCEDURE — 93295 DEV INTERROG REMOTE 1/2/MLT: CPT | Performed by: INTERNAL MEDICINE

## 2025-07-31 PROCEDURE — 93296 REM INTERROG EVL PM/IDS: CPT

## 2025-08-08 DIAGNOSIS — Z95.810 ICD (IMPLANTABLE CARDIOVERTER-DEFIBRILLATOR) IN PLACE: ICD-10-CM

## 2025-08-08 DIAGNOSIS — K21.9 GASTROESOPHAGEAL REFLUX DISEASE WITHOUT ESOPHAGITIS: ICD-10-CM

## 2025-08-08 DIAGNOSIS — I10 HTN (HYPERTENSION), BENIGN: ICD-10-CM

## 2025-08-08 RX ORDER — ATENOLOL 25 MG/1
25 TABLET ORAL 2 TIMES DAILY
Qty: 180 TABLET | Refills: 3 | Status: SHIPPED | OUTPATIENT
Start: 2025-08-08 | End: 2026-08-08

## 2025-08-08 RX ORDER — OMEPRAZOLE 40 MG/1
40 CAPSULE, DELAYED RELEASE ORAL
Qty: 90 CAPSULE | Refills: 3 | Status: SHIPPED | OUTPATIENT
Start: 2025-08-08 | End: 2026-08-08

## 2025-08-12 ENCOUNTER — PATIENT MESSAGE (OUTPATIENT)
Dept: ENDOCRINOLOGY | Facility: CLINIC | Age: 70
End: 2025-08-12
Payer: MEDICARE

## 2025-08-12 DIAGNOSIS — E11.59 TYPE 2 DIABETES MELLITUS WITH OTHER CIRCULATORY COMPLICATION, WITHOUT LONG-TERM CURRENT USE OF INSULIN: Primary | ICD-10-CM

## 2025-08-15 RX ORDER — CLOPIDOGREL BISULFATE 75 MG/1
75 TABLET ORAL DAILY
Qty: 90 TABLET | Refills: 3 | Status: SHIPPED | OUTPATIENT
Start: 2025-08-15

## 2025-08-19 DIAGNOSIS — E83.52 HYPERCALCEMIA: ICD-10-CM

## 2025-08-19 DIAGNOSIS — E11.59 TYPE 2 DIABETES MELLITUS WITH OTHER CIRCULATORY COMPLICATION, WITHOUT LONG-TERM CURRENT USE OF INSULIN: ICD-10-CM

## 2025-08-19 DIAGNOSIS — E03.9 HYPOTHYROIDISM, ADULT: ICD-10-CM

## 2025-09-03 ENCOUNTER — APPOINTMENT (OUTPATIENT)
Dept: ENDOCRINOLOGY | Facility: CLINIC | Age: 70
End: 2025-09-03
Payer: MEDICARE

## 2025-09-03 LAB
ALBUMIN SERPL-MCNC: 4.4 G/DL (ref 3.6–5.1)
ALBUMIN/CREAT UR: 199 MG/G CREAT
ALP SERPL-CCNC: 34 U/L (ref 35–144)
ALT SERPL-CCNC: 20 U/L (ref 9–46)
ANION GAP SERPL CALCULATED.4IONS-SCNC: 8 MMOL/L (CALC) (ref 7–17)
AST SERPL-CCNC: 23 U/L (ref 10–35)
BILIRUB SERPL-MCNC: 0.4 MG/DL (ref 0.2–1.2)
BUN SERPL-MCNC: 20 MG/DL (ref 7–25)
CA-I SERPL-MCNC: 5.6 MG/DL (ref 4.7–5.5)
CALCIUM SERPL-MCNC: 10 MG/DL (ref 8.6–10.3)
CHLORIDE SERPL-SCNC: 103 MMOL/L (ref 98–110)
CO2 SERPL-SCNC: 24 MMOL/L (ref 20–32)
CREAT SERPL-MCNC: 1.4 MG/DL (ref 0.7–1.35)
CREAT UR-MCNC: 127 MG/DL (ref 20–320)
EGFRCR SERPLBLD CKD-EPI 2021: 55 ML/MIN/1.73M2
EST. AVERAGE GLUCOSE BLD GHB EST-MCNC: 166 MG/DL
EST. AVERAGE GLUCOSE BLD GHB EST-SCNC: 9.2 MMOL/L
GLUCOSE SERPL-MCNC: 142 MG/DL (ref 65–99)
HBA1C MFR BLD: 7.4 %
MICROALBUMIN UR-MCNC: 25.3 MG/DL
POTASSIUM SERPL-SCNC: 4.4 MMOL/L (ref 3.5–5.3)
PROT SERPL-MCNC: 6.7 G/DL (ref 6.1–8.1)
PTH-INTACT SERPL-MCNC: 36 PG/ML (ref 16–77)
SODIUM SERPL-SCNC: 135 MMOL/L (ref 135–146)
T4 FREE SERPL-MCNC: 1.3 NG/DL (ref 0.8–1.8)
TSH SERPL-ACNC: 2.08 MIU/L (ref 0.4–4.5)

## 2025-09-03 ASSESSMENT — ENCOUNTER SYMPTOMS
LIGHT-HEADEDNESS: 0
NERVOUS/ANXIOUS: 0
SLEEP DISTURBANCE: 0
DIARRHEA: 0
PHOTOPHOBIA: 0
VOICE CHANGE: 0
ABDOMINAL PAIN: 0
ABDOMINAL DISTENTION: 0
SHORTNESS OF BREATH: 0
VOMITING: 0
NAUSEA: 0
TROUBLE SWALLOWING: 0
DYSURIA: 0
FREQUENCY: 0
UNEXPECTED WEIGHT CHANGE: 1
CONSTIPATION: 0
SORE THROAT: 0
ARTHRALGIAS: 1
CHEST TIGHTNESS: 0
AGITATION: 0
EYE ITCHING: 0
TREMORS: 0
HEADACHES: 0
PALPITATIONS: 0

## 2025-12-01 ENCOUNTER — APPOINTMENT (OUTPATIENT)
Dept: CARDIOLOGY | Facility: CLINIC | Age: 70
End: 2025-12-01
Payer: MEDICARE

## 2026-01-07 ENCOUNTER — APPOINTMENT (OUTPATIENT)
Dept: ENDOCRINOLOGY | Facility: CLINIC | Age: 71
End: 2026-01-07
Payer: MEDICARE

## 2026-05-06 ENCOUNTER — APPOINTMENT (OUTPATIENT)
Dept: ENDOCRINOLOGY | Facility: CLINIC | Age: 71
End: 2026-05-06
Payer: MEDICARE

## 2026-05-13 ENCOUNTER — APPOINTMENT (OUTPATIENT)
Dept: UROLOGY | Facility: CLINIC | Age: 71
End: 2026-05-13
Payer: MEDICARE

## 2026-09-09 ENCOUNTER — APPOINTMENT (OUTPATIENT)
Dept: ENDOCRINOLOGY | Facility: CLINIC | Age: 71
End: 2026-09-09
Payer: MEDICARE

## (undated) DEVICE — COVER, EQUIPMENT, DOME COVER, SNAP CAP, 30 IN, CLEAR, LF

## (undated) DEVICE — ELECTRODE, QUICK-COMBO, REDI PACK

## (undated) DEVICE — CABLE, SURGICAL, DISP

## (undated) DEVICE — DRESSING, AQUACEL AG, HYDROFIBER W/SILVER, 3.5 X 3.75 IN

## (undated) DEVICE — Device

## (undated) DEVICE — SUTURE, V-LOC, 4-0, 12 IN, P-12, 90 ABS

## (undated) DEVICE — INTRODUCER SYSTEM, PRELUDE SNAP, SPLITTABLE, 9 FR X 13 CM

## (undated) DEVICE — SUTURE, VICRYL, 3-0, 27 IN, CT-1, UNDYED

## (undated) DEVICE — SUTURE, ETHIBOND, XTRA, 30 IN, 0, CT-1, GREEN

## (undated) DEVICE — PHOTONBLADE